# Patient Record
Sex: MALE | Race: WHITE | NOT HISPANIC OR LATINO | Employment: OTHER | URBAN - METROPOLITAN AREA
[De-identification: names, ages, dates, MRNs, and addresses within clinical notes are randomized per-mention and may not be internally consistent; named-entity substitution may affect disease eponyms.]

---

## 2017-05-08 ENCOUNTER — GENERIC CONVERSION - ENCOUNTER (OUTPATIENT)
Dept: OTHER | Facility: OTHER | Age: 67
End: 2017-05-08

## 2017-05-12 LAB
A/G RATIO (HISTORICAL): 1.7 (ref 1.2–2.2)
ALBUMIN SERPL BCP-MCNC: 4.3 G/DL (ref 3.6–4.8)
ALP SERPL-CCNC: 41 IU/L (ref 39–117)
ALT SERPL W P-5'-P-CCNC: 21 IU/L (ref 0–44)
AST SERPL W P-5'-P-CCNC: 23 IU/L (ref 0–40)
BILIRUB SERPL-MCNC: 0.7 MG/DL (ref 0–1.2)
BUN SERPL-MCNC: 20 MG/DL (ref 8–27)
BUN/CREA RATIO (HISTORICAL): 24 (ref 10–24)
CALCIUM SERPL-MCNC: 8.8 MG/DL (ref 8.6–10.2)
CHLORIDE SERPL-SCNC: 101 MMOL/L (ref 96–106)
CHOLEST SERPL-MCNC: 112 MG/DL (ref 100–199)
CHOLEST/HDLC SERPL: 2.9 RATIO UNITS (ref 0–5)
CO2 SERPL-SCNC: 21 MMOL/L (ref 18–29)
CREAT SERPL-MCNC: 0.82 MG/DL (ref 0.76–1.27)
EGFR AFRICAN AMERICAN (HISTORICAL): 108 ML/MIN/1.73
EGFR-AMERICAN CALC (HISTORICAL): 93 ML/MIN/1.73
GLUCOSE SERPL-MCNC: 94 MG/DL (ref 65–99)
HDLC SERPL-MCNC: 39 MG/DL
LDLC SERPL CALC-MCNC: 56 MG/DL (ref 0–99)
POTASSIUM SERPL-SCNC: 4.2 MMOL/L (ref 3.5–5.2)
SODIUM SERPL-SCNC: 139 MMOL/L (ref 134–144)
TOT. GLOBULIN, SERUM (HISTORICAL): 2.5 G/DL (ref 1.5–4.5)
TOTAL PROTEIN (HISTORICAL): 6.8 G/DL (ref 6–8.5)
TRIGL SERPL-MCNC: 85 MG/DL (ref 0–149)
VLDLC SERPL CALC-MCNC: 17 MG/DL (ref 5–40)

## 2017-05-19 ENCOUNTER — ALLSCRIPTS OFFICE VISIT (OUTPATIENT)
Dept: OTHER | Facility: OTHER | Age: 67
End: 2017-05-19

## 2017-10-13 ENCOUNTER — GENERIC CONVERSION - ENCOUNTER (OUTPATIENT)
Dept: OTHER | Facility: OTHER | Age: 67
End: 2017-10-13

## 2017-11-20 ENCOUNTER — ALLSCRIPTS OFFICE VISIT (OUTPATIENT)
Dept: OTHER | Facility: OTHER | Age: 67
End: 2017-11-20

## 2017-11-21 NOTE — PROGRESS NOTES
Assessment    1  Benign essential hypertension (401 1) (I10)   2  Hyperlipidemia (272 4) (E78 5)   3  Enlarged prostate without lower urinary tract symptoms (luts) (600 00) (N40 0)   4  Ischemic heart disease, chronic (414 9) (I25 9)   5  Class 1 obesity with serious comorbidity and body mass index (BMI) of 32 0 to 32 9 in adult, unspecified obesity type (278 00,V85 32) (D36 8,M55 70)    Plan  Benign essential hypertension    · A diet low in sodium and high in potassium, magnesium, and calcium can help yourblood pressure ; Status:Complete;   Done: 88NAC3115 08:57AM   · Begin or continue regular aerobic exercise  Gradually work up to at least 3 sessions of30 minutes of exercise a week ; Status:Complete;   Done: 03WSE0686 08:57AM   · Eat a low fat and low cholesterol diet ; Status:Complete;   Done: 08OZN6853 08:57AM   · Keep a diary of when and what you eat ; Status:Complete;   Done: 41VCC0736 08:57AM   · Restrict the salt in your diet by avoiding highly salted foods ; Status:Complete;   Done:20Nov2017 08:57AM   · Some eating tips that can help you lose weight ; Status:Complete;   Done: 32DMU467304:40UW   · We recommend that you change your eating habits slowly ; Status:Complete;   Done:20Nov2017 08:57AM   · We want to put you on the DASH diet for {count1} calories ; Status:Complete;   Done:20Nov2017 08:57AM  Enlarged prostate without lower urinary tract symptoms (luts)    · Tamsulosin HCl - 0 4 MG Oral Capsule; take 1 capsule by mouth at bedtime  Need for Tdap vaccination    · Adacel 5-2-15 5 LF-MCG/0 5 Intramuscular Suspension    Discussion/Summary    All stable, continue medsUrologist consult for nowto the office in 6 months  The treatment plan was reviewed with the patient/guardian  The patient/guardian understands and agrees with the treatment plan      Chief Complaint  Patient is here today for follow up of chronic conditions described in HPI        History of Present Illness  61years old male seen for follow-up BPH, hypertension, coronary artery disease, hyperlipidemia, all stable, regular follow-up with cardiologist, recent blood work showed LDL at goal, HDL 37, taking meds as prescribed, did not see urologist in the past, symptoms of BPH are controlled unless patient is drinking coffee right before going to bed      Review of Systems   Constitutional: No fever or chills, feels well, no tiredness, no recent weight gain or weight loss  ENT: no complaints of earache, no hearing loss, no nosebleeds, no nasal discharge, no sore throat, no hoarseness  Cardiovascular: No complaints of slow heart rate, no fast heart rate, no chest pain, no palpitations, no leg claudication, no lower extremity  Respiratory: No complaints of shortness of breath, no wheezing, no cough, no SOB on exertion, no orthopnea or PND  Gastrointestinal: No complaints of abdominal pain, no constipation, no nausea or vomiting, no diarrhea or bloody stools  Musculoskeletal: No complaints of arthralgia, no myalgias, no joint swelling or stiffness, no limb pain or swelling  Integumentary: No complaints of skin rash or skin lesions, no itching, no skin wound, no dry skin  Neurological: No compliants of headache, no confusion, no convulsions, no numbness or tingling, no dizziness or fainting, no limb weakness, no difficulty walking  Psychiatric: Is not suicidal, no sleep disturbances, no anxiety or depression, no change in personality, no emotional problems  Endocrine: No complaints of proptosis, no hot flashes, no muscle weakness, no erectile dysfunction, no deepening of the voice, no feelings of weakness  Active Problems  1  Abnormal blood chemistry (790 6) (R79 9)   2  Actinic keratosis (702 0) (L57 0)   3  Benign essential hypertension (401 1) (I10)   4  CAD in native artery (414 01) (I25 10)   5  Carpal tunnel syndrome, unspecified laterality (354 0) (G56 00)   6   Encounter for screening for malignant neoplasm of colon (V76 51) (Z12 11)   7  Encounter for screening for malignant neoplasm of colon (V76 51) (Z12 11)   8  Enlarged prostate without lower urinary tract symptoms (luts) (600 00) (N40 0)   9  Flu vaccine need (V04 81) (Z23)   10  Hyperlipidemia (272 4) (E78 5)   11  Ischemic heart disease, chronic (414 9) (I25 9)   12  Need for influenza vaccination (V04 81) (Z23)   13  Pre-procedural examination (V72 84) (Z01 818)   14  Status post angioplasty (V45 89) (Z98 62)   15  Well adult on routine health check (V70 0) (Z00 00)    Surgical History  1  History of Appendectomy   2  History of PTCA    The surgical history was reviewed and updated today  Family History  Mother    1  No pertinent family history  Father    2  Family history of    3  Family history of Lung abnormality    The family history was reviewed and updated today  Social History     · Denied: History of Being A Social Drinker   · Daily Coffee Consumption (3  Cups/Day)   · Marital History - Currently    · Never A Smoker   · Retired From Work   · Sleep Pattern Time In Bed (8  Hours)  The social history was reviewed and updated today  Current Meds   1  Aspirin Low Dose 81 MG TABS; TAKE 1 TABLET DAILY; Therapy: (Recorded:63Wrm4198) to Recorded   2  Atorvastatin Calcium 40 MG Oral Tablet; take 1 tablet daily at bedtime; Therapy: 74MWQ8765 to (Evaluate:2018)  Requested for: 2017; Last Rx:92Epz5724 Ordered   3  Biotin 5000 MCG Oral Capsule; TAKE 1 CAPSULE DAILY; Therapy: (Recorded:21Duy7618) to Recorded   4  Centrum Silver TABS; TAKE 1 TABLET DAILY; Therapy: (Recorded:24Gnk8177) to Recorded   5  Co Q-10 400 MG Oral Capsule; TAKE 1 CAPSULE DAILY WITH A MEAL; Therapy: 24FUF4904 to Recorded   6  Ezetimibe 10 MG Oral Tablet; take 1 tablet by mouth every morning; Therapy: 82DMT0147 to (Evaluate:49Llk9801)  Requested for: 80Fei7609; Last Rx:90Fcp6452 Ordered   7  Krill Oil CAPS; take 1 capsule daily;  Therapy: (Recorded:80Kmz7189) to Recorded 8  Metoprolol Succinate ER 25 MG Oral Tablet Extended Release 24 Hour; TAKE 1 TABLET ONCE DAILY; Therapy: 96FAZ5112 to (Eual Slate)  Requested for: 25NPN8616; Last Rx:72Nvv0057 Ordered   9  Osteo-Bi-Flex TABS; 2 qd; Therapy: 61QRN2218 to  Requested for: 83IIX1759 Recorded   10  Restasis 0 05 % Ophthalmic Emulsion; Therapy: 22RNI4403 to Recorded   11  Tamsulosin HCl - 0 4 MG Oral Capsule; take 1 capsule by mouth at bedtime; Therapy: 35Mxo4858 to (Evaluate:81Wso8183)  Requested for: 01FZR5428; Last  Rx:91Lks8366; Status: ACTIVE - Renewal Denied Ordered   12  Valsartan 160 MG Oral Tablet; take 1 tablet by mouth once daily; Therapy: 94NEZ8859 to (Austyn Grossman)  Requested for: 29FEY2436; Last  Rx:27Isi6749 Ordered   13  Vitamin D3 2000 UNIT Oral Tablet; Take 1 tablet daily; Therapy: (Recorded:37Yot6092) to Recorded    The medication list was reviewed and updated today  Allergies  1  No Known Drug Allergies    Vitals  Vital Signs    Recorded: 20Nov2017 08:19AM   Temperature 97 8 F   Heart Rate 84   Respiration Quality Normal   Respiration 16   Systolic 954   Diastolic 70   BP CUFF SIZE Large   Height 5 ft 7 5 in   Weight 209 lb    BMI Calculated 32 25   BSA Calculated 2 07       Physical Exam   Constitutional  General appearance: No acute distress, well appearing and well nourished  obese  Pulmonary  Respiratory effort: No increased work of breathing or signs of respiratory distress  Auscultation of lungs: Clear to auscultation, equal breath sounds bilaterally, no wheezes, no rales, no rhonci  Cardiovascular  Auscultation of heart: Normal rate and rhythm, normal S1 and S2, without murmurs  Examination of extremities for edema and/or varicosities: Normal    Musculoskeletal  Gait and station: Normal    Neurologic  Cranial nerves: Cranial nerves 2-12 intact     Psychiatric  Mood and affect: Normal          Results/Data  PHQ-2 Adult Depression Screening 20Nov2017 08:24AM User, Ahs     Test Name Result Flag Reference   PHQ-2 Adult Depression Score 0       Over the last two weeks, how often have you been bothered by any of the following problems? Little interest or pleasure in doing things: Not at all - 0 Feeling down, depressed, or hopeless: Not at all - 0   PHQ-2 Adult Depression Screening Negative       PHQ-2 Adult Depression Screening 20Nov2017 08:24AM Cody Priest     Test Name Result Flag Reference   PHQ-2 Adult Depression Score 0       Over the last two weeks, how often have you been bothered by any of the following problems? Little interest or pleasure in doing things: Not at all - 0 Feeling down, depressed, or hopeless: Not at all - 0   PHQ-2 Adult Depression Screening Negative       (1) COMPREHENSIVE METABOLIC PANEL 68JCA0123 60:42EX Kathy Caal     Test Name Result Flag Reference   Glucose, Serum 94 mg/dL  65-99   BUN 20 mg/dL  8-27   Creatinine, Serum 0 82 mg/dL  0 76-1 27   BUN/Creatinine Ratio 24  10-24   Sodium, Serum 139 mmol/L  134-144   Potassium, Serum 4 2 mmol/L  3 5-5 2   Chloride, Serum 101 mmol/L     Carbon Dioxide, Total 21 mmol/L  18-29   Calcium, Serum 8 8 mg/dL  8 6-10 2   Protein, Total, Serum 6 8 g/dL  6 0-8 5   Albumin, Serum 4 3 g/dL  3 6-4 8   Globulin, Total 2 5 g/dL  1 5-4 5   A/G Ratio 1 7  1 2-2 2   Bilirubin, Total 0 7 mg/dL  0 0-1 2   Alkaline Phosphatase, S 41 IU/L     AST (SGOT) 23 IU/L  0-40   ALT (SGPT) 21 IU/L  0-44   eGFR If NonAfricn Am 93 mL/min/1 73  >59   eGFR If Africn Am 108 mL/min/1 73  >59     (1) LIPID PANEL, FASTING 03HYF8097 08:21AM Kathy Caal     Test Name Result Flag Reference   Cholesterol, Total 112 mg/dL  100-199   Triglycerides 85 mg/dL  0-149   HDL Cholesterol 39 mg/dL L >39   VLDL Cholesterol Richardson 17 mg/dL  5-40   LDL Cholesterol Calc 56 mg/dL  0-99   T  Chol/HDL Ratio 2 9 ratio units  0 0-5 0     T   Chol/HDL Ratio                                                            Men  Women                                              1/2 Avg Risk  3 4    3 3                                                  Avg Risk  5 0    4 4                                               2X Avg  Risk  9 6    7 1                                               3X Avg  Risk 23 4   11 0       Signatures   Electronically signed by : JAMIE Bonilla ; Nov 20 2017  9:00AM EST                       (Author)

## 2018-01-12 VITALS
HEIGHT: 68 IN | RESPIRATION RATE: 16 BRPM | BODY MASS INDEX: 31.67 KG/M2 | TEMPERATURE: 97.8 F | DIASTOLIC BLOOD PRESSURE: 70 MMHG | SYSTOLIC BLOOD PRESSURE: 120 MMHG | WEIGHT: 209 LBS | HEART RATE: 84 BPM

## 2018-01-12 VITALS
OXYGEN SATURATION: 98 % | BODY MASS INDEX: 33.59 KG/M2 | SYSTOLIC BLOOD PRESSURE: 120 MMHG | DIASTOLIC BLOOD PRESSURE: 76 MMHG | HEART RATE: 61 BPM | HEIGHT: 66 IN | WEIGHT: 209 LBS

## 2018-01-22 VITALS
DIASTOLIC BLOOD PRESSURE: 76 MMHG | WEIGHT: 209.4 LBS | SYSTOLIC BLOOD PRESSURE: 130 MMHG | BODY MASS INDEX: 31.74 KG/M2 | OXYGEN SATURATION: 96 % | HEART RATE: 76 BPM | HEIGHT: 68 IN

## 2018-03-16 ENCOUNTER — OFFICE VISIT (OUTPATIENT)
Dept: FAMILY MEDICINE CLINIC | Facility: CLINIC | Age: 68
End: 2018-03-16
Payer: MEDICARE

## 2018-03-16 VITALS
DIASTOLIC BLOOD PRESSURE: 80 MMHG | WEIGHT: 214 LBS | HEART RATE: 60 BPM | HEIGHT: 66 IN | RESPIRATION RATE: 12 BRPM | BODY MASS INDEX: 34.39 KG/M2 | TEMPERATURE: 98.3 F | SYSTOLIC BLOOD PRESSURE: 134 MMHG

## 2018-03-16 DIAGNOSIS — R20.2 PARESTHESIA OF LEFT THUMB: ICD-10-CM

## 2018-03-16 DIAGNOSIS — M25.532 LEFT WRIST PAIN: Primary | ICD-10-CM

## 2018-03-16 DIAGNOSIS — M79.641 RIGHT HAND PAIN: ICD-10-CM

## 2018-03-16 PROCEDURE — 99213 OFFICE O/P EST LOW 20 MIN: CPT | Performed by: NURSE PRACTITIONER

## 2018-03-16 RX ORDER — TAMSULOSIN HYDROCHLORIDE 0.4 MG/1
CAPSULE ORAL
COMMUNITY
Start: 2018-02-20 | End: 2018-09-15 | Stop reason: SDUPTHER

## 2018-03-16 RX ORDER — ATORVASTATIN CALCIUM 40 MG/1
TABLET, FILM COATED ORAL
COMMUNITY
Start: 2018-01-09 | End: 2018-08-30 | Stop reason: SDUPTHER

## 2018-03-16 RX ORDER — METOPROLOL SUCCINATE 25 MG/1
25 TABLET, EXTENDED RELEASE ORAL DAILY
COMMUNITY
Start: 2018-02-20 | End: 2018-09-15 | Stop reason: SDUPTHER

## 2018-03-16 RX ORDER — VALSARTAN 160 MG/1
1 TABLET ORAL DAILY
COMMUNITY
Start: 2011-10-13 | End: 2018-07-23 | Stop reason: ALTCHOICE

## 2018-03-16 RX ORDER — BIOTIN 5 MG
1 TABLET ORAL DAILY
COMMUNITY
End: 2019-09-13

## 2018-03-16 RX ORDER — EZETIMIBE 10 MG/1
1 TABLET ORAL
COMMUNITY
Start: 2015-03-06 | End: 2018-06-23 | Stop reason: SDUPTHER

## 2018-03-16 NOTE — PROGRESS NOTES
Assessment/Plan:   1  Left wrist pain    - Ambulatory referral to Orthopedic Surgery; Future    2  Paresthesia of left thumb    - Ambulatory referral to Orthopedic Surgery; Future    3  Right hand pain    - Ambulatory referral to Orthopedic Surgery; Future    Suspect carpal tunnel  Ref to ortho  Will need emg/ncs     Subjective:  I have reviewed past medical history, surgical history, medications, allergies, family history, and social history  Patient ID: Karen Crawford is a 74 y o  male  Left wrist pain with occ shooting pain at times  Left thumb numb at times  Occ numbness to left index finger  Right hand occ with "electrical" shooting pains  Musician and plays horn  Had EMG/NCS probably 10 years ago, left worse than right  Last few days, getting worse and now left thumb constantly numb  Review of Systems   Musculoskeletal: Negative for joint swelling  Pain left wrist     Numbness left thumb  Intermittent shooting pains right hand  Skin: Negative for color change, pallor and rash  Neurological: Positive for numbness  Objective:     Physical Exam   Constitutional: He is oriented to person, place, and time  He appears well-developed and well-nourished  No distress  Cardiovascular: Normal rate and regular rhythm  Normal palpable radial pulses b/l  Brisk cap refill b/l hands   Pulmonary/Chest: Effort normal and breath sounds normal    Musculoskeletal: Normal range of motion  He exhibits no edema  Neurological: He is alert and oriented to person, place, and time  Skin: Skin is warm and dry  No rash noted  Psychiatric: He has a normal mood and affect

## 2018-03-30 VITALS
HEIGHT: 65 IN | BODY MASS INDEX: 35.89 KG/M2 | SYSTOLIC BLOOD PRESSURE: 133 MMHG | DIASTOLIC BLOOD PRESSURE: 80 MMHG | HEART RATE: 80 BPM | WEIGHT: 215.4 LBS

## 2018-03-30 DIAGNOSIS — M65.341 TRIGGER FINGER, RIGHT RING FINGER: ICD-10-CM

## 2018-03-30 DIAGNOSIS — G56.02 CARPAL TUNNEL SYNDROME ON LEFT: Primary | ICD-10-CM

## 2018-03-30 PROCEDURE — 99204 OFFICE O/P NEW MOD 45 MIN: CPT | Performed by: ORTHOPAEDIC SURGERY

## 2018-03-30 RX ORDER — QUINIDINE SULFATE 200 MG
1 TABLET ORAL DAILY
COMMUNITY
Start: 2017-10-13

## 2018-03-30 RX ORDER — ACETAMINOPHEN 160 MG
2000 TABLET,DISINTEGRATING ORAL DAILY
COMMUNITY

## 2018-03-30 RX ORDER — CYCLOSPORINE 0.5 MG/ML
EMULSION OPHTHALMIC EVERY 12 HOURS
COMMUNITY
Start: 2013-01-11

## 2018-03-30 NOTE — PROGRESS NOTES
Assessment/Plan:  Assessment/Plan   1  Carpal tunnel syndrome on left     2  Trigger finger, right ring finger       Scribe Attestation    I,:   Debra Rios am acting as a scribe while in the presence of the attending physician :        I,:   Az Ferreira MD personally performed the services described in this documentation    as scribed in my presence :            Plan  Upon physical examination, I feel that Nirmal Fontana is a candidate for left wrist carpal tunnel release  Nirmal Fontana does report a prior EMG study 5-6 years ago which confirmed the diagnosis of left carpal tunnel syndrome  I did discuss with Nirmal Fontana the risks and benefits of the procedure, including but not limited to infection, bleeding, nerve injury, pain, and the procedure not working as well as intended, and  He has elected proceed with this procedure and we'll assist him in scheduling in the office today  I will follow-up with him after the procedure per protocol  Additionally, I feel that Nirmal Fontana has symptoms related to early stages of right ring trigger finger  However, considering this has only been symptomatic for one week, I am deferring an injection or further treatment at this time  I will follow-up with Nirmal Fontana for the trigger finger on an as-needed basis as symptoms dictate  Nirmal Fontana understands and is in agreement with this treatment plan  Subjective:   Patient ID: Editha Cogan is a 74 y o  male  Nirmal Fontana is a 22-year-old right-hand-dominant male here today for initial evaluation of his left wrist and right finger  She is referred today by his brother Rimma Garibay, who is a prior patient  Today, he reports a long-term left wrist carpal tunnel syndrome symptoms for the past 5-6 years  He reports he had a prior EMG study which confirmed this diagnosis  Today, he reports continued moderate, intermittent pain and numbness when he plays the [place] or at night when sleeping and wakes him up    He denies any radiating pain for prior left wrist injury/trauma  He is hopeful to pursue left wrist carpal tunnel release as he feels this will help him return without symptoms to playing the [place]  Additionally, he reports right ring finger pain that has been ongoing for the past week or so with activities requiring him to repeatedly flex his right ring finger  He reports this pain as moderate and intermittent in nature, but denies any numbness, tingling, or radiating pain  The following portions of the patient's history were reviewed and updated as appropriate:   He  has a past medical history of Coronary artery disease; Hyperlipidemia; and Hypertension  He There are no active problems to display for this patient  He  has a past surgical history that includes Coronary angioplasty with stent  His family history is not on file  He  reports that he has never smoked  He has never used smokeless tobacco  He reports that he does not drink alcohol or use drugs  Current Outpatient Prescriptions   Medication Sig Dispense Refill    aspirin (ASPIRIN LOW DOSE) 81 MG tablet Take 1 tablet by mouth daily      atorvastatin (LIPITOR) 40 mg tablet       Cholecalciferol (VITAMIN D3) 2000 units capsule Take 1 tablet by mouth daily      Coenzyme Q10 (CO Q-10) 400 MG CAPS Take 1 capsule by mouth Daily      cycloSPORINE (RESTASIS) 0 05 % ophthalmic emulsion Apply to eye      ezetimibe (ZETIA) 10 mg tablet Take 1 tablet by mouth      Krill Oil 1000 MG CAPS Take 1 capsule by mouth daily      metoprolol succinate (TOPROL-XL) 25 mg 24 hr tablet       Misc Natural Products (OSTEO BI-FLEX/5-LOXIN ADVANCED) TABS Take by mouth daily      Multiple Vitamins-Minerals (BIO-35 GLUTEN-FREE PO) Take 1 capsule by mouth daily      tamsulosin (FLOMAX) 0 4 mg       valsartan (DIOVAN) 160 mg tablet Take 1 tablet by mouth daily       No current facility-administered medications for this visit        Current Outpatient Prescriptions on File Prior to Visit   Medication Sig    aspirin (ASPIRIN LOW DOSE) 81 MG tablet Take 1 tablet by mouth daily    atorvastatin (LIPITOR) 40 mg tablet     ezetimibe (ZETIA) 10 mg tablet Take 1 tablet by mouth    Krill Oil 1000 MG CAPS Take 1 capsule by mouth daily    metoprolol succinate (TOPROL-XL) 25 mg 24 hr tablet     Multiple Vitamins-Minerals (BIO-35 GLUTEN-FREE PO) Take 1 capsule by mouth daily    tamsulosin (FLOMAX) 0 4 mg     valsartan (DIOVAN) 160 mg tablet Take 1 tablet by mouth daily     No current facility-administered medications on file prior to visit  He has No Known Allergies       Review of Systems   Constitutional: Negative for chills, fever and unexpected weight change  HENT: Negative for hearing loss, nosebleeds and sore throat  Eyes: Negative for pain, redness and visual disturbance  Respiratory: Negative for cough, shortness of breath and wheezing  Cardiovascular: Negative for chest pain, palpitations and leg swelling  Gastrointestinal: Negative for abdominal pain, nausea and vomiting  Endocrine: Negative for polydipsia and polyuria  Genitourinary: Negative for dysuria and hematuria  Musculoskeletal: Positive for myalgias  Negative for arthralgias and joint swelling  Skin: Negative for rash and wound  Neurological: Positive for numbness  Negative for dizziness and headaches  Psychiatric/Behavioral: Negative for decreased concentration and suicidal ideas  The patient is not nervous/anxious  (-) Depression       Objective:  Right Hand Exam     Tenderness   The patient is experiencing tenderness in the lopez area  Range of Motion   The patient has normal right wrist ROM  Hand   MP Ring: normal   PIP Ring: normal   DIP Ring: normal     Muscle Strength   The patient has normal right wrist strength    Wrist Extension: 5/5   Wrist Flexion: 5/5   : 5/5     Tests   Tinels Sign (Medial Nerve): negative    Other   Sensation: normal  Pulse: present      Left Hand Exam     Tenderness   The patient is experiencing tenderness in the lopez area  Range of Motion   The patient has normal left wrist ROM  Muscle Strength   Left wrist normal muscle strength: Abductor Pollicis Brevis- 5/5  Wrist Extension: 5/5   Wrist Flexion: 5/5   :  4/5     Tests   Phalens Sign: positive  Tinels Sign (Medial Nerve): positive  Finkelstein: positive    Other   Sensation: normal  Pulse: present    Comments:  Marbin's- (+)          Strength/Myotome Testing     Left Wrist/Hand   Wrist extension: 5  Wrist flexion: 5    Right Wrist/Hand   Normal wrist strength  Wrist extension: 5  Wrist flexion: 5    Tests     Left Wrist/Hand   Positive Finkelstein's, Phalen's sign and Tinel's sign (medial nerve)  Right Wrist/Hand   Negative Tinel's sign (medial nerve)  Physical Exam   Constitutional: He is oriented to person, place, and time  He appears well-developed and well-nourished  HENT:   Head: Normocephalic  Nose: Nose normal    Eyes: Conjunctivae and EOM are normal    Neck: Normal range of motion  Cardiovascular: Normal rate, regular rhythm, normal heart sounds and intact distal pulses  Pulmonary/Chest: Effort normal and breath sounds normal  No respiratory distress  Abdominal: Soft  He exhibits no distension  Musculoskeletal: Normal range of motion  Left wrist: He exhibits tenderness  He exhibits normal range of motion, no bony tenderness, no swelling and no crepitus  Right hand: He exhibits tenderness  He exhibits normal range of motion, normal capillary refill and no swelling  Normal sensation noted  Normal strength noted  Hands:  Neurological: He is alert and oriented to person, place, and time  Skin: Skin is warm and dry  Psychiatric: He has a normal mood and affect   His behavior is normal  Judgment and thought content normal

## 2018-06-22 ENCOUNTER — OFFICE VISIT (OUTPATIENT)
Dept: FAMILY MEDICINE CLINIC | Facility: CLINIC | Age: 68
End: 2018-06-22
Payer: MEDICARE

## 2018-06-22 VITALS
DIASTOLIC BLOOD PRESSURE: 80 MMHG | TEMPERATURE: 97.8 F | SYSTOLIC BLOOD PRESSURE: 140 MMHG | HEART RATE: 72 BPM | WEIGHT: 214 LBS | BODY MASS INDEX: 35.65 KG/M2 | HEIGHT: 65 IN

## 2018-06-22 DIAGNOSIS — J06.9 UPPER RESPIRATORY TRACT INFECTION, UNSPECIFIED TYPE: Primary | ICD-10-CM

## 2018-06-22 PROBLEM — E66.9 CLASS 1 OBESITY WITH SERIOUS COMORBIDITY AND BODY MASS INDEX (BMI) OF 32.0 TO 32.9 IN ADULT: Status: ACTIVE | Noted: 2017-11-20

## 2018-06-22 PROBLEM — E66.811 CLASS 1 OBESITY WITH SERIOUS COMORBIDITY AND BODY MASS INDEX (BMI) OF 32.0 TO 32.9 IN ADULT: Status: ACTIVE | Noted: 2017-11-20

## 2018-06-22 PROCEDURE — 99213 OFFICE O/P EST LOW 20 MIN: CPT | Performed by: FAMILY MEDICINE

## 2018-06-22 RX ORDER — AZITHROMYCIN 250 MG/1
TABLET, FILM COATED ORAL
Qty: 6 TABLET | Refills: 0 | Status: SHIPPED | OUTPATIENT
Start: 2018-06-22 | End: 2018-06-26

## 2018-06-22 RX ORDER — BENZONATATE 200 MG/1
200 CAPSULE ORAL 3 TIMES DAILY PRN
Qty: 20 CAPSULE | Refills: 0 | Status: SHIPPED | OUTPATIENT
Start: 2018-06-22 | End: 2019-06-05

## 2018-06-22 NOTE — PROGRESS NOTES
Ronna Bradford is a 74 y o  male who presents for evaluation of symptoms of a URI, possible sinusitis  Symptoms include achiness, congestion, coryza, non productive cough, post nasal drip and sinus pressure  Onset of symptoms was 1 week ago and has been gradually worsening since that time  Treatment to date: cough suppressants and decongestants  The following portions of the patient's history were reviewed and updated as appropriate: allergies, current medications, past family history, past medical history, past social history, past surgical history and problem list     Review of Systems  Constitutional: negative  Respiratory: positive for cough, negative for asthma, chronic bronchitis and wheezing  Gastrointestinal: negative  Objective     General appearance: alert and oriented, in no acute distress  Ears: normal TM's and external ear canals both ears  Nose: sinus tenderness bilateral  Lungs: clear to auscultation bilaterally  Assessment/Plan     URI/possible early sinusitis   Discussed diagnosis and treatment of URI  Suggested symptomatic OTC remedies  Nasal saline spray for congestion  Zithromax per orders  Follow up as needed  Dorathy Infield

## 2018-06-23 DIAGNOSIS — E78.5 DYSLIPIDEMIA: Primary | ICD-10-CM

## 2018-06-25 RX ORDER — EZETIMIBE 10 MG/1
TABLET ORAL
Qty: 90 TABLET | Refills: 2 | Status: SHIPPED | OUTPATIENT
Start: 2018-06-25 | End: 2019-03-31 | Stop reason: SDUPTHER

## 2018-06-26 PROBLEM — G56.02 CARPAL TUNNEL SYNDROME ON LEFT: Status: ACTIVE | Noted: 2018-06-26

## 2018-07-02 ENCOUNTER — TELEPHONE (OUTPATIENT)
Dept: OBGYN CLINIC | Facility: CLINIC | Age: 68
End: 2018-07-02

## 2018-07-02 DIAGNOSIS — E78.5 HYPERLIPIDEMIA, UNSPECIFIED HYPERLIPIDEMIA TYPE: Primary | ICD-10-CM

## 2018-07-02 NOTE — TELEPHONE ENCOUNTER
Wife calling concerning Laura Guerra  Has surgery schedule with Dr Toro Diaz on 08/13/18  Has some questions concerning bloodwork script he is picking up  Wanted to make sure cholesterol is included  States he needs clearance from Dr Yaquelin Zaragoza and he will want cholesterol checked  Please call       Phone# [phone]

## 2018-07-03 ENCOUNTER — TELEPHONE (OUTPATIENT)
Dept: OBGYN CLINIC | Facility: CLINIC | Age: 68
End: 2018-07-03

## 2018-07-17 NOTE — TELEPHONE ENCOUNTER
I spoke with   Chicho Enrique  She said that Agueda Sethi stopped in and picked up all lab orders and had them drawn at [facility] this week

## 2018-07-18 LAB
AMBIG ABBREV DEFAULT: NORMAL
AMBIG ABBREV DEFAULT: NORMAL
APTT PPP: 25 SEC (ref 24–33)
BASOPHILS # BLD AUTO: 0 X10E3/UL (ref 0–0.2)
BASOPHILS NFR BLD AUTO: 0 %
BUN SERPL-MCNC: 15 MG/DL (ref 8–27)
BUN/CREAT SERPL: 17 (ref 10–24)
CALCIUM SERPL-MCNC: 9.2 MG/DL (ref 8.6–10.2)
CHLORIDE SERPL-SCNC: 101 MMOL/L (ref 96–106)
CHOLEST SERPL-MCNC: 128 MG/DL (ref 100–199)
CO2 SERPL-SCNC: 24 MMOL/L (ref 20–29)
CREAT SERPL-MCNC: 0.88 MG/DL (ref 0.76–1.27)
EOSINOPHIL # BLD AUTO: 0.1 X10E3/UL (ref 0–0.4)
EOSINOPHIL NFR BLD AUTO: 2 %
ERYTHROCYTE [DISTWIDTH] IN BLOOD BY AUTOMATED COUNT: 13.4 % (ref 12.3–15.4)
GLUCOSE SERPL-MCNC: 97 MG/DL (ref 65–99)
HCT VFR BLD AUTO: 43.5 % (ref 37.5–51)
HDLC SERPL-MCNC: 41 MG/DL
HGB BLD-MCNC: 14.6 G/DL (ref 13–17.7)
IMM GRANULOCYTES # BLD: 0 X10E3/UL (ref 0–0.1)
IMM GRANULOCYTES NFR BLD: 0 %
INR PPP: 1 (ref 0.8–1.2)
LDLC SERPL CALC-MCNC: 57 MG/DL (ref 0–99)
LYMPHOCYTES # BLD AUTO: 2.8 X10E3/UL (ref 0.7–3.1)
LYMPHOCYTES NFR BLD AUTO: 39 %
MCH RBC QN AUTO: 33 PG (ref 26.6–33)
MCHC RBC AUTO-ENTMCNC: 33.6 G/DL (ref 31.5–35.7)
MCV RBC AUTO: 98 FL (ref 79–97)
MONOCYTES # BLD AUTO: 0.5 X10E3/UL (ref 0.1–0.9)
MONOCYTES NFR BLD AUTO: 7 %
NEUTROPHILS # BLD AUTO: 3.7 X10E3/UL (ref 1.4–7)
NEUTROPHILS NFR BLD AUTO: 52 %
PLATELET # BLD AUTO: 186 X10E3/UL (ref 150–379)
POTASSIUM SERPL-SCNC: 4.4 MMOL/L (ref 3.5–5.2)
PROTHROMBIN TIME: 10.4 SEC (ref 9.1–12)
RBC # BLD AUTO: 4.42 X10E6/UL (ref 4.14–5.8)
SL AMB EGFR AFRICAN AMERICAN: 104 ML/MIN/1.73
SL AMB EGFR NON AFRICAN AMERICAN: 90 ML/MIN/1.73
SL AMB VLDL CHOLESTEROL CALC: 30 MG/DL (ref 5–40)
SODIUM SERPL-SCNC: 139 MMOL/L (ref 134–144)
TRIGL SERPL-MCNC: 150 MG/DL (ref 0–149)
WBC # BLD AUTO: 7.2 X10E3/UL (ref 3.4–10.8)

## 2018-07-23 ENCOUNTER — OFFICE VISIT (OUTPATIENT)
Dept: CARDIOLOGY CLINIC | Facility: CLINIC | Age: 68
End: 2018-07-23
Payer: MEDICARE

## 2018-07-23 VITALS
BODY MASS INDEX: 35.71 KG/M2 | SYSTOLIC BLOOD PRESSURE: 100 MMHG | WEIGHT: 214.6 LBS | DIASTOLIC BLOOD PRESSURE: 60 MMHG | OXYGEN SATURATION: 91 % | HEART RATE: 76 BPM

## 2018-07-23 DIAGNOSIS — G56.02 CARPAL TUNNEL SYNDROME ON LEFT: ICD-10-CM

## 2018-07-23 DIAGNOSIS — I10 ESSENTIAL HYPERTENSION: ICD-10-CM

## 2018-07-23 DIAGNOSIS — I25.10 CAD IN NATIVE ARTERY: ICD-10-CM

## 2018-07-23 DIAGNOSIS — E66.9 OBESITY (BMI 35.0-39.9 WITHOUT COMORBIDITY): ICD-10-CM

## 2018-07-23 DIAGNOSIS — I25.9 ISCHEMIC HEART DISEASE, CHRONIC: ICD-10-CM

## 2018-07-23 DIAGNOSIS — Z01.818 PREOP EXAMINATION: Primary | ICD-10-CM

## 2018-07-23 DIAGNOSIS — E78.2 MIXED HYPERLIPIDEMIA: ICD-10-CM

## 2018-07-23 PROCEDURE — 93000 ELECTROCARDIOGRAM COMPLETE: CPT | Performed by: INTERNAL MEDICINE

## 2018-07-23 PROCEDURE — 99205 OFFICE O/P NEW HI 60 MIN: CPT | Performed by: INTERNAL MEDICINE

## 2018-07-23 RX ORDER — OLMESARTAN MEDOXOMIL 20 MG/1
20 TABLET ORAL DAILY
Qty: 90 TABLET | Refills: 2 | Status: SHIPPED | OUTPATIENT
Start: 2018-07-23 | End: 2019-01-05 | Stop reason: SDUPTHER

## 2018-07-23 RX ORDER — GLUCOSAMINE/D3/BOSWELLIA SERRA 1500MG-400
5000 TABLET ORAL DAILY
COMMUNITY

## 2018-07-23 NOTE — LETTER
July 24, 2018     Maxine Rea MD  [facility] [address]    Patient: Edin Dominguez   YOB: 1950   Date of Visit: 7/23/2018       Dear Dr Arabella Hugo: Thank you for referring Edin Dominguez to me for evaluation  Below are my notes for this consultation  If you have questions, please do not hesitate to call me  I look forward to following your patient along with you  Sincerely,        Sujatha Smith DO        CC: No Recipients  Kathy Caal DO  7/24/2018 10:33 PM  Sign at close encounter   Subjective:      Edin Dominguez is a 74 y o  male who presents to the office today for a preoperative consultation at the request of surgeon Dr Arabella Hugo who plans on performing Carpal tunnel release on August 13  This consultation is requested for the specific conditions prompting preoperative evaluation (i e  because of potential affect on operative risk): CAD  Planned anesthesia is general  The patient has no known anesthesia issues  He denies any shortness of breath or chest pain while going up 2 flights of stairs  He has a history of RCA stent in 2014  Prior to his PCI he had a burning sensation in his chest  He previously was on atorvastatin 80 mg daily which was reduced to 40 mg daily due to myalgias and elevated creatinine kinase  Reports good endurance to medications and remains on Zetia  The following portions of the patient's history were reviewed and updated as appropriate:   He  has a past medical history of Abnormal electrocardiogram; Chest pain; Coronary artery disease; Enthesopathy of elbow (02/15/2006); Epididymo-orchitis (07/10/2012); Hyperlipidemia; Hypertension; and Voice disturbance (05/27/2008)  He  has a past surgical history that includes Coronary angioplasty with stent; Appendectomy; and Coronary angioplasty  His family history is not on file  He  reports that he has never smoked   He has never used smokeless tobacco  He reports that he does not drink alcohol or use drugs  Current Outpatient Prescriptions   Medication Sig Dispense Refill    aspirin (ASPIRIN LOW DOSE) 81 MG tablet Take 1 tablet by mouth daily      atorvastatin (LIPITOR) 40 mg tablet       Biotin 28034 MCG TABS Take 5,000 mg by mouth daily      Cholecalciferol (VITAMIN D3) 2000 units capsule Take 1 tablet by mouth daily      Coenzyme Q10 (CO Q-10) 400 MG CAPS Take 1 capsule by mouth Daily      cycloSPORINE (RESTASIS) 0 05 % ophthalmic emulsion Apply to eye      ezetimibe (ZETIA) 10 mg tablet TAKE 1 TABLET EVERY MORNING 90 tablet 2    Krill Oil 1000 MG CAPS Take 1 capsule by mouth daily      metoprolol succinate (TOPROL-XL) 25 mg 24 hr tablet       Misc Natural Products (OSTEO BI-FLEX/5-LOXIN ADVANCED) TABS Take by mouth daily      Multiple Vitamins-Minerals (BIO-35 GLUTEN-FREE PO) Take 1 capsule by mouth daily      tamsulosin (FLOMAX) 0 4 mg       benzonatate (TESSALON) 200 MG capsule Take 1 capsule (200 mg total) by mouth 3 (three) times a day as needed for cough 20 capsule 0    olmesartan (BENICAR) 20 mg tablet Take 1 tablet (20 mg total) by mouth daily 90 tablet 2     No current facility-administered medications for this visit  He has No Known Allergies       Review of Systems    Review of Systems   Constitutional: Negative for chills, fatigue and fever  HENT: Negative for congestion, nosebleeds and postnasal drip  Respiratory: Negative for cough, chest tightness and shortness of breath  Cardiovascular: Negative for chest pain, palpitations and leg swelling  Gastrointestinal: Negative for abdominal distention, abdominal pain, diarrhea, nausea and vomiting  Endocrine: Negative for polydipsia, polyphagia and polyuria  Musculoskeletal: Negative for gait problem and myalgias  Skin: Negative for color change, pallor and rash     Allergic/Immunologic: Negative for environmental allergies, food allergies and immunocompromised state  Neurological: Positive for numbness  Negative for dizziness, seizures, syncope and light-headedness  Hematological: Negative for adenopathy  Does not bruise/bleed easily  Psychiatric/Behavioral: Negative for dysphoric mood  The patient is not nervous/anxious  Objective:      Physical Exam  /60 (BP Location: Right arm, Patient Position: Sitting, Cuff Size: Large)   Pulse 76   Wt 97 3 kg (214 lb 9 6 oz)   SpO2 91%   BMI 35 71 kg/m²    Physical Exam   Constitutional: He appears healthy  No distress  HENT:   Nose: Nose normal    Mouth/Throat: Oropharynx is clear  Neck: Neck supple  No JVD present  Cardiovascular: Normal rate and regular rhythm  Exam reveals no distant heart sounds and no friction rub  No murmur heard  Pulmonary/Chest: Effort normal and breath sounds normal  He has no wheezes  He has no rales  He exhibits no tenderness  Abdominal: Soft  He exhibits no distension  There is no tenderness  Musculoskeletal: He exhibits no edema  Neurological: He is alert and oriented to person, place, and time  Skin: Skin is warm and dry  No rash noted  Cardiographics  ECG: NSR with LVH   Echocardiogram: normal and reviewed by myself    Lab Review   Lab Results   Component Value Date     05/12/2017    K 4 2 05/12/2017     05/12/2017    CO2 21 05/12/2017    BUN 15 07/13/2018    CREATININE 0 88 07/13/2018    CREATININE 0 82 05/12/2017    GLUCOSE 94 05/12/2017    CALCIUM 8 8 05/12/2017     Lab Results   Component Value Date    HGB 14 6 07/13/2018    HCT 43 5 07/13/2018    MCV 98 (H) 07/13/2018     07/13/2018     Lab Results   Component Value Date    CHOL 112 05/12/2017    TRIG 150 (H) 07/13/2018    HDL 41 07/13/2018          Assessment:      74 y o  male with planned surgery as above  Known risk factors for perioperative complications: Coronary disease    Difficulty with intubation is not anticipated      Cardiac Risk Estimation: per the Revised Cardiac Risk Index (Circ  100:1043, 1999), the patient's risk factors for cardiac complications include history of ischemic heart disease, putting him in: RCI RISK CLASS II (1 risk factor, risk of major cardiac compl  appr  1 3%)     1  Preop examination    2  Carpal tunnel syndrome on left    3  Essential hypertension    4  CAD in native artery    5  Ischemic heart disease, chronic    6  Mixed hyperlipidemia    7  Obesity (BMI 35 0-39 9 without comorbidity)       Plan:      1  Preoperative workup as follows none  2  Change in medication regimen before surgery: none, continue medication regimen including morning of surgery, with sip of water  3  Prophylaxis for cardiac events with perioperative beta-blockers: continue metoprolol  4  Invasive hemodynamic monitoring perioperatively: not indicated  5  Deep vein thrombosis prophylaxis postoperatively:regimen to be chosen by surgical team     Follow up in 6 months  Lipid profile in interim

## 2018-07-23 NOTE — PROGRESS NOTES
Subjective:      Bridgette Lund is a 74 y o  male who presents to the office today for a preoperative consultation at the request of surgeon Dr Jesusita Núñez who plans on performing Carpal tunnel release on August 13  This consultation is requested for the specific conditions prompting preoperative evaluation (i e  because of potential affect on operative risk): CAD  Planned anesthesia is general  The patient has no known anesthesia issues  He denies any shortness of breath or chest pain while going up 2 flights of stairs  He has a history of RCA stent in 2014  Prior to his PCI he had a burning sensation in his chest  He previously was on atorvastatin 80 mg daily which was reduced to 40 mg daily due to myalgias and elevated creatinine kinase  Reports good endurance to medications and remains on Zetia  The following portions of the patient's history were reviewed and updated as appropriate:   He  has a past medical history of Abnormal electrocardiogram; Chest pain; Coronary artery disease; Enthesopathy of elbow (02/15/2006); Epididymo-orchitis (07/10/2012); Hyperlipidemia; Hypertension; and Voice disturbance (05/27/2008)  He  has a past surgical history that includes Coronary angioplasty with stent; Appendectomy; and Coronary angioplasty  His family history is not on file  He  reports that he has never smoked  He has never used smokeless tobacco  He reports that he does not drink alcohol or use drugs    Current Outpatient Prescriptions   Medication Sig Dispense Refill    aspirin (ASPIRIN LOW DOSE) 81 MG tablet Take 1 tablet by mouth daily      atorvastatin (LIPITOR) 40 mg tablet       Biotin 83850 MCG TABS Take 5,000 mg by mouth daily      Cholecalciferol (VITAMIN D3) 2000 units capsule Take 1 tablet by mouth daily      Coenzyme Q10 (CO Q-10) 400 MG CAPS Take 1 capsule by mouth Daily      cycloSPORINE (RESTASIS) 0 05 % ophthalmic emulsion Apply to eye      ezetimibe (ZETIA) 10 mg tablet TAKE 1 TABLET EVERY MORNING 90 tablet 2    Krill Oil 1000 MG CAPS Take 1 capsule by mouth daily      metoprolol succinate (TOPROL-XL) 25 mg 24 hr tablet       Misc Natural Products (OSTEO BI-FLEX/5-LOXIN ADVANCED) TABS Take by mouth daily      Multiple Vitamins-Minerals (BIO-35 GLUTEN-FREE PO) Take 1 capsule by mouth daily      tamsulosin (FLOMAX) 0 4 mg       benzonatate (TESSALON) 200 MG capsule Take 1 capsule (200 mg total) by mouth 3 (three) times a day as needed for cough 20 capsule 0    olmesartan (BENICAR) 20 mg tablet Take 1 tablet (20 mg total) by mouth daily 90 tablet 2     No current facility-administered medications for this visit  He has No Known Allergies       Review of Systems    Review of Systems   Constitutional: Negative for chills, fatigue and fever  HENT: Negative for congestion, nosebleeds and postnasal drip  Respiratory: Negative for cough, chest tightness and shortness of breath  Cardiovascular: Negative for chest pain, palpitations and leg swelling  Gastrointestinal: Negative for abdominal distention, abdominal pain, diarrhea, nausea and vomiting  Endocrine: Negative for polydipsia, polyphagia and polyuria  Musculoskeletal: Negative for gait problem and myalgias  Skin: Negative for color change, pallor and rash  Allergic/Immunologic: Negative for environmental allergies, food allergies and immunocompromised state  Neurological: Positive for numbness  Negative for dizziness, seizures, syncope and light-headedness  Hematological: Negative for adenopathy  Does not bruise/bleed easily  Psychiatric/Behavioral: Negative for dysphoric mood  The patient is not nervous/anxious  Objective:      Physical Exam  /60 (BP Location: Right arm, Patient Position: Sitting, Cuff Size: Large)   Pulse 76   Wt 97 3 kg (214 lb 9 6 oz)   SpO2 91%   BMI 35 71 kg/m²   Physical Exam   Constitutional: He appears healthy  No distress     HENT:   Nose: Nose normal  Mouth/Throat: Oropharynx is clear  Neck: Neck supple  No JVD present  Cardiovascular: Normal rate and regular rhythm  Exam reveals no distant heart sounds and no friction rub  No murmur heard  Pulmonary/Chest: Effort normal and breath sounds normal  He has no wheezes  He has no rales  He exhibits no tenderness  Abdominal: Soft  He exhibits no distension  There is no tenderness  Musculoskeletal: He exhibits no edema  Neurological: He is alert and oriented to person, place, and time  Skin: Skin is warm and dry  No rash noted  Cardiographics  ECG: NSR with LVH   Echocardiogram: normal and reviewed by myself    Lab Review   Lab Results   Component Value Date     05/12/2017    K 4 2 05/12/2017     05/12/2017    CO2 21 05/12/2017    BUN 15 07/13/2018    CREATININE 0 88 07/13/2018    CREATININE 0 82 05/12/2017    GLUCOSE 94 05/12/2017    CALCIUM 8 8 05/12/2017     Lab Results   Component Value Date    HGB 14 6 07/13/2018    HCT 43 5 07/13/2018    MCV 98 (H) 07/13/2018     07/13/2018     Lab Results   Component Value Date    CHOL 112 05/12/2017    TRIG 150 (H) 07/13/2018    HDL 41 07/13/2018          Assessment:      74 y o  male with planned surgery as above  Known risk factors for perioperative complications: Coronary disease    Difficulty with intubation is not anticipated  Cardiac Risk Estimation: per the Revised Cardiac Risk Index (Circ  [id number], 1999), the patient's risk factors for cardiac complications include history of ischemic heart disease, putting him in: RCI RISK CLASS II (1 risk factor, risk of major cardiac compl  appr  1 3%)     1  Preop examination    2  Carpal tunnel syndrome on left    3  Essential hypertension    4  CAD in native artery    5  Ischemic heart disease, chronic    6  Mixed hyperlipidemia    7  Obesity (BMI 35 0-39 9 without comorbidity)       Plan:      1  Preoperative workup as follows none    2  Change in medication regimen before surgery: none, continue medication regimen including morning of surgery, with sip of water  3  Prophylaxis for cardiac events with perioperative beta-blockers: continue metoprolol  4  Invasive hemodynamic monitoring perioperatively: not indicated  5  Deep vein thrombosis prophylaxis postoperatively:regimen to be chosen by surgical team     Follow up in 6 months  Lipid profile in interim

## 2018-07-27 ENCOUNTER — OFFICE VISIT (OUTPATIENT)
Dept: OBGYN CLINIC | Facility: CLINIC | Age: 68
End: 2018-07-27
Payer: MEDICARE

## 2018-07-27 ENCOUNTER — TELEPHONE (OUTPATIENT)
Dept: CARDIOLOGY CLINIC | Facility: CLINIC | Age: 68
End: 2018-07-27

## 2018-07-27 VITALS
WEIGHT: 214.8 LBS | HEART RATE: 78 BPM | DIASTOLIC BLOOD PRESSURE: 65 MMHG | BODY MASS INDEX: 35.79 KG/M2 | HEIGHT: 65 IN | SYSTOLIC BLOOD PRESSURE: 118 MMHG

## 2018-07-27 DIAGNOSIS — G56.02 CARPAL TUNNEL SYNDROME ON LEFT: Primary | ICD-10-CM

## 2018-07-27 PROCEDURE — 99214 OFFICE O/P EST MOD 30 MIN: CPT | Performed by: ORTHOPAEDIC SURGERY

## 2018-07-27 NOTE — PROGRESS NOTES
Assessment/Plan:  1  Carpal tunnel syndrome on left         Scribe Attestation    I,:   Yany Epperson am acting as a scribe while in the presence of the attending physician :        I,:   Peter Ponce MD personally performed the services described in this documentation    as scribed in my presence :              Phuong Collins today and demonstrates signs and symptoms consistent with carpal tunnel syndrome of the left wrist  I did readdress surgical procedure of carpal tunnel release for him as we had prior discussion on 3/30/2018  The risks of procedure included but are not limited to bleeding, weakness, nerve injury, increased symptoms, loss of range of motion, recurrent injury, need for further surgery and infection  Phuong Zhaopatrick did receive recent clearance from his cardiologist for carpal tunnel release  The I did discuss with Phuong Collins that it is important for him to take his beta-blocker the day of surgery  Additionally I did discuss with Phuong Collins post operative care  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 he is going to have to wait approximately 2 weeks following surgery to allow the incision to heal, and it will be approximately 4 weeks before he can lift anything with any significant weight  Phuong Collins understood the risks to the procedure as well as postoperative care and would like to continue forward with scheduling a surgery date for carpal tunnel release  I did received signed consent today  His heart lungs sounds normal exam today  I will see Phuong Collins on the day of surgery  Subjective:   Crispin Morgan is a 74 y o  male who presents left wrist and hand numbness and discomfort  This has been ongoing issue for 5-6 years  He states that he has a prior EMG study from 05/06 years ago that demonstrated carpal tunnel syndrome of the left wrist  Today he is experiencing intermittent and mild numbness and discomfort about the thumb index finger and long finger of the left hand    He states that he experiences the nighttime numbness, and pain discomfort is exacerbated with activity such as driving the due to continued pressure placed on the median nerve  Additionally he will note numbness and discomfort while 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  The discomfort is alleviated at rest   Today denies any distal paresthesias and has normal sensation  Review of Systems   Constitutional: Negative  HENT: Negative  Eyes: Negative  Respiratory: Negative  Cardiovascular: Negative  Gastrointestinal: Negative  Endocrine: Negative  Genitourinary: Negative  Musculoskeletal: Positive for arthralgias  Skin: Negative  Allergic/Immunologic: Negative  Neurological: Negative  Hematological: Negative  Psychiatric/Behavioral: Negative  Past Medical History:   Diagnosis Date    Abnormal electrocardiogram     last assessed 12/19/13    Chest pain     Coronary artery disease     Enthesopathy of elbow 02/15/2006    Epididymo-orchitis 07/10/2012    Hyperlipidemia     Hypertension     Voice disturbance 05/27/2008       Past Surgical History:   Procedure Laterality Date    APPENDECTOMY      CORONARY ANGIOPLASTY      CORONARY ANGIOPLASTY WITH STENT PLACEMENT         History reviewed  No pertinent family history  Social History     Occupational History    Not on file       Social History Main Topics    Smoking status: Never Smoker    Smokeless tobacco: Never Used    Alcohol use No    Drug use: No    Sexual activity: Not on file         Current Outpatient Prescriptions:     aspirin (ASPIRIN LOW DOSE) 81 MG tablet, Take 1 tablet by mouth daily, Disp: , Rfl:     atorvastatin (LIPITOR) 40 mg tablet, , Disp: , Rfl:     benzonatate (TESSALON) 200 MG capsule, Take 1 capsule (200 mg total) by mouth 3 (three) times a day as needed for cough, Disp: 20 capsule, Rfl: 0    Biotin 82900 MCG TABS, Take 5,000 mg by mouth daily, Disp: , Rfl:     Cholecalciferol (VITAMIN D3) 2000 units capsule, Take 1 tablet by mouth daily, Disp: , Rfl:     Coenzyme Q10 (CO Q-10) 400 MG CAPS, Take 1 capsule by mouth Daily, Disp: , Rfl:     cycloSPORINE (RESTASIS) 0 05 % ophthalmic emulsion, Apply to eye, Disp: , Rfl:     ezetimibe (ZETIA) 10 mg tablet, TAKE 1 TABLET EVERY MORNING, Disp: 90 tablet, Rfl: 2    Krill Oil 1000 MG CAPS, Take 1 capsule by mouth daily, Disp: , Rfl:     metoprolol succinate (TOPROL-XL) 25 mg 24 hr tablet, , Disp: , Rfl:     Misc Natural Products (OSTEO BI-FLEX/5-LOXIN ADVANCED) TABS, Take by mouth daily, Disp: , Rfl:     Multiple Vitamins-Minerals (BIO-35 GLUTEN-FREE PO), Take 1 capsule by mouth daily, Disp: , Rfl:     olmesartan (BENICAR) 20 mg tablet, Take 1 tablet (20 mg total) by mouth daily, Disp: 90 tablet, Rfl: 2    tamsulosin (FLOMAX) 0 4 mg, , Disp: , Rfl:     No Known Allergies    Objective:  Vitals:    07/27/18 0757   BP: 118/65   Pulse: 78       Left Hand Exam     Tenderness   The patient is experiencing no tenderness  Range of Motion     Wrist   Extension: 50   Flexion: 80   Pronation: 90   Supination: 90     Hand   MP Thumb: 90   DIP Thumb: 90     Muscle Strength   Wrist Extension: 5/5   Wrist Flexion: 5/5   :  5/5     Tests   Tinels Sign (Medial Nerve): negative    Other   Erythema: absent  Scars: absent  Sensation: normal  Pulse: present    Comments:  Durken's: (+)  Phalens (+)  EBL:  5/5  APB:  5/5  FPB:  5/5              Strength/Myotome Testing     Left Wrist/Hand   Wrist extension: 5  Wrist flexion: 5    Tests     Left Wrist/Hand   Negative Tinel's sign (medial nerve)  Physical Exam   Constitutional: He is oriented to person, place, and time  He appears well-developed and well-nourished  HENT:   Head: Atraumatic  Eyes: Conjunctivae are normal    Neck: Normal range of motion  Cardiovascular: Normal rate and normal heart sounds      Pulmonary/Chest: Effort normal and breath sounds normal    Musculoskeletal:   As noted in HPI   Neurological: He is alert and oriented to person, place, and time  Skin: Skin is warm and dry  Psychiatric: He has a normal mood and affect  His behavior is normal  Judgment and thought content normal    Nursing note and vitals reviewed

## 2018-08-07 RX ORDER — COVID-19 ANTIGEN TEST
KIT MISCELLANEOUS
COMMUNITY
End: 2021-09-24

## 2018-08-07 NOTE — PRE-PROCEDURE INSTRUCTIONS
My Surgical Experience    The following information was developed to assist you to prepare for your operation  What do I need to do before coming to the hospital?   Arrange for a responsible person to drive you to and from the hospital    Arrange care for your children at home  Children are not allowed in the recovery areas of the hospital   Plan to wear clothing that is easy to put on and take off  If you are having shoulder surgery, wear a shirt that buttons or zippers in the front  Bathing  o Shower the evening before and the morning of your surgery with an antibacterial soap  Please refer to the Pre Op Showering Instructions for Surgery Patients Sheet   o Remove nail polish and all body piercing jewelry  o Do not shave any body part for at least 24 hours before surgery-this includes face, arms, legs and upper body  Food  o Nothing to eat or drink after midnight the night before your surgery  This includes candy and chewing gum  o Exception: If your surgery is after 12:00pm (noon), you may have clear liquids such as 7-Up®, ginger ale, apple or cranberry juice, Jell-O®, water, or clear broth until 8:00 am  o Do not drink milk or juice with pulp on the morning before surgery  o Do not drink alcohol 24 hours before surgery  Medicine  o Follow instructions you received from your surgeon about which medicines you may take on the day of surgery  o If instructed to take medicine on the morning of surgery, take pills with just a small sip of water  Call your prescribing doctor for specific infroamtion on what to do if you take insulin    What should I bring to the hospital?    Bring:  Rollo Bong or a walker, if you have them, for foot or knee surgery   A list of the daily medicines, vitamins, minerals, herbals and nutritional supplements you take   Include the dosages of medicines and the time you take them each day   Glasses, dentures or hearing aids   Minimal clothing; you will be wearing hospital sleepwear   Photo ID; required to verify your identity   If you have a Living Will or Power of , bring a copy of the documents   If you have an ostomy, bring an extra pouch and any supplies you use    Do not bring   Medicines or inhalers   Money, valuables or jewelry    What other information should I know about the day of surgery?  Notify your surgeons if you develop a cold, sore throat, cough, fever, rash or any other illness   Report to the Ambulatory Surgical/Same Day Surgery Unit   You will be instructed to stop at Registration only if you have not been pre-registered   Inform your  fi they do not stay that they will be asked by the staff to leave a phone number where they can be reached   Be available to be reached before surgery  In the event the operating room schedule changes, you may be asked to come in earlier or later than expected    *It is important to tell your doctor and others involved in your health care if you are taking or have been taking any non-prescription drugs, vitamins, minerals, herbals or other nutritional supplements  Any of these may interact with some food or medicines and cause a reaction      Pre-Surgery Instructions:   Medication Instructions    aspirin (ASPIRIN LOW DOSE) 81 MG tablet Instructed patient per Anesthesia Guidelines   atorvastatin (LIPITOR) 40 mg tablet Instructed patient per Anesthesia Guidelines   Biotin 02570 MCG TABS Instructed patient per Anesthesia Guidelines   Cholecalciferol (VITAMIN D3) 2000 units capsule Instructed patient per Anesthesia Guidelines   Coenzyme Q10 (CO Q-10) 400 MG CAPS Instructed patient per Anesthesia Guidelines   cycloSPORINE (RESTASIS) 0 05 % ophthalmic emulsion Instructed patient per Anesthesia Guidelines   ezetimibe (ZETIA) 10 mg tablet Instructed patient per Anesthesia Guidelines   Krill Oil 1000 MG CAPS Instructed patient per Anesthesia Guidelines      metoprolol succinate (TOPROL-XL) 25 mg 24 hr tablet Instructed patient per Anesthesia Guidelines   Misc Natural Products (OSTEO BI-FLEX/5-LOXIN ADVANCED) TABS Instructed patient per Anesthesia Guidelines   Multiple Vitamins-Minerals (BIO-35 GLUTEN-FREE PO) Instructed patient per Anesthesia Guidelines   Naproxen Sodium (ALEVE) 220 MG CAPS Instructed patient per Anesthesia Guidelines   olmesartan (BENICAR) 20 mg tablet Instructed patient per Anesthesia Guidelines   tamsulosin (FLOMAX) 0 4 mg Instructed patient per Anesthesia Guidelines  To take benicar, metoprolol and eye drops a m   Of surge surgery

## 2018-08-09 ENCOUNTER — CLINICAL SUPPORT (OUTPATIENT)
Dept: CARDIOLOGY CLINIC | Facility: CLINIC | Age: 68
End: 2018-08-09
Payer: MEDICARE

## 2018-08-09 VITALS
DIASTOLIC BLOOD PRESSURE: 60 MMHG | HEART RATE: 64 BPM | OXYGEN SATURATION: 93 % | SYSTOLIC BLOOD PRESSURE: 110 MMHG | BODY MASS INDEX: 35.76 KG/M2 | WEIGHT: 214.9 LBS

## 2018-08-09 DIAGNOSIS — I10 BENIGN ESSENTIAL HTN: Primary | ICD-10-CM

## 2018-08-09 PROCEDURE — 99211 OFF/OP EST MAY X REQ PHY/QHP: CPT

## 2018-08-09 RX ORDER — ACETAMINOPHEN 325 MG/1
650 TABLET ORAL EVERY 6 HOURS PRN
COMMUNITY
End: 2019-06-05

## 2018-08-13 ENCOUNTER — HOSPITAL ENCOUNTER (OUTPATIENT)
Facility: HOSPITAL | Age: 68
Setting detail: OUTPATIENT SURGERY
Discharge: HOME | End: 2018-08-13
Attending: ORTHOPAEDIC SURGERY | Admitting: ORTHOPAEDIC SURGERY
Payer: MEDICARE

## 2018-08-13 ENCOUNTER — ANESTHESIA EVENT (OUTPATIENT)
Dept: PERIOP | Facility: HOSPITAL | Age: 68
End: 2018-08-13
Payer: MEDICARE

## 2018-08-13 ENCOUNTER — ANESTHESIA (OUTPATIENT)
Dept: PERIOP | Facility: HOSPITAL | Age: 68
End: 2018-08-13
Payer: MEDICARE

## 2018-08-13 VITALS
SYSTOLIC BLOOD PRESSURE: 145 MMHG | DIASTOLIC BLOOD PRESSURE: 87 MMHG | TEMPERATURE: 97.6 F | RESPIRATION RATE: 18 BRPM | OXYGEN SATURATION: 95 % | HEART RATE: 65 BPM

## 2018-08-13 PROCEDURE — 64721 CARPAL TUNNEL SURGERY: CPT | Performed by: ORTHOPAEDIC SURGERY

## 2018-08-13 RX ORDER — PROPOFOL 10 MG/ML
INJECTION, EMULSION INTRAVENOUS CONTINUOUS PRN
Status: DISCONTINUED | OUTPATIENT
Start: 2018-08-13 | End: 2018-08-13 | Stop reason: SURG

## 2018-08-13 RX ORDER — FENTANYL CITRATE/PF 50 MCG/ML
25 SYRINGE (ML) INJECTION
Status: DISCONTINUED | OUTPATIENT
Start: 2018-08-13 | End: 2018-08-13 | Stop reason: HOSPADM

## 2018-08-13 RX ORDER — ONDANSETRON 2 MG/ML
4 INJECTION INTRAMUSCULAR; INTRAVENOUS ONCE AS NEEDED
Status: DISCONTINUED | OUTPATIENT
Start: 2018-08-13 | End: 2018-08-13 | Stop reason: HOSPADM

## 2018-08-13 RX ORDER — SODIUM CHLORIDE, SODIUM LACTATE, POTASSIUM CHLORIDE, CALCIUM CHLORIDE 600; 310; 30; 20 MG/100ML; MG/100ML; MG/100ML; MG/100ML
75 INJECTION, SOLUTION INTRAVENOUS CONTINUOUS
Status: DISCONTINUED | OUTPATIENT
Start: 2018-08-13 | End: 2018-08-13 | Stop reason: HOSPADM

## 2018-08-13 RX ORDER — MIDAZOLAM HYDROCHLORIDE 1 MG/ML
INJECTION INTRAMUSCULAR; INTRAVENOUS AS NEEDED
Status: DISCONTINUED | OUTPATIENT
Start: 2018-08-13 | End: 2018-08-13 | Stop reason: SURG

## 2018-08-13 RX ORDER — LIDOCAINE HYDROCHLORIDE 10 MG/ML
INJECTION, SOLUTION INFILTRATION; PERINEURAL AS NEEDED
Status: DISCONTINUED | OUTPATIENT
Start: 2018-08-13 | End: 2018-08-13 | Stop reason: SURG

## 2018-08-13 RX ORDER — PROPOFOL 10 MG/ML
INJECTION, EMULSION INTRAVENOUS AS NEEDED
Status: DISCONTINUED | OUTPATIENT
Start: 2018-08-13 | End: 2018-08-13 | Stop reason: SURG

## 2018-08-13 RX ORDER — FENTANYL CITRATE 50 UG/ML
INJECTION, SOLUTION INTRAMUSCULAR; INTRAVENOUS AS NEEDED
Status: DISCONTINUED | OUTPATIENT
Start: 2018-08-13 | End: 2018-08-13 | Stop reason: SURG

## 2018-08-13 RX ADMIN — Medication 20 MG: at 12:44

## 2018-08-13 RX ADMIN — PROPOFOL 50 MG: 10 INJECTION, EMULSION INTRAVENOUS at 12:44

## 2018-08-13 RX ADMIN — FENTANYL CITRATE 50 MCG: 50 INJECTION, SOLUTION INTRAMUSCULAR; INTRAVENOUS at 12:44

## 2018-08-13 RX ADMIN — SODIUM CHLORIDE, SODIUM LACTATE, POTASSIUM CHLORIDE, AND CALCIUM CHLORIDE 75 ML/HR: .6; .31; .03; .02 INJECTION, SOLUTION INTRAVENOUS at 10:16

## 2018-08-13 RX ADMIN — PROPOFOL 60 MCG/KG/MIN: 10 INJECTION, EMULSION INTRAVENOUS at 12:44

## 2018-08-13 RX ADMIN — LIDOCAINE HYDROCHLORIDE 30 MG: 10 INJECTION, SOLUTION INFILTRATION; PERINEURAL at 12:44

## 2018-08-13 RX ADMIN — CEFAZOLIN SODIUM 2000 MG: 2 SOLUTION INTRAVENOUS at 12:41

## 2018-08-13 RX ADMIN — MIDAZOLAM HYDROCHLORIDE 1 MG: 1 INJECTION, SOLUTION INTRAMUSCULAR; INTRAVENOUS at 12:41

## 2018-08-13 RX ADMIN — MIDAZOLAM HYDROCHLORIDE 1 MG: 1 INJECTION, SOLUTION INTRAMUSCULAR; INTRAVENOUS at 12:38

## 2018-08-13 NOTE — ANESTHESIA PREPROCEDURE EVALUATION
Essential hypertension   CAD in native artery   Obesity (BMI 35 0-39 9 without comorbidity)   Enlarged prostate without lower urinary tract symptoms (luts)   Hyperlipidemia   Ischemic heart disease, chronic     Review of Systems/Medical History  Patient summary reviewed  Chart reviewed      Cardiovascular  Hyperlipidemia, Hypertension , CAD , Cardiac stents     Pulmonary  Negative pulmonary ROS        GI/Hepatic  Negative GI/hepatic ROS          Prostatic disorder, benign prostatic hyperplasia       Endo/Other    Obesity  morbid obesity   GYN       Hematology  Negative hematology ROS      Musculoskeletal  Negative musculoskeletal ROS        Neurology  Negative neurology ROS      Psychology   Negative psychology ROS              Physical Exam    Airway    Mallampati score: II  TM Distance: >3 FB  Neck ROM: full     Dental       Cardiovascular  Rhythm: regular, Rate: normal,     Pulmonary  Breath sounds clear to auscultation,     Other Findings        Anesthesia Plan  ASA Score- 2     Anesthesia Type- IV sedation with anesthesia with ASA Monitors  Additional Monitors:   Airway Plan:         Plan Factors-    Induction- intravenous  Postoperative Plan-     Informed Consent- Anesthetic plan and risks discussed with patient  I personally reviewed this patient with the CRNA  Discussed and agreed on the Anesthesia Plan with the CRNA  Meredith Caputo

## 2018-08-13 NOTE — H&P (VIEW-ONLY)
Assessment/Plan:  1  Carpal tunnel syndrome on left         Scribe Attestation    I,:   Adan Leyva am acting as a scribe while in the presence of the attending physician :        I,:   Mary Butt MD personally performed the services described in this documentation    as scribed in my presence :              Pascual Ny today and demonstrates signs and symptoms consistent with carpal tunnel syndrome of the left wrist  I did readdress surgical procedure of carpal tunnel release for him as we had prior discussion on 3/30/2018  The risks of procedure included but are not limited to bleeding, weakness, nerve injury, increased symptoms, loss of range of motion, recurrent injury, need for further surgery and infection  Pascual Ny did receive recent clearance from his cardiologist for carpal tunnel release  The I did discuss with Pascual Ny that it is important for him to take his beta-blocker the day of surgery  Additionally I did discuss with Pascual Ny post operative care  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 he is going to have to wait approximately 2 weeks following surgery to allow the incision to heal, and it will be approximately 4 weeks before he can lift anything with any significant weight  Pascual Ny understood the risks to the procedure as well as postoperative care and would like to continue forward with scheduling a surgery date for carpal tunnel release  I did received signed consent today  His heart lungs sounds normal exam today  I will see Pascual Ny on the day of surgery  Subjective:   Lucian Noe is a 74 y o  male who presents left wrist and hand numbness and discomfort  This has been ongoing issue for 5-6 years  He states that he has a prior EMG study from 05/06 years ago that demonstrated carpal tunnel syndrome of the left wrist  Today he is experiencing intermittent and mild numbness and discomfort about the thumb index finger and long finger of the left hand    He states that he experiences the nighttime numbness, and pain discomfort is exacerbated with activity such as driving the due to continued pressure placed on the median nerve  Additionally he will note numbness and discomfort while 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  The discomfort is alleviated at rest   Today denies any distal paresthesias and has normal sensation  Review of Systems   Constitutional: Negative  HENT: Negative  Eyes: Negative  Respiratory: Negative  Cardiovascular: Negative  Gastrointestinal: Negative  Endocrine: Negative  Genitourinary: Negative  Musculoskeletal: Positive for arthralgias  Skin: Negative  Allergic/Immunologic: Negative  Neurological: Negative  Hematological: Negative  Psychiatric/Behavioral: Negative  Past Medical History:   Diagnosis Date    Abnormal electrocardiogram     last assessed 12/19/13    Chest pain     Coronary artery disease     Enthesopathy of elbow 02/15/2006    Epididymo-orchitis 07/10/2012    Hyperlipidemia     Hypertension     Voice disturbance 05/27/2008       Past Surgical History:   Procedure Laterality Date    APPENDECTOMY      CORONARY ANGIOPLASTY      CORONARY ANGIOPLASTY WITH STENT PLACEMENT         History reviewed  No pertinent family history  Social History     Occupational History    Not on file       Social History Main Topics    Smoking status: Never Smoker    Smokeless tobacco: Never Used    Alcohol use No    Drug use: No    Sexual activity: Not on file         Current Outpatient Prescriptions:     aspirin (ASPIRIN LOW DOSE) 81 MG tablet, Take 1 tablet by mouth daily, Disp: , Rfl:     atorvastatin (LIPITOR) 40 mg tablet, , Disp: , Rfl:     benzonatate (TESSALON) 200 MG capsule, Take 1 capsule (200 mg total) by mouth 3 (three) times a day as needed for cough, Disp: 20 capsule, Rfl: 0    Biotin 17414 MCG TABS, Take 5,000 mg by mouth daily, Disp: , Rfl:     Cholecalciferol (VITAMIN D3) 2000 units capsule, Take 1 tablet by mouth daily, Disp: , Rfl:     Coenzyme Q10 (CO Q-10) 400 MG CAPS, Take 1 capsule by mouth Daily, Disp: , Rfl:     cycloSPORINE (RESTASIS) 0 05 % ophthalmic emulsion, Apply to eye, Disp: , Rfl:     ezetimibe (ZETIA) 10 mg tablet, TAKE 1 TABLET EVERY MORNING, Disp: 90 tablet, Rfl: 2    Krill Oil 1000 MG CAPS, Take 1 capsule by mouth daily, Disp: , Rfl:     metoprolol succinate (TOPROL-XL) 25 mg 24 hr tablet, , Disp: , Rfl:     Misc Natural Products (OSTEO BI-FLEX/5-LOXIN ADVANCED) TABS, Take by mouth daily, Disp: , Rfl:     Multiple Vitamins-Minerals (BIO-35 GLUTEN-FREE PO), Take 1 capsule by mouth daily, Disp: , Rfl:     olmesartan (BENICAR) 20 mg tablet, Take 1 tablet (20 mg total) by mouth daily, Disp: 90 tablet, Rfl: 2    tamsulosin (FLOMAX) 0 4 mg, , Disp: , Rfl:     No Known Allergies    Objective:  Vitals:    07/27/18 0757   BP: 118/65   Pulse: 78       Left Hand Exam     Tenderness   The patient is experiencing no tenderness  Range of Motion     Wrist   Extension: 50   Flexion: 80   Pronation: 90   Supination: 90     Hand   MP Thumb: 90   DIP Thumb: 90     Muscle Strength   Wrist Extension: 5/5   Wrist Flexion: 5/5   :  5/5     Tests   Tinels Sign (Medial Nerve): negative    Other   Erythema: absent  Scars: absent  Sensation: normal  Pulse: present    Comments:  Durken's: (+)  Phalens (+)  EBL:  5/5  APB:  5/5  FPB:  5/5              Strength/Myotome Testing     Left Wrist/Hand   Wrist extension: 5  Wrist flexion: 5    Tests     Left Wrist/Hand   Negative Tinel's sign (medial nerve)  Physical Exam   Constitutional: He is oriented to person, place, and time  He appears well-developed and well-nourished  HENT:   Head: Atraumatic  Eyes: Conjunctivae are normal    Neck: Normal range of motion  Cardiovascular: Normal rate and normal heart sounds      Pulmonary/Chest: Effort normal and breath sounds normal    Musculoskeletal:   As noted in HPI   Neurological: He is alert and oriented to person, place, and time  Skin: Skin is warm and dry  Psychiatric: He has a normal mood and affect  His behavior is normal  Judgment and thought content normal    Nursing note and vitals reviewed

## 2018-08-13 NOTE — PERIOPERATIVE NURSING NOTE
Received from pacu awake and alert  Denies any pain  Ace bandage and ice maintained to left  Wrist  Fingers warmwith brisk capillary refill

## 2018-08-13 NOTE — OP NOTE
OPERATIVE REPORT  PATIENT NAME: Leslie Arrieta    :  1950  MRN: [id number]  Pt Location: [facility] ROOM 04    SURGERY DATE: 2018    Surgeon(s) and Role:     * Kamlesh Lopez MD - Primary     * Mary Amaya PA-C - Assisting    Preop Diagnosis:  Carpal tunnel syndrome on left [G56 02]    Post-Op Diagnosis Codes:     * Carpal tunnel syndrome on left [G56 02]    Procedure(s) (LRB):  RELEASE CARPAL TUNNEL (Left)    Specimen(s):  * No specimens in log *    Estimated Blood Loss:   0 mL    Drains:       Anesthesia Type:   IV Sedation with local Anesthesia as a median nerve block after alcohol prep with a mixture of 1% lidocaine plain and 0 5% Marcaine plain    Operative Indications:  Carpal tunnel syndrome on left [G56 02]  Edgar Fuentes is a 72-year-old male who has been suffering with left hand pain and numbness  EMG demonstrated carpal tunnel syndrome  She understood the risks and benefits of a left hand carpal tunnel release and wished to go ahead  The risks are inclusive of but not limited to infection, stiffness, nerve injury causing numbness pain and weakness, failure to regain full strength and ability, worsening of symptoms, and need for further surgery  Operative Findings:  Left hand with a very tight carpal tunnel evaluated in the loupe magnification that was readily released completely with direct inspection as well as direct palpation  The ligament was completely released  Complications:   None    Procedure and Technique:  Abilio Vicente was taken to operating room and placed supine on the OR table  He was given preoperative IV antibiotics  We took a surgical time-out  Sedation was performed  We did prep the left hand and performed a median nerve block as described above  Left upper extremity was then prepped and draped in the usual sterile fashion  We exsanguinated and inflated tourniquet  Total tourniquet time was less than 15 minutes    We did make a longitudinal incision under loupe magnification just proximal to Lundberg's cardinal line along the ulnar border flexed ring finger utilizing a 15 blade  We did carefully dissect past subcutaneous structures to the palmar fascia which was longitudinally split with a deep 15 blade  We then came down upon the transverse carpal ligament and did perform longitudinal split of that with a deep 15 blade under direct visualization and protecting the median nerve deep to this structure  We then released the ligament proximally and distally utilizing scissors under direct visualization  We then probed with a [place] elevator and found this to be very nice and complete release  The nerve appeared to be nicely freed up  Then irrigated thoroughly and closed with a 4-0 nylon suture  Dry, sterile dressings were applied with an Ace bandage  Tourniquet was let down  He tolerated the procedure well and transferred to recovery room stable condition   He will follow up with me in approximately 10 days for suture removal    I was present for the entire procedure and A qualified resident physician was not available    Patient Disposition:  PACU     SIGNATURE: Mary Butt MD  DATE: August 13, 2018  TIME: 1:25 PM

## 2018-08-13 NOTE — DISCHARGE INSTRUCTIONS
INSTRUCTIONS FOLLOWING YOUR HAND/WRIST SURGERY     First follow-up visit after surgery   Call the office the day after your surgery & make an appointment for 10-14 days after surgery to see Dr Toro Diaz  At that appointment, your sutures will be removed  Dressing & Wound Care   If you are in a splint, keep the original dressing on and dry until you are seen by Dr Toro Diaz  You may take a bath by covering your arm in a garbage bag or other waterproof bag  Tie it securely with rubber bands and duct tape  Keep your dressing as clean as possible     If you are only in a soft dressing, you may take the dressing off at 3 days after your operation; at which point you may shower and get the incision wet  Do not soak the wound in dish, bath, or pool water until after the stitches have been removed     Swelling and Discoloration   You will notice some swelling of your hand - this is normal  Elevate your hand above the level of your heart as much as possible for the first week  When you sleep, place your hand on several pillows  You may notice a bluish discoloration of your fingers  This is also normal  This discoloration may change from blue to purple to green to yellow, then will slowly go away over a few weeks time  Discomfort   You will experience some pain and discomfort after surgery  By the third day, this pain usually decreases significantly  You will be given a prescription for pain medication  Take the medication as prescribed  If the discomfort is mild, you can take 1 or 2 Tylenol every 4 - 6 hours as needed, instead of the prescribed pain medication  Temperature   A temperature of up to 101  5ºF is common for the first 2 days after surgery  If your temperature is elevated above 101 5º F, call the office  Exercise   Unless instructed otherwise, you may gently open and close your fingers  Do not perform any exercises that cause you to sweat   Sweating may increase complications with your incision  We hope this answers many of your questions regarding your surgery  These are only guidelines however  If you have any other concerns or questions at any time, do not hesitate to call the office [phone]

## 2018-08-16 ENCOUNTER — TELEPHONE (OUTPATIENT)
Dept: OBGYN CLINIC | Facility: HOSPITAL | Age: 68
End: 2018-08-16

## 2018-08-16 NOTE — TELEPHONE ENCOUNTER
Called Madeleine Lindo and left message advising, provided number to call back with any further questions

## 2018-08-16 NOTE — TELEPHONE ENCOUNTER
Harsha Lau  [phone]    Patient removed bandages and padding is in stitches and wanted call back before showering

## 2018-08-22 ENCOUNTER — OFFICE VISIT (OUTPATIENT)
Dept: OBGYN CLINIC | Facility: CLINIC | Age: 68
End: 2018-08-22

## 2018-08-22 VITALS
SYSTOLIC BLOOD PRESSURE: 142 MMHG | BODY MASS INDEX: 35.96 KG/M2 | DIASTOLIC BLOOD PRESSURE: 71 MMHG | WEIGHT: 215.8 LBS | HEART RATE: 80 BPM | HEIGHT: 65 IN

## 2018-08-22 DIAGNOSIS — G56.02 CARPAL TUNNEL SYNDROME ON LEFT: Primary | ICD-10-CM

## 2018-08-22 PROCEDURE — 99024 POSTOP FOLLOW-UP VISIT: CPT | Performed by: ORTHOPAEDIC SURGERY

## 2018-08-22 NOTE — PROGRESS NOTES
Assessment/Plan:  1  Carpal tunnel syndrome on left         Scribe Attestation    I,:   Brina Hatch MA am acting as a scribe while in the presence of the attending physician :        I,:   Peter Ponce MD personally performed the services described in this documentation    as scribed in my presence :              I discussed with Phuong Collins that he is doing well post operatively  His stiches were removed in the office today with out complication and steri strips were applied  The incision is healing well  He was instructed he can get the incision site wet but no swimming or soaking for an additional week  He is aware there is no heavy lifting for 4 weeks post op  He was instructed to move the finger daily and starting next week 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  He will give the office a call when he would like to proceed with his right CTR  Subjective:   Crispin Morgan is a 74 y o  male who presents to the office 9 days s/p left CTR performed on 8/13/18  The pt states he is doing well post operatively and he is very pleased with his outcome  He states he has complete resolution of his symptoms  He has no numbness or tingling into his hand or fingers just some mild soreness  Review of Systems   Constitutional: Negative for chills and fever  HENT: Negative for drooling and sneezing  Eyes: Negative for redness  Respiratory: Negative for cough and wheezing  Gastrointestinal: Negative for nausea and vomiting  Musculoskeletal: Negative for arthralgias, joint swelling and myalgias  Neurological: Negative for weakness and numbness  Psychiatric/Behavioral: Negative for behavioral problems  The patient is not nervous/anxious            Past Medical History:   Diagnosis Date    Abnormal electrocardiogram     last assessed 12/19/13    Chest pain     Coronary artery disease     Enthesopathy of elbow 02/15/2006    Epididymo-orchitis 07/10/2012    Hyperlipidemia     Hypertension  Obesity     Voice disturbance 05/27/2008       Past Surgical History:   Procedure Laterality Date    APPENDECTOMY      CORONARY ANGIOPLASTY      CORONARY ANGIOPLASTY WITH STENT PLACEMENT      CORONARY ANGIOPLASTY WITH STENT PLACEMENT  2013    PA REVISE MEDIAN N/CARPAL TUNNEL SURG Left 8/13/2018    Procedure: RELEASE CARPAL TUNNEL;  Surgeon: Ld Chung MD;  Location: [address] [place] [address];  Service: Orthopedics       History reviewed  No pertinent family history  Social History     Occupational History    Not on file       Social History Main Topics    Smoking status: Never Smoker    Smokeless tobacco: Never Used    Alcohol use No    Drug use: No    Sexual activity: Not on file         Current Outpatient Prescriptions:     acetaminophen (TYLENOL) 325 mg tablet, Take 650 mg by mouth every 6 (six) hours as needed for mild pain, Disp: , Rfl:     aspirin (ASPIRIN LOW DOSE) 81 MG tablet, Take 1 tablet by mouth daily Last dose is 8/7/18 , Disp: , Rfl:     atorvastatin (LIPITOR) 40 mg tablet, daily at bedtime  , Disp: , Rfl:     benzonatate (TESSALON) 200 MG capsule, Take 1 capsule (200 mg total) by mouth 3 (three) times a day as needed for cough, Disp: 20 capsule, Rfl: 0    Biotin 57742 MCG TABS, Take 5,000 mg by mouth daily, Disp: , Rfl:     Cholecalciferol (VITAMIN D3) 2000 units capsule, Take 2,000 Units by mouth daily  , Disp: , Rfl:     Coenzyme Q10 (CO Q-10) 400 MG CAPS, Take 1 capsule by mouth Daily, Disp: , Rfl:     cycloSPORINE (RESTASIS) 0 05 % ophthalmic emulsion, Administer to both eyes every 12 (twelve) hours  , Disp: , Rfl:     ezetimibe (ZETIA) 10 mg tablet, TAKE 1 TABLET EVERY MORNING, Disp: 90 tablet, Rfl: 2    Krill Oil 1000 MG CAPS, Take 1 capsule by mouth daily, Disp: , Rfl:     metoprolol succinate (TOPROL-XL) 25 mg 24 hr tablet, 25 mg daily  , Disp: , Rfl:     Misc Natural Products (OSTEO BI-FLEX/5-LOXIN ADVANCED) TABS, Take by mouth daily, Disp: , Rfl:     Multiple Vitamins-Minerals (BIO-35 GLUTEN-FREE PO), Take 1 capsule by mouth daily, Disp: , Rfl:     Naproxen Sodium (ALEVE) 220 MG CAPS, Take by mouth, Disp: , Rfl:     olmesartan (BENICAR) 20 mg tablet, Take 1 tablet (20 mg total) by mouth daily, Disp: 90 tablet, Rfl: 2    tamsulosin (FLOMAX) 0 4 mg, daily with dinner  , Disp: , Rfl:     No Known Allergies    Objective:  Vitals:    08/22/18 0858   BP: 142/71   Pulse: 80       Left Hand Exam     Other   Sensation: normal    Comments:  [place] appearing wound, no signs of infection  Stiches removed and steri strips applied   Can actively move all digits             Physical Exam   Constitutional: He is oriented to person, place, and time  He appears well-developed and well-nourished  HENT:   Head: Atraumatic  Eyes: Conjunctivae are normal    Neck: Normal range of motion  Cardiovascular: Normal rate  Pulmonary/Chest: Effort normal    Musculoskeletal:   As noted in HPI   Neurological: He is alert and oriented to person, place, and time  Skin: Skin is warm and dry  Psychiatric: He has a normal mood and affect   His behavior is normal  Judgment and thought content normal

## 2018-08-29 NOTE — H&P (VIEW-ONLY)
Assessment/Plan:  1  Carpal tunnel syndrome on left         Scribe Attestation    I,:   Brina Hatch MA am acting as a scribe while in the presence of the attending physician :        I,:   Khurram Rosas MD personally performed the services described in this documentation    as scribed in my presence :              I discussed with Selena Nicely that he is doing well post operatively  His stiches were removed in the office today with out complication and steri strips were applied  The incision is healing well  He was instructed he can get the incision site wet but no swimming or soaking for an additional week  He is aware there is no heavy lifting for 4 weeks post op  He was instructed to move the finger daily and starting next week 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  He will give the office a call when he would like to proceed with his right CTR  Subjective:   Jayson Montilla is a 74 y o  male who presents to the office 9 days s/p left CTR performed on 8/13/18  The pt states he is doing well post operatively and he is very pleased with his outcome  He states he has complete resolution of his symptoms  He has no numbness or tingling into his hand or fingers just some mild soreness  Review of Systems   Constitutional: Negative for chills and fever  HENT: Negative for drooling and sneezing  Eyes: Negative for redness  Respiratory: Negative for cough and wheezing  Gastrointestinal: Negative for nausea and vomiting  Musculoskeletal: Negative for arthralgias, joint swelling and myalgias  Neurological: Negative for weakness and numbness  Psychiatric/Behavioral: Negative for behavioral problems  The patient is not nervous/anxious            Past Medical History:   Diagnosis Date    Abnormal electrocardiogram     last assessed 12/19/13    Chest pain     Coronary artery disease     Enthesopathy of elbow 02/15/2006    Epididymo-orchitis 07/10/2012    Hyperlipidemia     Hypertension  Obesity     Voice disturbance 05/27/2008       Past Surgical History:   Procedure Laterality Date    APPENDECTOMY      CORONARY ANGIOPLASTY      CORONARY ANGIOPLASTY WITH STENT PLACEMENT      CORONARY ANGIOPLASTY WITH STENT PLACEMENT  2013    SD REVISE MEDIAN N/CARPAL TUNNEL SURG Left 8/13/2018    Procedure: RELEASE CARPAL TUNNEL;  Surgeon: Sergio Preciado MD;  Location: [address] [place] [address];  Service: Orthopedics       History reviewed  No pertinent family history  Social History     Occupational History    Not on file       Social History Main Topics    Smoking status: Never Smoker    Smokeless tobacco: Never Used    Alcohol use No    Drug use: No    Sexual activity: Not on file         Current Outpatient Prescriptions:     acetaminophen (TYLENOL) 325 mg tablet, Take 650 mg by mouth every 6 (six) hours as needed for mild pain, Disp: , Rfl:     aspirin (ASPIRIN LOW DOSE) 81 MG tablet, Take 1 tablet by mouth daily Last dose is 8/7/18 , Disp: , Rfl:     atorvastatin (LIPITOR) 40 mg tablet, daily at bedtime  , Disp: , Rfl:     benzonatate (TESSALON) 200 MG capsule, Take 1 capsule (200 mg total) by mouth 3 (three) times a day as needed for cough, Disp: 20 capsule, Rfl: 0    Biotin 64117 MCG TABS, Take 5,000 mg by mouth daily, Disp: , Rfl:     Cholecalciferol (VITAMIN D3) 2000 units capsule, Take 2,000 Units by mouth daily  , Disp: , Rfl:     Coenzyme Q10 (CO Q-10) 400 MG CAPS, Take 1 capsule by mouth Daily, Disp: , Rfl:     cycloSPORINE (RESTASIS) 0 05 % ophthalmic emulsion, Administer to both eyes every 12 (twelve) hours  , Disp: , Rfl:     ezetimibe (ZETIA) 10 mg tablet, TAKE 1 TABLET EVERY MORNING, Disp: 90 tablet, Rfl: 2    Krill Oil 1000 MG CAPS, Take 1 capsule by mouth daily, Disp: , Rfl:     metoprolol succinate (TOPROL-XL) 25 mg 24 hr tablet, 25 mg daily  , Disp: , Rfl:     Misc Natural Products (OSTEO BI-FLEX/5-LOXIN ADVANCED) TABS, Take by mouth daily, Disp: , Rfl:     Multiple Vitamins-Minerals (BIO-35 GLUTEN-FREE PO), Take 1 capsule by mouth daily, Disp: , Rfl:     Naproxen Sodium (ALEVE) 220 MG CAPS, Take by mouth, Disp: , Rfl:     olmesartan (BENICAR) 20 mg tablet, Take 1 tablet (20 mg total) by mouth daily, Disp: 90 tablet, Rfl: 2    tamsulosin (FLOMAX) 0 4 mg, daily with dinner  , Disp: , Rfl:     No Known Allergies    Objective:  Vitals:    08/22/18 0858   BP: 142/71   Pulse: 80       Left Hand Exam     Other   Sensation: normal    Comments:  [place] appearing wound, no signs of infection  Stiches removed and steri strips applied   Can actively move all digits             Physical Exam   Constitutional: He is oriented to person, place, and time  He appears well-developed and well-nourished  HENT:   Head: Atraumatic  Eyes: Conjunctivae are normal    Neck: Normal range of motion  Cardiovascular: Normal rate  Pulmonary/Chest: Effort normal    Musculoskeletal:   As noted in HPI   Neurological: He is alert and oriented to person, place, and time  Skin: Skin is warm and dry  Psychiatric: He has a normal mood and affect   His behavior is normal  Judgment and thought content normal

## 2018-08-30 DIAGNOSIS — E78.5 DYSLIPIDEMIA: Primary | ICD-10-CM

## 2018-09-01 RX ORDER — ATORVASTATIN CALCIUM 40 MG/1
TABLET, FILM COATED ORAL
Qty: 90 TABLET | Refills: 2 | Status: SHIPPED | OUTPATIENT
Start: 2018-09-01 | End: 2019-06-02 | Stop reason: SDUPTHER

## 2018-09-15 DIAGNOSIS — I25.10 CORONARY ARTERY DISEASE INVOLVING NATIVE CORONARY ARTERY OF NATIVE HEART WITHOUT ANGINA PECTORIS: Primary | ICD-10-CM

## 2018-09-15 DIAGNOSIS — N40.0 BPH WITHOUT URINARY OBSTRUCTION: Primary | ICD-10-CM

## 2018-09-15 RX ORDER — TAMSULOSIN HYDROCHLORIDE 0.4 MG/1
CAPSULE ORAL
Qty: 90 CAPSULE | Refills: 0 | Status: SHIPPED | OUTPATIENT
Start: 2018-09-15 | End: 2018-11-19 | Stop reason: SDUPTHER

## 2018-09-17 RX ORDER — METOPROLOL SUCCINATE 25 MG/1
TABLET, EXTENDED RELEASE ORAL
Qty: 90 TABLET | Refills: 3 | Status: SHIPPED | OUTPATIENT
Start: 2018-09-17 | End: 2019-08-02 | Stop reason: SDUPTHER

## 2018-11-05 ENCOUNTER — CLINICAL SUPPORT (OUTPATIENT)
Dept: FAMILY MEDICINE CLINIC | Facility: CLINIC | Age: 68
End: 2018-11-05
Payer: MEDICARE

## 2018-11-05 DIAGNOSIS — Z23 NEED FOR INFLUENZA VACCINATION: Primary | ICD-10-CM

## 2018-11-05 PROCEDURE — 90686 IIV4 VACC NO PRSV 0.5 ML IM: CPT

## 2018-11-05 PROCEDURE — G0008 ADMIN INFLUENZA VIRUS VAC: HCPCS

## 2018-11-19 DIAGNOSIS — N40.0 BPH WITHOUT URINARY OBSTRUCTION: ICD-10-CM

## 2018-11-19 RX ORDER — TAMSULOSIN HYDROCHLORIDE 0.4 MG/1
0.4 CAPSULE ORAL
Qty: 90 CAPSULE | Refills: 0 | Status: SHIPPED | OUTPATIENT
Start: 2018-11-19 | End: 2019-03-31 | Stop reason: SDUPTHER

## 2019-01-05 DIAGNOSIS — I10 ESSENTIAL HYPERTENSION: ICD-10-CM

## 2019-01-07 RX ORDER — OLMESARTAN MEDOXOMIL 20 MG/1
TABLET ORAL
Qty: 90 TABLET | Refills: 3 | Status: SHIPPED | OUTPATIENT
Start: 2019-01-07 | End: 2019-06-05

## 2019-02-15 ENCOUNTER — TELEPHONE (OUTPATIENT)
Dept: CARDIOLOGY CLINIC | Facility: CLINIC | Age: 69
End: 2019-02-15

## 2019-02-15 NOTE — TELEPHONE ENCOUNTER
Patient saw you 2 years back has hipolito coming up in march would like to have labs done before he sees you to include glucose and cholesterol

## 2019-02-18 DIAGNOSIS — E78.5 DYSLIPIDEMIA: Primary | ICD-10-CM

## 2019-03-14 LAB
ALBUMIN SERPL-MCNC: 4.6 G/DL (ref 3.6–4.8)
ALBUMIN/GLOB SERPL: 1.8 {RATIO} (ref 1.2–2.2)
ALP SERPL-CCNC: 44 IU/L (ref 39–117)
ALT SERPL-CCNC: 21 IU/L (ref 0–44)
AST SERPL-CCNC: 28 IU/L (ref 0–40)
BILIRUB SERPL-MCNC: 0.7 MG/DL (ref 0–1.2)
BUN SERPL-MCNC: 15 MG/DL (ref 8–27)
BUN/CREAT SERPL: 16 (ref 10–24)
CALCIUM SERPL-MCNC: 9.3 MG/DL (ref 8.6–10.2)
CHLORIDE SERPL-SCNC: 101 MMOL/L (ref 96–106)
CHOLEST SERPL-MCNC: 124 MG/DL (ref 100–199)
CO2 SERPL-SCNC: 24 MMOL/L (ref 20–29)
CREAT SERPL-MCNC: 0.91 MG/DL (ref 0.76–1.27)
EST. AVERAGE GLUCOSE BLD GHB EST-MCNC: 126 MG/DL
GLOBULIN SER-MCNC: 2.5 G/DL (ref 1.5–4.5)
GLUCOSE SERPL-MCNC: 95 MG/DL (ref 65–99)
HBA1C MFR BLD: 6 % (ref 4.8–5.6)
HDLC SERPL-MCNC: 36 MG/DL
LABCORP COMMENT: NORMAL
LDLC SERPL CALC-MCNC: 57 MG/DL (ref 0–99)
POTASSIUM SERPL-SCNC: 5 MMOL/L (ref 3.5–5.2)
PROT SERPL-MCNC: 7.1 G/DL (ref 6–8.5)
SL AMB EGFR AFRICAN AMERICAN: 101 ML/MIN/1.73
SL AMB EGFR NON AFRICAN AMERICAN: 88 ML/MIN/1.73
SL AMB VLDL CHOLESTEROL CALC: 31 MG/DL (ref 5–40)
SODIUM SERPL-SCNC: 139 MMOL/L (ref 134–144)
TRIGL SERPL-MCNC: 155 MG/DL (ref 0–149)

## 2019-03-22 ENCOUNTER — OFFICE VISIT (OUTPATIENT)
Dept: CARDIOLOGY CLINIC | Facility: CLINIC | Age: 69
End: 2019-03-22
Payer: COMMERCIAL

## 2019-03-22 VITALS
SYSTOLIC BLOOD PRESSURE: 114 MMHG | WEIGHT: 215 LBS | DIASTOLIC BLOOD PRESSURE: 68 MMHG | BODY MASS INDEX: 35.82 KG/M2 | HEIGHT: 65 IN | OXYGEN SATURATION: 96 % | HEART RATE: 66 BPM

## 2019-03-22 DIAGNOSIS — R07.9 CHEST PAIN, UNSPECIFIED TYPE: Primary | ICD-10-CM

## 2019-03-22 DIAGNOSIS — I10 ESSENTIAL HYPERTENSION: ICD-10-CM

## 2019-03-22 DIAGNOSIS — G47.33 OSA (OBSTRUCTIVE SLEEP APNEA): ICD-10-CM

## 2019-03-22 DIAGNOSIS — Z95.5 S/P RIGHT CORONARY ARTERY (RCA) STENT PLACEMENT: ICD-10-CM

## 2019-03-22 DIAGNOSIS — I25.9 ISCHEMIC HEART DISEASE, CHRONIC: ICD-10-CM

## 2019-03-22 DIAGNOSIS — E78.5 DYSLIPIDEMIA: ICD-10-CM

## 2019-03-22 DIAGNOSIS — I25.10 CAD IN NATIVE ARTERY: ICD-10-CM

## 2019-03-22 PROBLEM — Z01.818 PREOP EXAMINATION: Status: RESOLVED | Noted: 2018-07-23 | Resolved: 2019-03-22

## 2019-03-22 PROCEDURE — 99214 OFFICE O/P EST MOD 30 MIN: CPT | Performed by: INTERNAL MEDICINE

## 2019-03-22 PROCEDURE — 93000 ELECTROCARDIOGRAM COMPLETE: CPT | Performed by: INTERNAL MEDICINE

## 2019-03-22 RX ORDER — ICOSAPENT ETHYL 1000 MG/1
2 CAPSULE ORAL 2 TIMES DAILY
Qty: 120 CAPSULE | Refills: 5 | Status: SHIPPED | OUTPATIENT
Start: 2019-03-22 | End: 2019-04-09 | Stop reason: SDUPTHER

## 2019-03-22 NOTE — PROGRESS NOTES
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 had pain afterwards  He denies dyspnea associated with the pain but has noticed dyspnea with exertion that has progressively worsened over the past several months  He snores and has woken up short of breath multiple times  He is fatigued during the day  He had dental surgery and was told he developed episodes of apnea and hypoxia during the procedure  He has a history of RCA stent in 2014  Prior to his PCI he had a burning sensation in his chest  He previously was on atorvastatin 80 mg daily which was reduced to 40 mg daily due to myalgias and elevated creatinine kinase  Reports good adherence to medications and remains on Zetia  Blood work was done on 3/13/19 showing a LDL of 57, TG of 155 and HDL of 36  The following portions of the patient's history were reviewed and updated as appropriate:   He  has a past medical history of Abnormal electrocardiogram, Chest pain, Coronary artery disease, Enthesopathy of elbow (02/15/2006), Epididymo-orchitis (07/10/2012), Hyperlipidemia, Hypertension, Obesity, and Voice disturbance (05/27/2008)  He  has a past surgical history that includes Coronary angioplasty with stent; Appendectomy; Coronary angioplasty; Coronary angioplasty with stent (2013); and pr revise median n/carpal tunnel surg (Left, 8/13/2018)  His family history is not on file  He  reports that he has never smoked  He has never used smokeless tobacco  He reports that he does not drink alcohol or use drugs    Current Outpatient Medications   Medication Sig Dispense Refill    acetaminophen (TYLENOL) 325 mg tablet Take 650 mg by mouth every 6 (six) hours as needed for mild pain  aspirin (ASPIRIN LOW DOSE) 81 MG tablet Take 1 tablet by mouth daily Last dose is 8/7/18       atorvastatin (LIPITOR) 40 mg tablet TAKE 1 TABLET DAILY AT  BEDTIME 90 tablet 2    Biotin 31436 MCG TABS Take 5,000 mg by mouth daily      Cholecalciferol (VITAMIN D3) 2000 units capsule Take 2,000 Units by mouth daily        Coenzyme Q10 (CO Q-10) 400 MG CAPS Take 1 capsule by mouth Daily      cycloSPORINE (RESTASIS) 0 05 % ophthalmic emulsion Administer to both eyes every 12 (twelve) hours        ezetimibe (ZETIA) 10 mg tablet TAKE 1 TABLET EVERY MORNING 90 tablet 2    Krill Oil 1000 MG CAPS Take 1 capsule by mouth daily      metoprolol succinate (TOPROL-XL) 25 mg 24 hr tablet TAKE 1 TABLET DAILY 90 tablet 3    Misc Natural Products (OSTEO BI-FLEX/5-LOXIN ADVANCED) TABS Take by mouth daily      Multiple Vitamins-Minerals (BIO-35 GLUTEN-FREE PO) Take 1 capsule by mouth daily      Naproxen Sodium (ALEVE) 220 MG CAPS Take by mouth      olmesartan (BENICAR) 20 mg tablet TAKE 1 TABLET BY MOUTH  DAILY 90 tablet 3    tamsulosin (FLOMAX) 0 4 mg Take 1 capsule (0 4 mg total) by mouth daily at bedtime 90 capsule 0    benzonatate (TESSALON) 200 MG capsule Take 1 capsule (200 mg total) by mouth 3 (three) times a day as needed for cough (Patient not taking: Reported on 3/22/2019) 20 capsule 0    Icosapent Ethyl (VASCEPA) 1 g CAPS Take 2 capsules (2 g total) by mouth 2 (two) times a day 120 capsule 5     No current facility-administered medications for this visit  He has No Known Allergies         Review of Systems:  Review of Systems   Constitutional: Positive for fatigue  Negative for chills and fever  HENT: Negative for congestion, nosebleeds and postnasal drip  Respiratory: Positive for shortness of breath  Negative for cough and chest tightness  Cardiovascular: Positive for chest pain  Negative for palpitations and leg swelling     Gastrointestinal: Negative for abdominal distention, abdominal pain, diarrhea, nausea and vomiting  Endocrine: Negative for polydipsia, polyphagia and polyuria  Musculoskeletal: Negative for gait problem and myalgias  Skin: Negative for color change, pallor and rash  Allergic/Immunologic: Negative for environmental allergies, food allergies and immunocompromised state  Neurological: Negative for dizziness, seizures, syncope and light-headedness  Hematological: Negative for adenopathy  Does not bruise/bleed easily  Psychiatric/Behavioral: Negative for dysphoric mood  The patient is not nervous/anxious  Physical Exam:  /68 (BP Location: Left arm, Patient Position: Sitting, Cuff Size: Large)   Pulse 66 Comment: apical  Ht 5' 5" (1 651 m)   Wt 97 5 kg (215 lb)   SpO2 96%   BMI 35 78 kg/m²     Physical Exam   Constitutional: He is oriented to person, place, and time  He appears well-developed  No distress  HENT:   Head: Normocephalic and atraumatic  Eyes: Pupils are equal, round, and reactive to light  Conjunctivae and EOM are normal    Neck: Neck supple  No JVD present  No thyromegaly present  Cardiovascular: Normal rate, regular rhythm, normal heart sounds and intact distal pulses  Exam reveals no gallop and no friction rub  No murmur heard  Pulmonary/Chest: Effort normal and breath sounds normal    Abdominal: Soft  He exhibits no distension  There is no tenderness  Musculoskeletal: He exhibits no edema  Neurological: He is alert and oriented to person, place, and time  No cranial nerve deficit  Skin: Skin is warm and dry  No rash noted  He is not diaphoretic  No erythema  Psychiatric: He has a normal mood and affect   His behavior is normal  Judgment and thought content normal        Cardiographics  ECG: NSR with LVH    Labs:  Lab Results   Component Value Date     05/12/2017    K 5 0 03/13/2019     03/13/2019    CO2 24 03/13/2019    BUN 15 03/13/2019    CREATININE 0 82 05/12/2017    GLUCOSE 94 05/12/2017    CALCIUM 8 8 05/12/2017     Lab Results   Component Value Date    WBC 7 2 07/13/2018    HGB 14 6 07/13/2018    HCT 43 5 07/13/2018    MCV 98 (H) 07/13/2018     07/13/2018     Lab Results   Component Value Date    CHOL 112 05/12/2017    TRIG 155 (H) 03/13/2019    HDL 36 (L) 03/13/2019     Imaging: No results found  Discussion/Summary:  1  Chest pain, unspecified type    2  CAD in native artery    3  Essential hypertension    4  Dyslipidemia    5  S/P right coronary artery (RCA) stent placement    6  KARL (obstructive sleep apnea)    7  Ischemic heart disease, chronic      - Rubens Leigh has exertional dyspnea  He has a history of CAD with previous PCI  Will obtain 2D echocardiogram and stress test to evaluate cardiac function   - Discussed recent cholesterol panel - TG were elevated  Recommend beginning Vascepa begin with 1000 mg bid and increase to 2000 mg bid  - Continue statin and Zetia  - Refer for sleep study to rule out KARL as he had hypoxia and apnea during recent dental procedure  - Continue current rx

## 2019-03-31 ENCOUNTER — TELEPHONE (OUTPATIENT)
Dept: FAMILY MEDICINE CLINIC | Facility: CLINIC | Age: 69
End: 2019-03-31

## 2019-03-31 DIAGNOSIS — E78.5 DYSLIPIDEMIA: ICD-10-CM

## 2019-03-31 DIAGNOSIS — N40.0 BPH WITHOUT URINARY OBSTRUCTION: ICD-10-CM

## 2019-04-01 RX ORDER — TAMSULOSIN HYDROCHLORIDE 0.4 MG/1
CAPSULE ORAL
Qty: 90 CAPSULE | Refills: 0 | Status: SHIPPED | OUTPATIENT
Start: 2019-04-01 | End: 2019-07-01 | Stop reason: SDUPTHER

## 2019-04-01 RX ORDER — EZETIMIBE 10 MG/1
TABLET ORAL
Qty: 90 TABLET | Refills: 2 | Status: SHIPPED | OUTPATIENT
Start: 2019-04-01 | End: 2020-02-19 | Stop reason: SDUPTHER

## 2019-04-09 ENCOUNTER — TELEPHONE (OUTPATIENT)
Dept: CARDIOLOGY CLINIC | Facility: CLINIC | Age: 69
End: 2019-04-09

## 2019-04-09 DIAGNOSIS — E78.5 DYSLIPIDEMIA: ICD-10-CM

## 2019-04-10 RX ORDER — ICOSAPENT ETHYL 1000 MG/1
2 CAPSULE ORAL 2 TIMES DAILY
Qty: 360 CAPSULE | Refills: 3 | Status: SHIPPED | OUTPATIENT
Start: 2019-04-10 | End: 2020-02-24

## 2019-04-18 ENCOUNTER — TELEPHONE (OUTPATIENT)
Dept: CARDIOLOGY CLINIC | Facility: CLINIC | Age: 69
End: 2019-04-18

## 2019-04-18 DIAGNOSIS — I10 ESSENTIAL HYPERTENSION: Primary | ICD-10-CM

## 2019-04-22 RX ORDER — LISINOPRIL 20 MG/1
20 TABLET ORAL DAILY
Qty: 30 TABLET | Refills: 1 | Status: SHIPPED | OUTPATIENT
Start: 2019-04-22 | End: 2019-05-01 | Stop reason: SDUPTHER

## 2019-04-23 ENCOUNTER — HOSPITAL ENCOUNTER (OUTPATIENT)
Dept: NON INVASIVE DIAGNOSTICS | Facility: HOSPITAL | Age: 69
Discharge: HOME | End: 2019-04-23
Attending: INTERNAL MEDICINE
Payer: COMMERCIAL

## 2019-04-23 DIAGNOSIS — I25.9 ISCHEMIC HEART DISEASE, CHRONIC: ICD-10-CM

## 2019-04-23 PROCEDURE — 93017 CV STRESS TEST TRACING ONLY: CPT

## 2019-04-23 PROCEDURE — 93306 TTE W/DOPPLER COMPLETE: CPT

## 2019-04-24 LAB
CHEST PAIN STATEMENT: NORMAL
MAX DIASTOLIC BP: 76 MMHG
MAX HEART RATE: 134 BPM
MAX PREDICTED HEART RATE: 154 BPM
MAX. SYSTOLIC BP: 170 MMHG
PROTOCOL NAME: NORMAL
REASON FOR TERMINATION: NORMAL
TARGET HR FORMULA: NORMAL
TIME IN EXERCISE PHASE: NORMAL

## 2019-04-24 PROCEDURE — 93018 CV STRESS TEST I&R ONLY: CPT | Performed by: INTERNAL MEDICINE

## 2019-04-24 PROCEDURE — 93016 CV STRESS TEST SUPVJ ONLY: CPT | Performed by: INTERNAL MEDICINE

## 2019-04-25 ENCOUNTER — TELEPHONE (OUTPATIENT)
Dept: CARDIOLOGY CLINIC | Facility: CLINIC | Age: 69
End: 2019-04-25

## 2019-04-25 PROCEDURE — 93306 TTE W/DOPPLER COMPLETE: CPT | Performed by: INTERNAL MEDICINE

## 2019-05-01 ENCOUNTER — TELEPHONE (OUTPATIENT)
Dept: CARDIOLOGY CLINIC | Facility: CLINIC | Age: 69
End: 2019-05-01

## 2019-05-01 DIAGNOSIS — I10 ESSENTIAL HYPERTENSION: ICD-10-CM

## 2019-05-01 RX ORDER — LISINOPRIL 20 MG/1
20 TABLET ORAL DAILY
Qty: 90 TABLET | Refills: 3 | Status: SHIPPED | OUTPATIENT
Start: 2019-05-01 | End: 2020-02-15

## 2019-06-02 DIAGNOSIS — E78.5 DYSLIPIDEMIA: ICD-10-CM

## 2019-06-02 RX ORDER — ATORVASTATIN CALCIUM 40 MG/1
TABLET, FILM COATED ORAL
Qty: 90 TABLET | Refills: 2 | Status: SHIPPED | OUTPATIENT
Start: 2019-06-02 | End: 2020-02-19 | Stop reason: SDUPTHER

## 2019-06-05 ENCOUNTER — OFFICE VISIT (OUTPATIENT)
Dept: FAMILY MEDICINE CLINIC | Facility: CLINIC | Age: 69
End: 2019-06-05
Payer: COMMERCIAL

## 2019-06-05 VITALS
HEART RATE: 72 BPM | BODY MASS INDEX: 35.65 KG/M2 | DIASTOLIC BLOOD PRESSURE: 70 MMHG | SYSTOLIC BLOOD PRESSURE: 140 MMHG | WEIGHT: 214 LBS | HEIGHT: 65 IN | TEMPERATURE: 97.8 F | RESPIRATION RATE: 18 BRPM

## 2019-06-05 DIAGNOSIS — E66.9 OBESITY (BMI 35.0-39.9 WITHOUT COMORBIDITY): ICD-10-CM

## 2019-06-05 DIAGNOSIS — Z00.00 ROUTINE MEDICAL EXAM: ICD-10-CM

## 2019-06-05 DIAGNOSIS — E78.5 DYSLIPIDEMIA: ICD-10-CM

## 2019-06-05 DIAGNOSIS — I10 ESSENTIAL HYPERTENSION: Primary | ICD-10-CM

## 2019-06-05 DIAGNOSIS — Z23 NEED FOR PNEUMOCOCCAL VACCINATION: ICD-10-CM

## 2019-06-05 DIAGNOSIS — Z12.11 ENCOUNTER FOR SCREENING FOR MALIGNANT NEOPLASM OF COLON: ICD-10-CM

## 2019-06-05 DIAGNOSIS — R73.01 IMPAIRED FASTING BLOOD SUGAR: ICD-10-CM

## 2019-06-05 DIAGNOSIS — I25.9 ISCHEMIC HEART DISEASE, CHRONIC: ICD-10-CM

## 2019-06-05 DIAGNOSIS — N40.0 ENLARGED PROSTATE WITHOUT LOWER URINARY TRACT SYMPTOMS (LUTS): ICD-10-CM

## 2019-06-05 PROBLEM — G56.02 CARPAL TUNNEL SYNDROME ON LEFT: Status: RESOLVED | Noted: 2018-06-26 | Resolved: 2019-06-05

## 2019-06-05 PROCEDURE — G0438 PPPS, INITIAL VISIT: HCPCS | Performed by: FAMILY MEDICINE

## 2019-06-05 PROCEDURE — 1036F TOBACCO NON-USER: CPT | Performed by: FAMILY MEDICINE

## 2019-06-05 PROCEDURE — 4040F PNEUMOC VAC/ADMIN/RCVD: CPT

## 2019-06-05 PROCEDURE — 90670 PCV13 VACCINE IM: CPT

## 2019-06-05 PROCEDURE — 1101F PT FALLS ASSESS-DOCD LE1/YR: CPT | Performed by: FAMILY MEDICINE

## 2019-06-05 PROCEDURE — 3008F BODY MASS INDEX DOCD: CPT | Performed by: FAMILY MEDICINE

## 2019-06-05 PROCEDURE — 3725F SCREEN DEPRESSION PERFORMED: CPT | Performed by: FAMILY MEDICINE

## 2019-06-05 PROCEDURE — 99214 OFFICE O/P EST MOD 30 MIN: CPT | Performed by: FAMILY MEDICINE

## 2019-06-05 PROCEDURE — G0009 ADMIN PNEUMOCOCCAL VACCINE: HCPCS

## 2019-06-05 PROCEDURE — 1160F RVW MEDS BY RX/DR IN RCRD: CPT | Performed by: FAMILY MEDICINE

## 2019-06-05 PROCEDURE — 1125F AMNT PAIN NOTED PAIN PRSNT: CPT | Performed by: FAMILY MEDICINE

## 2019-06-05 PROCEDURE — 1170F FXNL STATUS ASSESSED: CPT | Performed by: FAMILY MEDICINE

## 2019-07-01 DIAGNOSIS — N40.0 BPH WITHOUT URINARY OBSTRUCTION: ICD-10-CM

## 2019-07-01 RX ORDER — TAMSULOSIN HYDROCHLORIDE 0.4 MG/1
CAPSULE ORAL
Qty: 90 CAPSULE | Refills: 1 | Status: SHIPPED | OUTPATIENT
Start: 2019-07-01 | End: 2020-01-12

## 2019-07-23 ENCOUNTER — TELEPHONE (OUTPATIENT)
Dept: CARDIOLOGY CLINIC | Facility: CLINIC | Age: 69
End: 2019-07-23

## 2019-07-24 DIAGNOSIS — E66.9 OBESITY (BMI 35.0-39.9 WITHOUT COMORBIDITY): ICD-10-CM

## 2019-07-24 DIAGNOSIS — I25.10 CAD IN NATIVE ARTERY: Primary | ICD-10-CM

## 2019-08-02 DIAGNOSIS — I25.10 CORONARY ARTERY DISEASE INVOLVING NATIVE CORONARY ARTERY OF NATIVE HEART WITHOUT ANGINA PECTORIS: ICD-10-CM

## 2019-08-05 ENCOUNTER — OFFICE VISIT (OUTPATIENT)
Dept: OBGYN CLINIC | Facility: CLINIC | Age: 69
End: 2019-08-05
Payer: COMMERCIAL

## 2019-08-05 VITALS
BODY MASS INDEX: 34.39 KG/M2 | SYSTOLIC BLOOD PRESSURE: 127 MMHG | HEIGHT: 66 IN | WEIGHT: 214 LBS | DIASTOLIC BLOOD PRESSURE: 73 MMHG | HEART RATE: 76 BPM

## 2019-08-05 DIAGNOSIS — G56.01 CARPAL TUNNEL SYNDROME ON RIGHT: Primary | ICD-10-CM

## 2019-08-05 PROCEDURE — 99214 OFFICE O/P EST MOD 30 MIN: CPT | Performed by: ORTHOPAEDIC SURGERY

## 2019-08-05 NOTE — PROGRESS NOTES
Assessment/Plan:  1  Carpal tunnel syndrome on right         Scribe Attestation    I,:   Angela Kamara am acting as a scribe while in the presence of the attending physician :        I,:   Maxine Rea MD personally performed the services described in this documentation    as scribed in my presence :              Almita Geronimo upon examination today does demonstrate signs and symptoms consistent with right carpal tunnel syndrome and he does have a positive Tinel's sign, and positive Durkan's test   He does also report to have an EMG completed 6 years ago that confirmed carpal tunnel syndrome  I did offer Almita Geronimo to perform a right hand carpal tunnel release procedure  I discussed the procedure with him as well as the risks including but not limited to bleeding, infection, nerve injury resulting in weakness and pain, blood clots, weakness, increased pain, loss range of motion, delayed wound healing, recurrent symptoms, and need for further surgery  Almita Geronimo understood all the risks associated with procedure and wished to proceed forward by providing signed consent for a right hand carpal tunnel release procedure  He will meet with my surgical scheduler prior to leaving my office today  I would like Almita Geronimo to have cardiac clearance prior to his surgery  I will see Almita Geronimo back on the date of his surgery  Subjective:   Edin Dominguez is a 79 y o  male who presents to the office today for follow-up evaluation of his right wrist   He states that he has been experiencing intermittent and mild to moderate numbness and tingling into the thumb, index, and long finger  He does note that the symptoms can radiate proximally into the forearm  He states that his symptoms are most pronounced if his hand is immobilized for a period of time  He states that he has used his wrist brace at night, and these have helped however he notes that he does have some 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    He did have a left wrist carpal tunnel release last year and notes that he has had significant improvement in his symptoms  He does report that he has an EMG completed approximately 6 years ago that did confirm carpal tunnel syndrome into the right upper extremity  Today he denies any distal paresthesias  Review of Systems   Constitutional: Negative for chills, fever and unexpected weight change  HENT: Negative for hearing loss, nosebleeds and sore throat  Eyes: Negative for pain, redness and visual disturbance  Respiratory: Negative for cough, shortness of breath and wheezing  Cardiovascular: Negative for chest pain, palpitations and leg swelling  Gastrointestinal: Negative for abdominal pain, nausea and vomiting  Endocrine: Negative for polyphagia and polyuria  Genitourinary: Negative for dysuria and hematuria  Musculoskeletal: Positive for myalgias  See HPI   Skin: Negative for rash and wound  Neurological: Positive for numbness (Right hand)  Negative for dizziness and headaches  Psychiatric/Behavioral: Negative for decreased concentration and suicidal ideas  The patient is not nervous/anxious            Past Medical History:   Diagnosis Date    Abnormal electrocardiogram     last assessed 12/19/13    Chest pain     Coronary artery disease     Enthesopathy of elbow 02/15/2006    Epididymo-orchitis 07/10/2012    Hyperlipidemia     Hypertension     Obesity     Voice disturbance 05/27/2008       Past Surgical History:   Procedure Laterality Date    APPENDECTOMY      CORONARY ANGIOPLASTY      CORONARY ANGIOPLASTY WITH STENT PLACEMENT      CORONARY ANGIOPLASTY WITH STENT PLACEMENT  2013    VA REVISE MEDIAN N/CARPAL TUNNEL SURG Left 8/13/2018    Procedure: RELEASE CARPAL TUNNEL;  Surgeon: Maxine Rea MD;  Location: [facility];  Service: Orthopedics       Family History   Problem Relation Age of Onset    No Known Problems Father     No Known Problems Sister     No Known Problems Brother     No Known Problems Maternal Aunt     No Known Problems Maternal Uncle     No Known Problems Paternal Aunt     No Known Problems Paternal Uncle     No Known Problems Maternal Grandmother     No Known Problems Maternal Grandfather     No Known Problems Paternal Grandmother     No Known Problems Paternal Grandfather        Social History     Occupational History    Not on file   Tobacco Use    Smoking status: Never Smoker    Smokeless tobacco: Never Used   Substance and Sexual Activity    Alcohol use: No    Drug use: No    Sexual activity: Not on file         Current Outpatient Medications:     aspirin (ASPIRIN LOW DOSE) 81 MG tablet, Take 1 tablet by mouth daily Last dose is 8/7/18 , Disp: , Rfl:     atorvastatin (LIPITOR) 40 mg tablet, TAKE 1 TABLET DAILY AT  BEDTIME, Disp: 90 tablet, Rfl: 2    Biotin 39252 MCG TABS, Take 5,000 mg by mouth daily, Disp: , Rfl:     Cholecalciferol (VITAMIN D3) 2000 units capsule, Take 2,000 Units by mouth daily  , Disp: , Rfl:     Coenzyme Q10 (CO Q-10) 400 MG CAPS, Take 1 capsule by mouth Daily, Disp: , Rfl:     cycloSPORINE (RESTASIS) 0 05 % ophthalmic emulsion, Administer to both eyes every 12 (twelve) hours  , Disp: , Rfl:     ezetimibe (ZETIA) 10 mg tablet, TAKE 1 TABLET BY MOUTH  EVERY MORNING, Disp: 90 tablet, Rfl: 2    Icosapent Ethyl (VASCEPA) 1 g CAPS, Take 2 capsules (2 g total) by mouth 2 (two) times a day, Disp: 360 capsule, Rfl: 3    Krill Oil 1000 MG CAPS, Take 1 capsule by mouth daily, Disp: , Rfl:     lisinopril (ZESTRIL) 20 mg tablet, Take 1 tablet (20 mg total) by mouth daily, Disp: 90 tablet, Rfl: 3    metoprolol succinate (TOPROL-XL) 25 mg 24 hr tablet, TAKE 1 TABLET DAILY, Disp: 90 tablet, Rfl: 3    Misc Natural Products (OSTEO BI-FLEX/5-LOXIN ADVANCED) TABS, Take by mouth daily, Disp: , Rfl:     Multiple Vitamins-Minerals (BIO-35 GLUTEN-FREE PO), Take 1 capsule by mouth daily, Disp: , Rfl:     Naproxen Sodium (ALEVE) 220 MG CAPS, Take by mouth, Disp: , Rfl:     tamsulosin (FLOMAX) 0 4 mg, TAKE 1 CAPSULE BY MOUTH  DAILY AT BEDTIME, Disp: 90 capsule, Rfl: 1    No Known Allergies    Objective:  Vitals:    08/05/19 0804   BP: 127/73   Pulse: 76       Right Hand Exam     Tenderness   The patient is experiencing no tenderness  Range of Motion   Wrist   Extension: 50   Flexion: 90   Pronation: 90   Supination: 90     Muscle Strength   Right wrist normal muscle strength: APB:  5/5 FPL: 5/5  Wrist extension: 5/5   Wrist flexion: 5/5   : 5/5     Tests   Tinel's sign (median nerve): positive    Other   Erythema: absent  Scars: absent  Sensation: normal  Pulse: present    Comments:  Durkan's test:  Positive          Strength/Myotome Testing     Right Wrist/Hand   Right wrist normal muscle strength: APB:  5/5 FPL: 5/5  Wrist extension: 5  Wrist flexion: 5    Tests     Right Wrist/Hand   Positive Tinel's sign (medial nerve)  Physical Exam   Constitutional: He is oriented to person, place, and time  He appears well-developed and well-nourished  HENT:   Head: Normocephalic and atraumatic  Eyes: Conjunctivae are normal  Right eye exhibits no discharge  Left eye exhibits no discharge  Neck: Normal range of motion  Neck supple  Cardiovascular: Normal rate, normal heart sounds and intact distal pulses  Pulmonary/Chest: Effort normal and breath sounds normal  No respiratory distress  Neurological: He is alert and oriented to person, place, and time  Skin: Skin is warm and dry  Psychiatric: He has a normal mood and affect  His behavior is normal  Judgment and thought content normal    Vitals reviewed

## 2019-08-06 RX ORDER — METOPROLOL SUCCINATE 25 MG/1
TABLET, EXTENDED RELEASE ORAL
Qty: 90 TABLET | Refills: 3 | Status: SHIPPED | OUTPATIENT
Start: 2019-08-06 | End: 2020-08-12

## 2019-08-12 ENCOUNTER — TELEPHONE (OUTPATIENT)
Dept: CARDIOLOGY CLINIC | Facility: CLINIC | Age: 69
End: 2019-08-12

## 2019-08-12 DIAGNOSIS — I25.10 CAD IN NATIVE ARTERY: Primary | ICD-10-CM

## 2019-08-12 NOTE — TELEPHONE ENCOUNTER
PT was started on Vascepa wants to know if he needs a Lipid Panel before his next visit, if so can an order be placed in Epic

## 2019-08-16 LAB
ALBUMIN SERPL-MCNC: 4.3 G/DL (ref 3.6–4.8)
ALBUMIN/GLOB SERPL: 1.6 {RATIO} (ref 1.2–2.2)
ALP SERPL-CCNC: 43 IU/L (ref 39–117)
ALT SERPL-CCNC: 20 IU/L (ref 0–44)
AST SERPL-CCNC: 23 IU/L (ref 0–40)
BILIRUB SERPL-MCNC: 0.6 MG/DL (ref 0–1.2)
BUN SERPL-MCNC: 14 MG/DL (ref 8–27)
BUN/CREAT SERPL: 16 (ref 10–24)
CALCIUM SERPL-MCNC: 9.4 MG/DL (ref 8.6–10.2)
CHLORIDE SERPL-SCNC: 102 MMOL/L (ref 96–106)
CHOLEST SERPL-MCNC: 122 MG/DL (ref 100–199)
CO2 SERPL-SCNC: 25 MMOL/L (ref 20–29)
CREAT SERPL-MCNC: 0.85 MG/DL (ref 0.76–1.27)
EST. AVERAGE GLUCOSE BLD GHB EST-MCNC: 123 MG/DL
GLOBULIN SER-MCNC: 2.7 G/DL (ref 1.5–4.5)
GLUCOSE SERPL-MCNC: 102 MG/DL (ref 65–99)
HBA1C MFR BLD: 5.9 % (ref 4.8–5.6)
HDLC SERPL-MCNC: 37 MG/DL
LDLC SERPL CALC-MCNC: 63 MG/DL (ref 0–99)
POTASSIUM SERPL-SCNC: 5.1 MMOL/L (ref 3.5–5.2)
PROT SERPL-MCNC: 7 G/DL (ref 6–8.5)
SL AMB EGFR AFRICAN AMERICAN: 105 ML/MIN/1.73
SL AMB EGFR NON AFRICAN AMERICAN: 91 ML/MIN/1.73
SL AMB VLDL CHOLESTEROL CALC: 22 MG/DL (ref 5–40)
SODIUM SERPL-SCNC: 142 MMOL/L (ref 134–144)
TRIGL SERPL-MCNC: 109 MG/DL (ref 0–149)

## 2019-08-21 ENCOUNTER — OFFICE VISIT (OUTPATIENT)
Dept: CARDIOLOGY CLINIC | Facility: CLINIC | Age: 69
End: 2019-08-21
Payer: COMMERCIAL

## 2019-08-21 VITALS
OXYGEN SATURATION: 96 % | DIASTOLIC BLOOD PRESSURE: 72 MMHG | WEIGHT: 213 LBS | BODY MASS INDEX: 34.23 KG/M2 | SYSTOLIC BLOOD PRESSURE: 130 MMHG | HEART RATE: 76 BPM | HEIGHT: 66 IN

## 2019-08-21 DIAGNOSIS — I25.9 ISCHEMIC HEART DISEASE, CHRONIC: ICD-10-CM

## 2019-08-21 DIAGNOSIS — G56.01 CARPAL TUNNEL SYNDROME ON RIGHT: ICD-10-CM

## 2019-08-21 DIAGNOSIS — Z95.5 S/P RIGHT CORONARY ARTERY (RCA) STENT PLACEMENT: ICD-10-CM

## 2019-08-21 DIAGNOSIS — Z01.818 PRE-OP EXAM: Primary | ICD-10-CM

## 2019-08-21 DIAGNOSIS — E78.5 DYSLIPIDEMIA: ICD-10-CM

## 2019-08-21 DIAGNOSIS — I10 ESSENTIAL HYPERTENSION: ICD-10-CM

## 2019-08-21 PROCEDURE — 3075F SYST BP GE 130 - 139MM HG: CPT | Performed by: INTERNAL MEDICINE

## 2019-08-21 PROCEDURE — 99205 OFFICE O/P NEW HI 60 MIN: CPT | Performed by: INTERNAL MEDICINE

## 2019-08-21 PROCEDURE — 93000 ELECTROCARDIOGRAM COMPLETE: CPT | Performed by: INTERNAL MEDICINE

## 2019-08-21 NOTE — PROGRESS NOTES
Subjective:     Babak Perkins is a 79 y o  male  who presents to the office today for a preoperative consultation at the request of surgeon Dr Cullen Drummond who plans on performing right carpal tunnel release on September 19  Planned anesthesia is IV sedation  The patient has no known anesthesia issues  Most recent surgery was left carpal tunnel release in August 2018      he is able to ambulate 4 blocks on level ground or 2 flights of stairs without stopping   (>4 METs)  Denies any shortness of breath and chest pain  Stress test earlier this year was normal      The following portions of the patient's history were reviewed and updated as appropriate:   He  has a past medical history of Abnormal electrocardiogram, Chest pain, Coronary artery disease, Enthesopathy of elbow (02/15/2006), Epididymo-orchitis (07/10/2012), Hyperlipidemia, Hypertension, Obesity, and Voice disturbance (05/27/2008)  He  has a past surgical history that includes Coronary angioplasty with stent; Appendectomy; Coronary angioplasty; Coronary angioplasty with stent (2013); and pr revise median n/carpal tunnel surg (Left, 8/13/2018)  His family history includes No Known Problems in his brother, father, maternal aunt, maternal grandfather, maternal grandmother, maternal uncle, paternal aunt, paternal grandfather, paternal grandmother, paternal uncle, and sister  He  reports that he has never smoked  He has never used smokeless tobacco  He reports that he does not drink alcohol or use drugs    Current Outpatient Medications   Medication Sig Dispense Refill    aspirin (ASPIRIN LOW DOSE) 81 MG tablet Take 1 tablet by mouth daily Last dose is 8/7/18       atorvastatin (LIPITOR) 40 mg tablet TAKE 1 TABLET DAILY AT  BEDTIME 90 tablet 2    Biotin 09701 MCG TABS Take 5,000 mg by mouth daily      Cholecalciferol (VITAMIN D3) 2000 units capsule Take 2,000 Units by mouth daily        cycloSPORINE (RESTASIS) 0 05 % ophthalmic emulsion Administer to both eyes every 12 (twelve) hours        ezetimibe (ZETIA) 10 mg tablet TAKE 1 TABLET BY MOUTH  EVERY MORNING 90 tablet 2    Icosapent Ethyl (VASCEPA) 1 g CAPS Take 2 capsules (2 g total) by mouth 2 (two) times a day 360 capsule 3    Krill Oil 1000 MG CAPS Take 1 capsule by mouth daily      lisinopril (ZESTRIL) 20 mg tablet Take 1 tablet (20 mg total) by mouth daily 90 tablet 3    metoprolol succinate (TOPROL-XL) 25 mg 24 hr tablet TAKE 1 TABLET DAILY 90 tablet 3    Misc Natural Products (OSTEO BI-FLEX/5-LOXIN ADVANCED) TABS Take by mouth daily      tamsulosin (FLOMAX) 0 4 mg TAKE 1 CAPSULE BY MOUTH  DAILY AT BEDTIME 90 capsule 1    Coenzyme Q10 (CO Q-10) 400 MG CAPS Take 1 capsule by mouth Daily      Multiple Vitamins-Minerals (BIO-35 GLUTEN-FREE PO) Take 1 capsule by mouth daily      Naproxen Sodium (ALEVE) 220 MG CAPS Take by mouth       No current facility-administered medications for this visit  He has No Known Allergies       Review of Systems  Review of Systems       Objective:      Physical Exam  /72 (BP Location: Left arm, Patient Position: Sitting, Cuff Size: Standard)   Pulse 76 Comment: apical  Ht 5' 6" (1 676 m)   Wt 96 6 kg (213 lb)   SpO2 96% Comment: RA  BMI 34 38 kg/m²    Physical Exam   Constitutional: He appears healthy  No distress  HENT:   Nose: Nose normal    Mouth/Throat: Dentition is normal  Oropharynx is clear  Eyes: Pupils are equal, round, and reactive to light  Conjunctivae are normal    Neck: Normal range of motion  Neck supple  No JVD present  Cardiovascular: Normal rate, regular rhythm and normal heart sounds  Exam reveals no gallop and no friction rub  No murmur heard  Pulmonary/Chest: Effort normal and breath sounds normal  He has no wheezes  He has no rales  Abdominal: Soft  He exhibits no distension  There is no tenderness  Musculoskeletal: He exhibits no edema  Neurological: He is alert and oriented to person, place, and time  Skin: Skin is warm and dry  Cardiographics  ECG: normal sinus rhythm, no blocks or conduction defects, no ischemic changes  Echocardiogram: normal and reviewed by myself    Lab Review   Lab Results   Component Value Date     05/12/2017    K 5 1 08/15/2019     08/15/2019    CO2 25 08/15/2019    BUN 14 08/15/2019    CREATININE 0 85 08/15/2019    CREATININE 0 82 05/12/2017    GLUCOSE 94 05/12/2017    CALCIUM 8 8 05/12/2017     Lab Results   Component Value Date    WBC 7 2 07/13/2018    HGB 14 6 07/13/2018    HCT 43 5 07/13/2018    MCV 98 (H) 07/13/2018     07/13/2018     Lab Results   Component Value Date    CHOL 112 05/12/2017    TRIG 109 08/15/2019    HDL 37 (L) 08/15/2019          Assessment:     1  Pre-op exam    2  Carpal tunnel syndrome on right    3  Essential hypertension    4  Ischemic heart disease, chronic    5  S/P right coronary artery (RCA) stent placement    6  Dyslipidemia           60 y o  male  with planned surgery as above  Known risk factors for perioperative complications: Coronary disease        Cardiac Risk Estimation: per the Revised Cardiac Risk Index, the patient has a score of 1 placing him at low-intermediate risk of major cardiac event (6%) and may proceed to OR as planned  No further cardiac workup is indicated at this time  Plan:      1  Preoperative workup as follows none  2  Change in medication regimen before surgery: Hold Vascepa and ASA prior to procedure if needed  3  Prophylaxis for cardiac events with perioperative beta-blockers: Patient is currently on beta blocker therapy, which he should continue on morning of procedure  4  Invasive hemodynamic monitoring perioperatively: at the discretion of anesthesiologist   5  Deep vein thrombosis prophylaxis postoperatively:regimen to be chosen by surgical team   6 Other measures: Reviewed recent blood work with patient  Lipid profile significantly improved including TG

## 2019-08-21 NOTE — PROGRESS NOTES
Cardiology Follow Up    Bolivar Martins  1950  [id number]      Interval History: Bolivar Martins is here for follow up of ***    {Common ambulatory SmartLinks:82277}    Current Outpatient Medications on File Prior to Visit   Medication Sig Dispense Refill    aspirin (ASPIRIN LOW DOSE) 81 MG tablet Take 1 tablet by mouth daily Last dose is 18       atorvastatin (LIPITOR) 40 mg tablet TAKE 1 TABLET DAILY AT  BEDTIME 90 tablet 2    Biotin 51074 MCG TABS Take 5,000 mg by mouth daily      Cholecalciferol (VITAMIN D3) 2000 units capsule Take 2,000 Units by mouth daily        cycloSPORINE (RESTASIS) 0 05 % ophthalmic emulsion Administer to both eyes every 12 (twelve) hours        ezetimibe (ZETIA) 10 mg tablet TAKE 1 TABLET BY MOUTH  EVERY MORNING 90 tablet 2    Icosapent Ethyl (VASCEPA) 1 g CAPS Take 2 capsules (2 g total) by mouth 2 (two) times a day 360 capsule 3    Krill Oil 1000 MG CAPS Take 1 capsule by mouth daily      lisinopril (ZESTRIL) 20 mg tablet Take 1 tablet (20 mg total) by mouth daily 90 tablet 3    metoprolol succinate (TOPROL-XL) 25 mg 24 hr tablet TAKE 1 TABLET DAILY 90 tablet 3    Misc Natural Products (OSTEO BI-FLEX/5-LOXIN ADVANCED) TABS Take by mouth daily      tamsulosin (FLOMAX) 0 4 mg TAKE 1 CAPSULE BY MOUTH  DAILY AT BEDTIME 90 capsule 1    Coenzyme Q10 (CO Q-10) 400 MG CAPS Take 1 capsule by mouth Daily      Multiple Vitamins-Minerals (BIO-35 GLUTEN-FREE PO) Take 1 capsule by mouth daily      Naproxen Sodium (ALEVE) 220 MG CAPS Take by mouth       No current facility-administered medications on file prior to visit  Review of Systems:  Review of Systems    Physical Exam:  Physical Exam    Cardiographics  ECG: {findings; diagnostics ecg readin}    Labs:{Recent YIPM:71172::"GVL applicable"}  Imaging: No results found  Discussion/Summary:  1  Pre-op exam    2  Carpal tunnel syndrome on right    3  Essential hypertension    4  Ischemic heart disease, chronic    5  S/P right coronary artery (RCA) stent placement    6  Dyslipidemia        ***    Prevention:  Last lipid panel:   Results from last 7 days   Lab Units 08/15/19  0850   TRIGLYCERIDES mg/dL 109   HDL mg/dL 37*       Current BP: /72 (BP Location: Left arm, Patient Position: Sitting, Cuff Size: Standard)   Pulse 76 Comment: apical  Ht 5' 6" (1 676 m)   Wt 96 6 kg (213 lb)   SpO2 96% Comment: RA  BMI 34 38 kg/m²      Weight: Current BMI Body mass index is 34 38 kg/m²       Smoking: Counseling given: Not Answered

## 2019-08-26 ENCOUNTER — TELEPHONE (OUTPATIENT)
Dept: OBGYN CLINIC | Facility: HOSPITAL | Age: 69
End: 2019-08-26

## 2019-08-26 NOTE — TELEPHONE ENCOUNTER
Patient has surgery scheduled with Dr Shaina Esquivel for 09/19  He is on baby aspirin and his wife is asking when he would have to stop taking it?

## 2019-08-26 NOTE — TELEPHONE ENCOUNTER
I called and spoke with Mrs Giovanni Wong  I explained that we want her to stop ASA 1 week prior  I suggested that she check with Dr Wilner Alva to make sure that he is okay with 1 week  He did provide a clearance, however, did not note if allowed to stop x 1 week

## 2019-08-30 ENCOUNTER — APPOINTMENT (OUTPATIENT)
Dept: LAB | Facility: HOSPITAL | Age: 69
End: 2019-08-30
Attending: ORTHOPAEDIC SURGERY
Payer: COMMERCIAL

## 2019-08-30 ENCOUNTER — TRANSCRIBE ORDERS (OUTPATIENT)
Dept: ADMINISTRATIVE | Facility: HOSPITAL | Age: 69
End: 2019-08-30

## 2019-08-30 DIAGNOSIS — G56.01 CARPAL TUNNEL SYNDROME ON RIGHT: ICD-10-CM

## 2019-08-30 LAB
ANION GAP SERPL CALCULATED.3IONS-SCNC: 6 MMOL/L (ref 4–13)
APTT PPP: 26 SECONDS (ref 25–32)
BASOPHILS # BLD AUTO: 0.05 THOUSANDS/ΜL (ref 0–0.1)
BASOPHILS NFR BLD AUTO: 1 % (ref 0–1)
BUN SERPL-MCNC: 14 MG/DL (ref 5–25)
CALCIUM SERPL-MCNC: 8.5 MG/DL (ref 8.3–10.1)
CHLORIDE SERPL-SCNC: 103 MMOL/L (ref 100–108)
CO2 SERPL-SCNC: 29 MMOL/L (ref 21–32)
CREAT SERPL-MCNC: 0.81 MG/DL (ref 0.6–1.3)
EOSINOPHIL # BLD AUTO: 0.15 THOUSAND/ΜL (ref 0–0.61)
EOSINOPHIL NFR BLD AUTO: 2 % (ref 0–6)
ERYTHROCYTE [DISTWIDTH] IN BLOOD BY AUTOMATED COUNT: 12.4 % (ref 11.6–15.1)
GFR SERPL CREATININE-BSD FRML MDRD: 93 ML/MIN/1.73SQ M
GLUCOSE P FAST SERPL-MCNC: 103 MG/DL (ref 65–99)
HCT VFR BLD AUTO: 43.1 % (ref 36.5–49.3)
HGB BLD-MCNC: 14.1 G/DL (ref 12–17)
IMM GRANULOCYTES # BLD AUTO: 0.02 THOUSAND/UL (ref 0–0.2)
IMM GRANULOCYTES NFR BLD AUTO: 0 % (ref 0–2)
INR PPP: 0.98 (ref 0.91–1.09)
LYMPHOCYTES # BLD AUTO: 2.71 THOUSANDS/ΜL (ref 0.6–4.47)
LYMPHOCYTES NFR BLD AUTO: 39 % (ref 14–44)
MCH RBC QN AUTO: 32.5 PG (ref 26.8–34.3)
MCHC RBC AUTO-ENTMCNC: 32.7 G/DL (ref 31.4–37.4)
MCV RBC AUTO: 99 FL (ref 82–98)
MONOCYTES # BLD AUTO: 0.71 THOUSAND/ΜL (ref 0.17–1.22)
MONOCYTES NFR BLD AUTO: 10 % (ref 4–12)
NEUTROPHILS # BLD AUTO: 3.27 THOUSANDS/ΜL (ref 1.85–7.62)
NEUTS SEG NFR BLD AUTO: 48 % (ref 43–75)
NRBC BLD AUTO-RTO: 0 /100 WBCS
PLATELET # BLD AUTO: 183 THOUSANDS/UL (ref 149–390)
PMV BLD AUTO: 9.7 FL (ref 8.9–12.7)
POTASSIUM SERPL-SCNC: 4.1 MMOL/L (ref 3.5–5.3)
PROTHROMBIN TIME: 10.5 SECONDS (ref 9.8–12)
RBC # BLD AUTO: 4.34 MILLION/UL (ref 3.88–5.62)
SODIUM SERPL-SCNC: 138 MMOL/L (ref 136–145)
WBC # BLD AUTO: 6.91 THOUSAND/UL (ref 4.31–10.16)

## 2019-08-30 PROCEDURE — 85610 PROTHROMBIN TIME: CPT

## 2019-08-30 PROCEDURE — 85730 THROMBOPLASTIN TIME PARTIAL: CPT

## 2019-08-30 PROCEDURE — 36415 COLL VENOUS BLD VENIPUNCTURE: CPT

## 2019-08-30 PROCEDURE — 80048 BASIC METABOLIC PNL TOTAL CA: CPT

## 2019-08-30 PROCEDURE — 85025 COMPLETE CBC W/AUTO DIFF WBC: CPT

## 2019-09-05 ENCOUNTER — TELEPHONE (OUTPATIENT)
Dept: CARDIOLOGY CLINIC | Facility: CLINIC | Age: 69
End: 2019-09-05

## 2019-09-05 NOTE — TELEPHONE ENCOUNTER
Dr Tulio Salinas came into the office today requesting cardiac clearance for carpal tunnel release with advice on what to do with his medications as well  Can you please advise?

## 2019-09-10 NOTE — TELEPHONE ENCOUNTER
Clearance was done when he saw me last visit  The surgeons office will go over medications with him and if he has any questions, he should call    Thanks

## 2019-09-13 RX ORDER — LANOLIN ALCOHOL/MO/W.PET/CERES
1 CREAM (GRAM) TOPICAL 2 TIMES DAILY
COMMUNITY

## 2019-09-13 NOTE — PRE-PROCEDURE INSTRUCTIONS
Pre-Surgery Instructions:   Medication Instructions    aspirin (ASPIRIN LOW DOSE) 81 MG tablet Patient was instructed by Physician and understands   atorvastatin (LIPITOR) 40 mg tablet Patient was instructed by Physician and understands   Biotin 73614 MCG TABS Patient was instructed by Physician and understands   calcium citrate-vitamin D (CITRACAL+D) 315-200 MG-UNIT per tablet Patient was instructed by Physician and understands   Cholecalciferol (VITAMIN D3) 2000 units capsule Patient was instructed by Physician and understands   Coenzyme Q10 (CO Q-10) 400 MG CAPS Patient was instructed by Physician and understands   cycloSPORINE (RESTASIS) 0 05 % ophthalmic emulsion Patient was instructed by Physician and understands   ezetimibe (ZETIA) 10 mg tablet Patient was instructed by Physician and understands   Icosapent Ethyl (VASCEPA) 1 g CAPS Patient was instructed by Physician and understands   lisinopril (ZESTRIL) 20 mg tablet Instructed patient per Anesthesia Guidelines   metoprolol succinate (TOPROL-XL) 25 mg 24 hr tablet Instructed patient per Anesthesia Guidelines   Misc Natural Products (OSTEO BI-FLEX/5-LOXIN ADVANCED) TABS Patient was instructed by Physician and understands   Naproxen Sodium (ALEVE) 220 MG CAPS Patient was instructed by Physician and understands   tamsulosin (FLOMAX) 0 4 mg Patient was instructed by Physician and understands  Pre op instructions reviewed with pt and wife Alona Moreno, also  [phone], via phone  Instructed to take lisinopril and metoprolol a m of surgery  LD vascepa and asa 09/11/19, LD CoQ10 09/13/19  My Surgical Experience    The following information was developed to assist you to prepare for your operation  What do I need to do before coming to the hospital?   Arrange for a responsible person to drive you to and from the hospital    Arrange care for your children at home   Children are not allowed in the recovery areas of the hospital   Plan to wear clothing that is easy to put on and take off  If you are having shoulder surgery, wear a shirt that buttons or zippers in the front  Bathing  o Shower the evening before and the morning of your surgery with an antibacterial soap  Please refer to the Pre Op Showering Instructions for Surgery Patients Sheet   o Remove nail polish and all body piercing jewelry  o Do not shave any body part for at least 24 hours before surgery-this includes face, arms, legs and upper body  Food  o Nothing to eat or drink after midnight the night before your surgery  This includes candy and chewing gum  o Exception: If your surgery is after 12:00pm (noon), you may have clear liquids such as 7-Up®, ginger ale, apple or cranberry juice, Jell-O®, water, or clear broth until 8:00 am  o Do not drink milk or juice with pulp on the morning before surgery  o Do not drink alcohol 24 hours before surgery  Medicine  o Follow instructions you received from your surgeon about which medicines you may take on the day of surgery  o If instructed to take medicine on the morning of surgery, take pills with just a small sip of water  Call your prescribing doctor for specific information on what to do if you take insulin    What should I bring to the hospital?    Bring:  Rexine Line or a walker, if you have them, for foot or knee surgery   A list of the daily medicines, vitamins, minerals, herbals and nutritional supplements you take   Include the dosages of medicines and the time you take them each day   Glasses, dentures or hearing aids   Minimal clothing; you will be wearing hospital sleepwear   Photo ID; required to verify your identity   If you have a Living Will or Power of , bring a copy of the documents   If you have an ostomy, bring an extra pouch and any supplies you use    Do not bring   Medicines or inhalers   Money, valuables or jewelry    What other information should I know about the day of surgery?  Notify your surgeons if you develop a cold, sore throat, cough, fever, rash or any other illness   Report to the Ambulatory Surgical/Same Day Surgery Unit   You will be instructed to stop at Registration only if you have not been pre-registered   Inform your  fi they do not stay that they will be asked by the staff to leave a phone number where they can be reached   Be available to be reached before surgery  In the event the operating room schedule changes, you may be asked to come in earlier or later than expected    *It is important to tell your doctor and others involved in your health care if you are taking or have been taking any non-prescription drugs, vitamins, minerals, herbals or other nutritional supplements   Any of these may interact with some food or medicines and cause a reaction

## 2019-09-16 ENCOUNTER — TELEPHONE (OUTPATIENT)
Dept: OBGYN CLINIC | Facility: CLINIC | Age: 69
End: 2019-09-16

## 2019-09-16 NOTE — TELEPHONE ENCOUNTER
Alden's wife, Serina Reyes called  He is starting with head cold, not chest aches, no fevers  I explained that he should keep us updated this week and if he gets any additional symptoms to contact us and his PCP  She is wondering if he can take anything for head cold, michel seltzer plus for cold and sinus or anything that may help?

## 2019-09-18 ENCOUNTER — ANESTHESIA EVENT (OUTPATIENT)
Dept: PERIOP | Facility: HOSPITAL | Age: 69
End: 2019-09-18
Payer: COMMERCIAL

## 2019-09-19 ENCOUNTER — HOSPITAL ENCOUNTER (OUTPATIENT)
Facility: HOSPITAL | Age: 69
Setting detail: OUTPATIENT SURGERY
Discharge: HOME | End: 2019-09-19
Attending: ORTHOPAEDIC SURGERY | Admitting: ORTHOPAEDIC SURGERY
Payer: COMMERCIAL

## 2019-09-19 ENCOUNTER — ANESTHESIA (OUTPATIENT)
Dept: PERIOP | Facility: HOSPITAL | Age: 69
End: 2019-09-19
Payer: COMMERCIAL

## 2019-09-19 VITALS
WEIGHT: 212.13 LBS | BODY MASS INDEX: 34.24 KG/M2 | SYSTOLIC BLOOD PRESSURE: 134 MMHG | DIASTOLIC BLOOD PRESSURE: 75 MMHG | TEMPERATURE: 97.5 F | HEART RATE: 65 BPM | RESPIRATION RATE: 18 BRPM | OXYGEN SATURATION: 96 %

## 2019-09-19 PROCEDURE — NC001 PR NO CHARGE: Performed by: ORTHOPAEDIC SURGERY

## 2019-09-19 PROCEDURE — 64721 CARPAL TUNNEL SURGERY: CPT | Performed by: ORTHOPAEDIC SURGERY

## 2019-09-19 PROCEDURE — 64721 CARPAL TUNNEL SURGERY: CPT | Performed by: PHYSICIAN ASSISTANT

## 2019-09-19 RX ORDER — FENTANYL CITRATE/PF 50 MCG/ML
25 SYRINGE (ML) INJECTION
Status: DISCONTINUED | OUTPATIENT
Start: 2019-09-19 | End: 2019-09-19 | Stop reason: HOSPADM

## 2019-09-19 RX ORDER — MAGNESIUM HYDROXIDE 1200 MG/15ML
LIQUID ORAL AS NEEDED
Status: DISCONTINUED | OUTPATIENT
Start: 2019-09-19 | End: 2019-09-19 | Stop reason: HOSPADM

## 2019-09-19 RX ORDER — LIDOCAINE HYDROCHLORIDE 10 MG/ML
INJECTION, SOLUTION EPIDURAL; INFILTRATION; INTRACAUDAL; PERINEURAL AS NEEDED
Status: DISCONTINUED | OUTPATIENT
Start: 2019-09-19 | End: 2019-09-19 | Stop reason: SURG

## 2019-09-19 RX ORDER — CEFAZOLIN SODIUM 1 G/3ML
INJECTION, POWDER, FOR SOLUTION INTRAMUSCULAR; INTRAVENOUS AS NEEDED
Status: DISCONTINUED | OUTPATIENT
Start: 2019-09-19 | End: 2019-09-19 | Stop reason: SURG

## 2019-09-19 RX ORDER — PROPOFOL 10 MG/ML
INJECTION, EMULSION INTRAVENOUS AS NEEDED
Status: DISCONTINUED | OUTPATIENT
Start: 2019-09-19 | End: 2019-09-19 | Stop reason: SURG

## 2019-09-19 RX ORDER — PROPOFOL 10 MG/ML
INJECTION, EMULSION INTRAVENOUS CONTINUOUS PRN
Status: DISCONTINUED | OUTPATIENT
Start: 2019-09-19 | End: 2019-09-19 | Stop reason: SURG

## 2019-09-19 RX ORDER — CEFAZOLIN SODIUM 2 G/50ML
2000 SOLUTION INTRAVENOUS ONCE
Status: DISCONTINUED | OUTPATIENT
Start: 2019-09-19 | End: 2019-09-19 | Stop reason: HOSPADM

## 2019-09-19 RX ORDER — SODIUM CHLORIDE, SODIUM LACTATE, POTASSIUM CHLORIDE, CALCIUM CHLORIDE 600; 310; 30; 20 MG/100ML; MG/100ML; MG/100ML; MG/100ML
125 INJECTION, SOLUTION INTRAVENOUS CONTINUOUS
Status: DISCONTINUED | OUTPATIENT
Start: 2019-09-19 | End: 2019-09-19 | Stop reason: HOSPADM

## 2019-09-19 RX ORDER — FENTANYL CITRATE 50 UG/ML
INJECTION, SOLUTION INTRAMUSCULAR; INTRAVENOUS AS NEEDED
Status: DISCONTINUED | OUTPATIENT
Start: 2019-09-19 | End: 2019-09-19 | Stop reason: SURG

## 2019-09-19 RX ADMIN — FENTANYL CITRATE 50 MCG: 50 INJECTION, SOLUTION INTRAMUSCULAR; INTRAVENOUS at 09:32

## 2019-09-19 RX ADMIN — PROPOFOL 50 MG: 10 INJECTION, EMULSION INTRAVENOUS at 09:38

## 2019-09-19 RX ADMIN — FENTANYL CITRATE 50 MCG: 50 INJECTION, SOLUTION INTRAMUSCULAR; INTRAVENOUS at 09:38

## 2019-09-19 RX ADMIN — SODIUM CHLORIDE, SODIUM LACTATE, POTASSIUM CHLORIDE, AND CALCIUM CHLORIDE: .6; .31; .03; .02 INJECTION, SOLUTION INTRAVENOUS at 09:33

## 2019-09-19 RX ADMIN — CEFAZOLIN 2000 MG: 1 INJECTION, POWDER, FOR SOLUTION INTRAVENOUS at 09:36

## 2019-09-19 RX ADMIN — LIDOCAINE HYDROCHLORIDE 50 MG: 10 INJECTION, SOLUTION EPIDURAL; INFILTRATION; INTRACAUDAL; PERINEURAL at 09:38

## 2019-09-19 RX ADMIN — PROPOFOL 70 MCG/KG/MIN: 10 INJECTION, EMULSION INTRAVENOUS at 09:38

## 2019-09-19 NOTE — OP NOTE
OPERATIVE REPORT  PATIENT NAME: Mackenzie Lee    :  1950  MRN: [id number]  Pt Location: [facility] OR ROOM 03    SURGERY DATE: 2019    Surgeon(s) and Role:     * Shayy Bates MD - Primary     * Madina Lugo PA-C - [address] [place] [address] Rd A  T C  And [facility] 2nd assistant    Preop Diagnosis:  Carpal tunnel syndrome on right [G56 01]    Post-Op Diagnosis Codes:     * Carpal tunnel syndrome on right [G56 01]    Procedure(s) (LRB):  RELEASE CARPAL TUNNEL (Right)    Specimen(s):  * No specimens in log *    Estimated Blood Loss:   Minimal    Drains:  * No LDAs found *    Anesthesia Type:   IV Sedation with local Anesthesia provided by the attending surgeon as a median nerve block under alcohol prep with a mixture of 1% lidocaine plain and 0 5% Marcaine plain    Operative Indications:  Carpal tunnel syndrome on right [G56 01]  Genesis Gallegos is a 53-year-old male who has been suffering long-term with right hand pain and carpal tunnel syndrome  He had failed conservative measures and wished to undergo a right carpal tunnel release  He understood the risks and benefits of that procedure wished to go ahead  The risks are inclusive of but not limited to infection, stiffness, nerve injury causing numbness pain and weakness, worsening of symptoms, failure to regain full strength and ability, and need for further surgery  Operative Findings:  Right hand examined under loupe magnification demonstrated significant compression of the median nerve in the carpal tunnel  We were able to release the nerve completely with both visual inspection and palpation verifying that there was complete release of the median nerve from the transverse carpal ligament  Complications:   None    Procedure and Technique:  Genesis Gallegos was taken to the operating room and placed supine on the OR table  He was given preoperative IV antibiotics    He had sedation and we took a surgical time-out to identify the right hand as the correct operative site  We did prep the right hand with alcohol and then placed a median nerve block as described above  We then prepped and draped the right upper extremity usual sterile fashion  We exsanguinated and inflated tourniquet  Total tourniquet time was less than 15 minutes  Under loupe magnification, we made a longitudinal incision along the ulnar border flexed ring finger just proximal to Lundberg's cardinal line utilizing a 15 blade  We did carefully dissect past subcutaneous adipose to the palmar fascia which was incised longitudinally with a deep 15 blade  We then longitudinally released a rather thickened transverse carpal ligament with a mixture of a 15 blade as well as scissors proximally and distally protecting the median nerve throughout this procedure  We were able to easily pass a [place] elevator proximally and distally demonstrating complete release of the transverse carpal ligament and nice freeing up of the median nerve  The nerve itself appeared to have been significantly compressed  We then irrigated and closed with 4-0 nylon suture  Dry, sterile dressings were applied with an Ace bandage  Tourniquet was let down  He tolerated procedure well and transferred to recovery room stable condition    He will follow up in approximately 10 days for suture removal    I was present for the entire procedure and A qualified resident physician was not available    Patient Disposition:  PACU     SIGNATURE: Luis Clay MD  DATE: September 19, 2019  TIME: 9:55 AM

## 2019-09-19 NOTE — H&P
Assessment/Plan:  Right carpal tunnel syndrome    The patient would like to proceed with right carpal tunnel syndrome  We discussed the procedure and risks at length, including but not limited to, infection, bleeding, wound issues, nerve injury, blood clot, stiffness, failure of procedure, and need for additional surgery  We will see the patient back at the time of surgery  Subjective:   Radha Vides is a 79 y o  male with right carpal tunnel syndrome who notes ongoing pain and and numbness about the hand  Review of Systems   Constitutional: Negative for chills, fever and unexpected weight change  HENT: Negative for hearing loss, nosebleeds and sore throat  Eyes: Negative for pain, redness and visual disturbance  Respiratory: Negative for cough, shortness of breath and wheezing  Cardiovascular: Negative for chest pain, palpitations and leg swelling  Gastrointestinal: Negative for abdominal pain, nausea and vomiting  Endocrine: Negative for polyphagia and polyuria  Genitourinary: Negative for dysuria and hematuria  Musculoskeletal:        See HPI   Skin: Negative for rash and wound  Neurological: Positive for numbness  Negative for dizziness and headaches  Psychiatric/Behavioral: Negative for decreased concentration and suicidal ideas  The patient is not nervous/anxious            Past Medical History:   Diagnosis Date    Abnormal electrocardiogram     last assessed 12/19/13    Arthritis     hand and forearm    Chest pain     Coronary artery disease     Enthesopathy of elbow 02/15/2006    Epididymo-orchitis 07/10/2012    Hyperlipidemia     Hypertension     Obesity     Voice disturbance 05/27/2008       Past Surgical History:   Procedure Laterality Date    ANGIOPLASTY  2013    one stent RCA    APPENDECTOMY      CARPAL TUNNEL RELEASE Left 2018    CORONARY ANGIOPLASTY      CORONARY ANGIOPLASTY WITH STENT PLACEMENT      CORONARY ANGIOPLASTY WITH STENT PLACEMENT  2013  NE REVISE MEDIAN N/CARPAL TUNNEL SURG Left 8/13/2018    Procedure: RELEASE CARPAL TUNNEL;  Surgeon: Khurram Rosas MD;  Location: [facility];  Service: Orthopedics       Family History   Problem Relation Age of Onset    Heart disease Mother     Hypertension Mother     Heart disease Father         CHF   [place] Arthritis Brother     Obesity Brother     Hypertension Brother     No Known Problems Maternal Aunt     No Known Problems Maternal Uncle     No Known Problems Paternal Aunt     No Known Problems Paternal Uncle     No Known Problems Maternal Grandmother     No Known Problems Maternal Grandfather     No Known Problems Paternal Grandmother     No Known Problems Paternal Grandfather        Social History     Occupational History    Not on file   Tobacco Use    Smoking status: Never Smoker    Smokeless tobacco: Never Used   Substance and Sexual Activity    Alcohol use: No    Drug use: No    Sexual activity: Not on file         Current Facility-Administered Medications:     ceFAZolin (ANCEF) IVPB (premix) 2,000 mg, 2,000 mg, Intravenous, Once, JUDI Nova-ALEXA    lactated ringers infusion, 125 mL/hr, Intravenous, Continuous, Melissa Kearns,     No Known Allergies    Objective:  Vitals:    09/19/19 0740   BP: 167/77   Pulse: 72   Resp: 18   Temp: 98 1 °F (36 7 °C)   SpO2: 98%       Ortho Exam    Physical Exam   Constitutional: He is oriented to person, place, and time  He appears well-developed and well-nourished  HENT:   Head: Normocephalic and atraumatic  Eyes: Pupils are equal, round, and reactive to light  Conjunctivae are normal    Neck: Normal range of motion  Neck supple  Cardiovascular: Normal rate and intact distal pulses  Pulmonary/Chest: Effort normal  No respiratory distress  Musculoskeletal:   As noted in HPI   Neurological: He is alert and oriented to person, place, and time  Skin: Skin is warm and dry  Psychiatric: He has a normal mood and affect   His behavior is normal    Vitals reviewed      Right hand: Positive Tinels test

## 2019-09-19 NOTE — DISCHARGE INSTRUCTIONS
INSTRUCTIONS FOLLOWING YOUR HAND/WRIST SURGERY     First follow-up visit after surgery   Call the office the day after your surgery & make an appointment for 10-14 days after surgery to see Dr Josefa Trujillo  At that appointment, your sutures will be removed  Dressing & Wound Care   If you are in a splint, keep the original dressing on and dry until you are seen by Dr Josefa Trujillo  You may take a bath by covering your arm in a garbage bag or other waterproof bag  Tie it securely with rubber bands and duct tape  Keep your dressing as clean as possible     If you are only in a soft dressing, you may take the dressing off at 3 days after your operation; at which point you may shower and get the incision wet  Do not soak the wound in dish, bath, or pool water until after the stitches have been removed     Swelling and Discoloration   You will notice some swelling of your hand - this is normal  Elevate your hand above the level of your heart as much as possible for the first week  When you sleep, place your hand on several pillows  You may notice a bluish discoloration of your fingers  This is also normal  This discoloration may change from blue to purple to green to yellow, then will slowly go away over a few weeks time  Discomfort   You will experience some pain and discomfort after surgery  By the third day, this pain usually decreases significantly  You will be given a prescription for pain medication  Take the medication as prescribed  If the discomfort is mild, you can take 1 or 2 Tylenol every 4 - 6 hours as needed, instead of the prescribed pain medication  Temperature   A temperature of up to 101  5ºF is common for the first 2 days after surgery  If your temperature is elevated above 101 5º F, call the office  Exercise   Unless instructed otherwise, you may gently open and close your fingers  Do not perform any exercises that cause you to sweat   Sweating may increase complications with your incision  We hope this answers many of your questions regarding your surgery  These are only guidelines however  If you have any other concerns or questions at any time, do not hesitate to call the office [phone]

## 2019-09-19 NOTE — PERIOPERATIVE NURSING NOTE
Pt d/c to home at this time w/ wife   Via: Walking  Pt left with all belongings  Iv was D/C intact with dry sterile dressing  Encouraged to keep follow up appointments, Verbalized understanding  D/C instructions reviewed and explained  Verbalized understanding

## 2019-09-19 NOTE — ANESTHESIA PREPROCEDURE EVALUATION
Essential hypertension   CAD in native artery   Obesity (BMI 35 0-39 9 without comorbidity)   Enlarged prostate without lower urinary tract symptoms (luts)   Hyperlipidemia   Ischemic heart disease, chronic     Review of Systems/Medical History  Patient summary reviewed  Chart reviewed      Cardiovascular  Hyperlipidemia, Hypertension , CAD , Cardiac stents     Pulmonary  Negative pulmonary ROS        GI/Hepatic  Negative GI/hepatic ROS          Prostatic disorder, benign prostatic hyperplasia       Endo/Other    Obesity  morbid obesity   GYN       Hematology  Negative hematology ROS      Musculoskeletal  Negative musculoskeletal ROS        Neurology  Negative neurology ROS      Psychology   Negative psychology ROS              Physical Exam    Airway    Mallampati score: II  TM Distance: >3 FB  Neck ROM: full     Dental       Cardiovascular  Rhythm: regular, Rate: normal,     Pulmonary  Breath sounds clear to auscultation,     Other Findings        Anesthesia Plan  ASA Score- 2     Anesthesia Type- IV sedation with anesthesia with ASA Monitors  Additional Monitors:   Airway Plan:         Plan Factors-    Induction- intravenous  Postoperative Plan-     Informed Consent- Anesthetic plan and risks discussed with patient  I personally reviewed this patient with the CRNA  Discussed and agreed on the Anesthesia Plan with the CRNA  Svetlana Jones

## 2019-10-02 ENCOUNTER — OFFICE VISIT (OUTPATIENT)
Dept: OBGYN CLINIC | Facility: CLINIC | Age: 69
End: 2019-10-02

## 2019-10-02 VITALS
WEIGHT: 208 LBS | DIASTOLIC BLOOD PRESSURE: 81 MMHG | HEIGHT: 66 IN | BODY MASS INDEX: 33.43 KG/M2 | SYSTOLIC BLOOD PRESSURE: 128 MMHG | HEART RATE: 66 BPM

## 2019-10-02 DIAGNOSIS — G56.01 CARPAL TUNNEL SYNDROME ON RIGHT: Primary | ICD-10-CM

## 2019-10-02 PROCEDURE — 99024 POSTOP FOLLOW-UP VISIT: CPT | Performed by: PHYSICIAN ASSISTANT

## 2019-10-02 NOTE — PROGRESS NOTES
Assessment/Plan:  1  Carpal tunnel syndrome on right       The patient is doing well and will gradually increase his activity as tolerated  He will refrain from heavy lifting or submerging the hand in water for 1 more week  He will follow up as needed  Subjective:   Priya Johnston is a 79 y o  male who presents today for follow-up of his right hand, now about 2 weeks status post carpal tunnel release  He is doing well and notes only some mild soreness about the wrist  He notes good sensation of the hand  He has been using it for simple daily activities without difficulty       Review of Systems      Past Medical History:   Diagnosis Date    Abnormal electrocardiogram     last assessed 12/19/13    Arthritis     hand and forearm    Chest pain     Coronary artery disease     Enthesopathy of elbow 02/15/2006    Epididymo-orchitis 07/10/2012    Hyperlipidemia     Hypertension     Obesity     Voice disturbance 05/27/2008       Past Surgical History:   Procedure Laterality Date    ANGIOPLASTY  2013    one stent RCA    APPENDECTOMY      CARPAL TUNNEL RELEASE Left 2018    CORONARY ANGIOPLASTY      CORONARY ANGIOPLASTY WITH STENT PLACEMENT      CORONARY ANGIOPLASTY WITH STENT PLACEMENT  2013    CA REVISE MEDIAN N/CARPAL TUNNEL SURG Left 8/13/2018    Procedure: RELEASE CARPAL TUNNEL;  Surgeon: Candelario Habermann, MD;  Location: [facility];  Service: Orthopedics    CA REVISE MEDIAN N/CARPAL TUNNEL SURG Right 9/19/2019    Procedure: RELEASE CARPAL TUNNEL;  Surgeon: Candelario Habermann, MD;  Location[facility] MAIN OR;  Service: Orthopedics       Family History   Problem Relation Age of Onset    Heart disease Mother     Hypertension Mother     Heart disease Father         CHF   [place] Arthritis Brother     Obesity Brother     Hypertension Brother     No Known Problems Maternal Aunt     No Known Problems Maternal Uncle     No Known Problems Paternal Aunt     No Known Problems Paternal Uncle     No Known Problems Maternal Grandmother     No Known Problems Maternal Grandfather     No Known Problems Paternal Grandmother     No Known Problems Paternal Grandfather        Social History     Occupational History    Not on file   Tobacco Use    Smoking status: Never Smoker    Smokeless tobacco: Never Used   Substance and Sexual Activity    Alcohol use: No    Drug use: No    Sexual activity: Not on file         Current Outpatient Medications:     aspirin (ASPIRIN LOW DOSE) 81 MG tablet, Take 1 tablet by mouth daily Last dose is 8/7/18 , Disp: , Rfl:     atorvastatin (LIPITOR) 40 mg tablet, TAKE 1 TABLET DAILY AT  BEDTIME, Disp: 90 tablet, Rfl: 2    Biotin 64569 MCG TABS, Take 5,000 mg by mouth daily, Disp: , Rfl:     calcium citrate-vitamin D (CITRACAL+D) 315-200 MG-UNIT per tablet, Take 1 tablet by mouth 2 (two) times a day, Disp: , Rfl:     Cholecalciferol (VITAMIN D3) 2000 units capsule, Take 2,000 Units by mouth daily  , Disp: , Rfl:     Coenzyme Q10 (CO Q-10) 400 MG CAPS, Take 1 capsule by mouth Daily, Disp: , Rfl:     cycloSPORINE (RESTASIS) 0 05 % ophthalmic emulsion, Administer to both eyes every 12 (twelve) hours  , Disp: , Rfl:     ezetimibe (ZETIA) 10 mg tablet, TAKE 1 TABLET BY MOUTH  EVERY MORNING, Disp: 90 tablet, Rfl: 2    Icosapent Ethyl (VASCEPA) 1 g CAPS, Take 2 capsules (2 g total) by mouth 2 (two) times a day (Patient taking differently: Take 2 capsules by mouth 2 (two) times a day ), Disp: 360 capsule, Rfl: 3    lisinopril (ZESTRIL) 20 mg tablet, Take 1 tablet (20 mg total) by mouth daily, Disp: 90 tablet, Rfl: 3    metoprolol succinate (TOPROL-XL) 25 mg 24 hr tablet, TAKE 1 TABLET DAILY, Disp: 90 tablet, Rfl: 3    Misc Natural Products (OSTEO BI-FLEX/5-LOXIN ADVANCED) TABS, Take by mouth daily, Disp: , Rfl:     Naproxen Sodium (ALEVE) 220 MG CAPS, Take by mouth, Disp: , Rfl:     tamsulosin (FLOMAX) 0 4 mg, TAKE 1 CAPSULE BY MOUTH  DAILY AT BEDTIME, Disp: 90 capsule, Rfl: 1    No Known Allergies    Objective:  Vitals:    10/02/19 0737   BP: 128/81   Pulse: 66       Ortho Exam    Physical Exam  Right wrist:  Sutures removed  Incision clean dry and intact  No erythema or ecchymosis appreciated  Sensation intact median, ulnar, and radial nerve distributions  Patient moves fingers and wrist freely    No significant swelling appreciated

## 2019-10-11 ENCOUNTER — TELEPHONE (OUTPATIENT)
Dept: CARDIOLOGY CLINIC | Facility: CLINIC | Age: 69
End: 2019-10-11

## 2019-10-23 ENCOUNTER — TELEPHONE (OUTPATIENT)
Dept: CARDIOLOGY CLINIC | Facility: CLINIC | Age: 69
End: 2019-10-23

## 2019-10-23 NOTE — TELEPHONE ENCOUNTER
Fax rcd from [facility] regarding patient taking zestril and benicar  I do not see Benicar on his medication list    Just to confirm, should he be taking benicar?

## 2019-11-04 ENCOUNTER — CLINICAL SUPPORT (OUTPATIENT)
Dept: FAMILY MEDICINE CLINIC | Facility: CLINIC | Age: 69
End: 2019-11-04
Payer: COMMERCIAL

## 2019-11-04 DIAGNOSIS — Z23 NEED FOR INFLUENZA VACCINATION: Primary | ICD-10-CM

## 2019-11-04 PROCEDURE — G0008 ADMIN INFLUENZA VIRUS VAC: HCPCS

## 2019-11-04 PROCEDURE — 90662 IIV NO PRSV INCREASED AG IM: CPT

## 2019-12-30 ENCOUNTER — OFFICE VISIT (OUTPATIENT)
Dept: FAMILY MEDICINE CLINIC | Facility: CLINIC | Age: 69
End: 2019-12-30
Payer: COMMERCIAL

## 2019-12-30 VITALS
SYSTOLIC BLOOD PRESSURE: 136 MMHG | HEART RATE: 88 BPM | BODY MASS INDEX: 35.03 KG/M2 | TEMPERATURE: 97.7 F | WEIGHT: 218 LBS | HEIGHT: 66 IN | RESPIRATION RATE: 18 BRPM | DIASTOLIC BLOOD PRESSURE: 80 MMHG

## 2019-12-30 DIAGNOSIS — J01.90 ACUTE NON-RECURRENT SINUSITIS, UNSPECIFIED LOCATION: Primary | ICD-10-CM

## 2019-12-30 PROCEDURE — 99213 OFFICE O/P EST LOW 20 MIN: CPT | Performed by: FAMILY MEDICINE

## 2019-12-30 PROCEDURE — 1160F RVW MEDS BY RX/DR IN RCRD: CPT | Performed by: FAMILY MEDICINE

## 2019-12-30 PROCEDURE — 3008F BODY MASS INDEX DOCD: CPT | Performed by: FAMILY MEDICINE

## 2019-12-30 PROCEDURE — 1036F TOBACCO NON-USER: CPT | Performed by: FAMILY MEDICINE

## 2019-12-30 RX ORDER — AMOXICILLIN 875 MG/1
875 TABLET, COATED ORAL 2 TIMES DAILY
Qty: 14 TABLET | Refills: 0 | Status: SHIPPED | OUTPATIENT
Start: 2019-12-30 | End: 2020-01-06

## 2019-12-30 NOTE — PROGRESS NOTES
Chief Complaint   Patient presents with    Cough    Nasal Drainage        Patient ID: Emely Hough is a 81 y o  male  HPI   pt is seeing for sinus pressure and cough with postnasal drip x 10 + days started with URI symptoms -  Was initially better then worsening again x 2 days, tried Corcidine BP with temporary relief     The following portions of the patient's history were reviewed and updated as appropriate: allergies, current medications, past family history, past medical history, past social history, past surgical history and problem list     Review of Systems   Constitutional: Negative for chills, fatigue and fever  HENT: Positive for congestion, postnasal drip, rhinorrhea, sinus pressure and sinus pain  Negative for sore throat  Respiratory: Positive for cough  Negative for chest tightness, shortness of breath and wheezing  Gastrointestinal: Negative          Current Outpatient Medications   Medication Sig Dispense Refill    aspirin (ASPIRIN LOW DOSE) 81 MG tablet Take 1 tablet by mouth daily Last dose is 8/7/18       atorvastatin (LIPITOR) 40 mg tablet TAKE 1 TABLET DAILY AT  BEDTIME 90 tablet 2    Biotin 19080 MCG TABS Take 5,000 mg by mouth daily      calcium citrate-vitamin D (CITRACAL+D) 315-200 MG-UNIT per tablet Take 1 tablet by mouth 2 (two) times a day      Cholecalciferol (VITAMIN D3) 2000 units capsule Take 2,000 Units by mouth daily        Coenzyme Q10 (CO Q-10) 400 MG CAPS Take 1 capsule by mouth Daily      cycloSPORINE (RESTASIS) 0 05 % ophthalmic emulsion Administer to both eyes every 12 (twelve) hours        ezetimibe (ZETIA) 10 mg tablet TAKE 1 TABLET BY MOUTH  EVERY MORNING 90 tablet 2    Icosapent Ethyl (VASCEPA) 1 g CAPS Take 2 capsules (2 g total) by mouth 2 (two) times a day (Patient taking differently: Take 2 capsules by mouth 2 (two) times a day ) 360 capsule 3    lisinopril (ZESTRIL) 20 mg tablet Take 1 tablet (20 mg total) by mouth daily 90 tablet 3    metoprolol succinate (TOPROL-XL) 25 mg 24 hr tablet TAKE 1 TABLET DAILY 90 tablet 3    Misc Natural Products (OSTEO BI-FLEX/5-LOXIN ADVANCED) TABS Take by mouth daily      Naproxen Sodium (ALEVE) 220 MG CAPS Take by mouth      tamsulosin (FLOMAX) 0 4 mg TAKE 1 CAPSULE BY MOUTH  DAILY AT BEDTIME 90 capsule 1    TURMERIC PO Take by mouth       No current facility-administered medications for this visit  Objective:    /80 (BP Location: Left arm, Patient Position: Sitting, Cuff Size: Large)   Pulse 88   Temp 97 7 °F (36 5 °C) (Tympanic)   Resp 18   Ht 5' 6" (1 676 m)   Wt 98 9 kg (218 lb)   BMI 35 19 kg/m²        Physical Exam   Constitutional: No distress  HENT:   Right Ear: Tympanic membrane normal    Left Ear: Tympanic membrane normal    Nose: Right sinus exhibits maxillary sinus tenderness and frontal sinus tenderness  Left sinus exhibits maxillary sinus tenderness and frontal sinus tenderness  Mouth/Throat: No oropharyngeal exudate, posterior oropharyngeal edema or posterior oropharyngeal erythema  Cardiovascular: Normal rate and regular rhythm  Pulmonary/Chest: Effort normal and breath sounds normal  No respiratory distress  He has no wheezes  He has no rales  Assessment/Plan:         Diagnoses and all orders for this visit:    Acute non-recurrent sinusitis, unspecified location  -     amoxicillin (AMOXIL) 875 mg tablet;  Take 1 tablet (875 mg total) by mouth 2 (two) times a day for 7 days    Other orders  -     TURMERIC PO; Take by mouth          rto prn                   Heather Gay MD

## 2020-01-02 ENCOUNTER — TELEPHONE (OUTPATIENT)
Dept: FAMILY MEDICINE CLINIC | Facility: CLINIC | Age: 70
End: 2020-01-02

## 2020-01-02 DIAGNOSIS — J01.90 ACUTE NON-RECURRENT SINUSITIS, UNSPECIFIED LOCATION: Primary | ICD-10-CM

## 2020-01-02 RX ORDER — PROMETHAZINE HYDROCHLORIDE AND CODEINE PHOSPHATE 6.25; 1 MG/5ML; MG/5ML
5 SYRUP ORAL EVERY 4 HOURS PRN
Qty: 120 ML | Refills: 0 | Status: SHIPPED | OUTPATIENT
Start: 2020-01-02 | End: 2020-03-09

## 2020-01-12 DIAGNOSIS — N40.0 BPH WITHOUT URINARY OBSTRUCTION: ICD-10-CM

## 2020-01-12 RX ORDER — TAMSULOSIN HYDROCHLORIDE 0.4 MG/1
CAPSULE ORAL
Qty: 90 CAPSULE | Refills: 0 | Status: SHIPPED | OUTPATIENT
Start: 2020-01-12 | End: 2020-02-17

## 2020-02-06 ENCOUNTER — APPOINTMENT (OUTPATIENT)
Dept: RADIOLOGY | Facility: CLINIC | Age: 70
End: 2020-02-06
Payer: COMMERCIAL

## 2020-02-06 ENCOUNTER — OFFICE VISIT (OUTPATIENT)
Dept: OBGYN CLINIC | Facility: CLINIC | Age: 70
End: 2020-02-06
Payer: COMMERCIAL

## 2020-02-06 VITALS
DIASTOLIC BLOOD PRESSURE: 78 MMHG | WEIGHT: 212 LBS | HEART RATE: 70 BPM | SYSTOLIC BLOOD PRESSURE: 151 MMHG | HEIGHT: 65 IN | BODY MASS INDEX: 35.32 KG/M2

## 2020-02-06 DIAGNOSIS — M54.41 ACUTE MIDLINE LOW BACK PAIN WITH BILATERAL SCIATICA: Primary | ICD-10-CM

## 2020-02-06 DIAGNOSIS — R29.898 HIP TIGHTNESS: ICD-10-CM

## 2020-02-06 DIAGNOSIS — M25.552 BILATERAL HIP PAIN: ICD-10-CM

## 2020-02-06 DIAGNOSIS — M54.42 ACUTE MIDLINE LOW BACK PAIN WITH BILATERAL SCIATICA: Primary | ICD-10-CM

## 2020-02-06 DIAGNOSIS — M25.551 BILATERAL HIP PAIN: ICD-10-CM

## 2020-02-06 PROCEDURE — 3008F BODY MASS INDEX DOCD: CPT | Performed by: ORTHOPAEDIC SURGERY

## 2020-02-06 PROCEDURE — 73522 X-RAY EXAM HIPS BI 3-4 VIEWS: CPT

## 2020-02-06 PROCEDURE — 1160F RVW MEDS BY RX/DR IN RCRD: CPT | Performed by: ORTHOPAEDIC SURGERY

## 2020-02-06 PROCEDURE — 99213 OFFICE O/P EST LOW 20 MIN: CPT | Performed by: ORTHOPAEDIC SURGERY

## 2020-02-06 PROCEDURE — 4040F PNEUMOC VAC/ADMIN/RCVD: CPT | Performed by: ORTHOPAEDIC SURGERY

## 2020-02-06 PROCEDURE — 3078F DIAST BP <80 MM HG: CPT | Performed by: ORTHOPAEDIC SURGERY

## 2020-02-06 PROCEDURE — 1036F TOBACCO NON-USER: CPT | Performed by: ORTHOPAEDIC SURGERY

## 2020-02-06 PROCEDURE — 3077F SYST BP >= 140 MM HG: CPT | Performed by: ORTHOPAEDIC SURGERY

## 2020-02-06 NOTE — PROGRESS NOTES
Assessment/Plan:  1  Acute midline low back pain with bilateral sciatica  Ambulatory referral to Physical Therapy   2  Hip tightness  XR hips bilateral 3-4 vw w pelvis if performed    Ambulatory referral to Physical Therapy       Jian Arciniega has low back pain with intermittent sensation down his legs  I do think a lot of his discomfort is likely related to stiffness and poor flexibility  He does have some degenerative changes in his lumbar spine however he has a lot of tightness related to his hips and no significant degenerative changes in the hips  I would like for him to begin physical therapy on his low back and improve his range of motion and flexibility  Strengthening of his core will likely improve some of his back and sciatic pain  If he has persistent or worsening symptoms consistent with sciatica we could consider further imaging of the lumbar spine after PT  He will follow up in 6 weeks if the pain persists  Subjective:   Ramon Bates is a 81 y o  male who presents for evaluation for low back pain he denies any significant injury or trauma  He states he has had increased pain in his low back radiating into bilateral legs intermittently over the past 1-2 months  He denies any lifting injury  He denies any numbness down into his legs  He denies any history of low back problems in the past   He denies any fall  He describes today's pain is an aching throbbing intermittent discomfort in his low back radiating into the buttocks bilaterally  He states that is not 1 side more than the other  It worsens with bending forward and walking and improves with rest   Denies any pain anteriorly in his hips  He has been using topical pain creams and patches which helps slightly  Review of Systems   Constitutional: Negative for chills, fever and unexpected weight change  HENT: Negative for hearing loss, nosebleeds and sore throat  Eyes: Negative for pain, redness and visual disturbance  Respiratory: Negative for cough, shortness of breath and wheezing  Cardiovascular: Negative for chest pain, palpitations and leg swelling  Gastrointestinal: Negative for abdominal pain, nausea and vomiting  Endocrine: Negative for polyphagia and polyuria  Genitourinary: Negative for dysuria and hematuria  Musculoskeletal:        See HPI   Skin: Negative for rash and wound  Neurological: Negative for dizziness, numbness and headaches  Psychiatric/Behavioral: Negative for decreased concentration and suicidal ideas  The patient is not nervous/anxious            Past Medical History:   Diagnosis Date    Abnormal electrocardiogram     last assessed 12/19/13    Arthritis     hand and forearm    Chest pain     Coronary artery disease     Enthesopathy of elbow 02/15/2006    Epididymo-orchitis 07/10/2012    Hyperlipidemia     Hypertension     Obesity     Voice disturbance 05/27/2008       Past Surgical History:   Procedure Laterality Date    ANGIOPLASTY  2013    one stent RCA    APPENDECTOMY      CARPAL TUNNEL RELEASE Left 2018    CORONARY ANGIOPLASTY      CORONARY ANGIOPLASTY WITH STENT PLACEMENT      CORONARY ANGIOPLASTY WITH STENT PLACEMENT  2013    ME REVISE MEDIAN N/CARPAL TUNNEL SURG Left 8/13/2018    Procedure: RELEASE CARPAL TUNNEL;  Surgeon: Ruiz Dodd MD;  Location: [facility] MAIN [facility];  Service: Orthopedics    ME REVISE MEDIAN N/CARPAL TUNNEL SURG Right 9/19/2019    Procedure: RELEASE CARPAL TUNNEL;  Surgeon: Ruiz Dodd MD;  Location: [facility] MAIN OR;  Service: Orthopedics       Family History   Problem Relation Age of Onset    Heart disease Mother     Hypertension Mother     Heart disease Father         CHF   [de-identified] Arthritis Brother     Obesity Brother     Hypertension Brother     No Known Problems Maternal Aunt     No Known Problems Maternal Uncle     No Known Problems Paternal Aunt     No Known Problems Paternal Uncle     No Known Problems Maternal Grandmother     No Known Problems Maternal Grandfather     No Known Problems Paternal Grandmother     No Known Problems Paternal Grandfather        Social History     Occupational History    Not on file   Tobacco Use    Smoking status: Never Smoker    Smokeless tobacco: Never Used   Substance and Sexual Activity    Alcohol use: No    Drug use: No    Sexual activity: Not on file         Current Outpatient Medications:     aspirin (ASPIRIN LOW DOSE) 81 MG tablet, Take 1 tablet by mouth daily Last dose is 8/7/18 , Disp: , Rfl:     atorvastatin (LIPITOR) 40 mg tablet, TAKE 1 TABLET DAILY AT  BEDTIME, Disp: 90 tablet, Rfl: 2    Biotin 18100 MCG TABS, Take 5,000 mg by mouth daily, Disp: , Rfl:     calcium citrate-vitamin D (CITRACAL+D) 315-200 MG-UNIT per tablet, Take 1 tablet by mouth 2 (two) times a day, Disp: , Rfl:     Cholecalciferol (VITAMIN D3) 2000 units capsule, Take 2,000 Units by mouth daily  , Disp: , Rfl:     Coenzyme Q10 (CO Q-10) 400 MG CAPS, Take 1 capsule by mouth Daily, Disp: , Rfl:     cycloSPORINE (RESTASIS) 0 05 % ophthalmic emulsion, Administer to both eyes every 12 (twelve) hours  , Disp: , Rfl:     ezetimibe (ZETIA) 10 mg tablet, TAKE 1 TABLET BY MOUTH  EVERY MORNING, Disp: 90 tablet, Rfl: 2    Icosapent Ethyl (VASCEPA) 1 g CAPS, Take 2 capsules (2 g total) by mouth 2 (two) times a day (Patient taking differently: Take 2 capsules by mouth 2 (two) times a day ), Disp: 360 capsule, Rfl: 3    lisinopril (ZESTRIL) 20 mg tablet, Take 1 tablet (20 mg total) by mouth daily, Disp: 90 tablet, Rfl: 3    metoprolol succinate (TOPROL-XL) 25 mg 24 hr tablet, TAKE 1 TABLET DAILY, Disp: 90 tablet, Rfl: 3    Misc Natural Products (OSTEO BI-FLEX/5-LOXIN ADVANCED) TABS, Take by mouth daily, Disp: , Rfl:     tamsulosin (FLOMAX) 0 4 mg, TAKE 1 CAPSULE BY MOUTH  DAILY AT BEDTIME, Disp: 90 capsule, Rfl: 0    TURMERIC PO, Take by mouth, Disp: , Rfl:     Naproxen Sodium (ALEVE) 220 MG CAPS, Take by mouth, Disp: , Rfl:   promethazine-codeine (PHENERGAN WITH CODEINE) 6 25-10 mg/5 mL syrup, Take 5 mL by mouth every 4 (four) hours as needed for cough (Patient not taking: Reported on 2/6/2020), Disp: 120 mL, Rfl: 0    No Known Allergies    Objective:  Vitals:    02/06/20 0914   BP: 151/78   Pulse: 70       Right Hip Exam     Tenderness   The patient is experiencing tenderness in the posterior  Range of Motion   Internal rotation:  5 abnormal     Tests   MACIE: negative    Other   Erythema: absent  Sensation: normal  Pulse: present      Left Hip Exam     Tenderness   The patient is experiencing tenderness in the posterior  Range of Motion   Internal rotation: 5 abnormal     Tests   MACIE: negative    Other   Erythema: absent  Sensation: normal  Pulse: present      Back Exam     Tenderness   Back tenderness location: Mild tenderness to palpation midline lumbar spine  Range of Motion   Extension: normal   Flexion: normal     Muscle Strength   Right Quadriceps:  5/5   Left Quadriceps:  5/5   Right Hamstrings:  5/5   Left Hamstrings:  5/5     Tests   Straight leg raise right: negative  Straight leg raise left: negative    Other   Toe walk: normal  Heel walk: normal  Sensation: normal  Erythema: no back redness            Physical Exam   Constitutional: He is oriented to person, place, and time  He appears well-developed and well-nourished  HENT:   Head: Normocephalic and atraumatic  Eyes: Pupils are equal, round, and reactive to light  Conjunctivae are normal    Neck: Normal range of motion  Neck supple  Cardiovascular: Normal rate and intact distal pulses  Pulmonary/Chest: Effort normal  No respiratory distress  Musculoskeletal:   As noted in HPI   Neurological: He is alert and oriented to person, place, and time  Skin: Skin is warm and dry  Psychiatric: He has a normal mood and affect  His behavior is normal    Nursing note and vitals reviewed        I have personally reviewed pertinent films in PACS and my interpretation is as follows:  Five view x-rays of bilateral hips and AP pelvis demonstrate no evidence of significant degenerative changes or fracture at the hips  Slight cam deformity bilaterally  Partial visualization of the lumbar spine of L4-5 demonstrates degenerative changes

## 2020-02-11 ENCOUNTER — EVALUATION (OUTPATIENT)
Dept: PHYSICAL THERAPY | Facility: CLINIC | Age: 70
End: 2020-02-11
Payer: COMMERCIAL

## 2020-02-11 DIAGNOSIS — R29.898 HIP TIGHTNESS: ICD-10-CM

## 2020-02-11 DIAGNOSIS — M54.42 ACUTE MIDLINE LOW BACK PAIN WITH BILATERAL SCIATICA: ICD-10-CM

## 2020-02-11 DIAGNOSIS — M54.41 ACUTE MIDLINE LOW BACK PAIN WITH BILATERAL SCIATICA: ICD-10-CM

## 2020-02-11 PROCEDURE — 97161 PT EVAL LOW COMPLEX 20 MIN: CPT | Performed by: PHYSICAL THERAPIST

## 2020-02-11 NOTE — PROGRESS NOTES
PT Evaluation     Today's date: 2020  Patient name: Benigno Quintero  : 1950  MRN: [id number]  Referring provider: Sylvia Ramos DO  Dx:   Encounter Diagnosis     ICD-10-CM    1  Hip tightness R29 898 Ambulatory referral to Physical Therapy   2  Acute midline low back pain with bilateral sciatica M54 42 Ambulatory referral to Physical Therapy    M54 41                   Assessment  Assessment details: Patriciaann Salvage Giancamillipresents with signs and symptoms consistent with Hip tightness  Acute midline low back pain with bilateral sciatica, with loss of range of motion, strength and spinal stabilization  Presents with high reactivity  Benigno Quintero would benefit with physical therapy to address these impairments to return to prior level of function  Impairments: abnormal or restricted ROM, activity intolerance, impaired physical strength, lacks appropriate home exercise program and pain with function  Understanding of Dx/Px/POC: good   Prognosis: good    Goals  STG  Initiate HEP  Able to stand 30 min without pain in 3 weeks  LTG  Independent with HEP  Able to stand 1 hour without pain in 6 weeks  FOTO > 58 in 6 weeks    Plan  Planned therapy interventions: joint mobilization, manual therapy, neuromuscular re-education, patient education, postural training, strengthening, stretching, therapeutic exercise, flexibility, functional ROM exercises and home exercise program  Frequency: 2x week  Duration in visits: 12  Duration in weeks: 6  Treatment plan discussed with: patient        Subjective Evaluation    History of Present Illness  Mechanism of injury: Patient reports developing back pain that radiates both legs that started about one month ago  The pain increases with prolonged standing              Not a recurrent problem   Quality of life: good    Pain  Current pain rating: 3  At best pain ratin  At worst pain ratin  Location: central low back   Quality: dull ache  Relieving factors: change in position  Aggravating factors: standing  Progression: no change    Social Support  Steps to enter house: yes  Stairs in house: yes   Lives in: one-story house    Treatments  Current treatment: physical therapy  Patient Goals  Patient goals for therapy: decreased pain, improved balance, increased motion, increased strength, independence with ADLs/IADLs and return to sport/leisure activities          Objective     Concurrent Complaints  Negative for night pain, disturbed sleep, bladder dysfunction, bowel dysfunction, saddle (S4) numbness, history of cancer and history of trauma    Palpation     Right   Tenderness of the iliopsoas  Active Range of Motion     Lumbar   Flexion: 50 degrees   Extension: 5 degrees     Strength/Myotome Testing     Lumbar   Left   Heel walk: normal  Toe walk: normal    Right   Heel walk: normal  Toe walk: normal    Left Hip   Planes of Motion   Flexion: 5    Right Hip   Planes of Motion   Flexion: 5    Left Knee   Flexion: 5  Extension: 5    Right Knee   Flexion: 5  Extension: 5    Left Ankle/Foot   Dorsiflexion: 5  Plantar flexion: 5    Right Ankle/Foot   Dorsiflexion: 5  Plantar flexion: 5             Precautions: HTN, CAD      Manual                                                                                   Exercise Diary  2/11 Meeks day 1 perf                                                                                                                                                                                                                                                                       Modalities                                                         Patient instructed in HEP of Chaka day 1, see scanned doc

## 2020-02-13 ENCOUNTER — OFFICE VISIT (OUTPATIENT)
Dept: PHYSICAL THERAPY | Facility: CLINIC | Age: 70
End: 2020-02-13
Payer: COMMERCIAL

## 2020-02-13 DIAGNOSIS — R29.898 HIP TIGHTNESS: Primary | ICD-10-CM

## 2020-02-13 PROCEDURE — 97110 THERAPEUTIC EXERCISES: CPT

## 2020-02-13 PROCEDURE — 97140 MANUAL THERAPY 1/> REGIONS: CPT

## 2020-02-13 NOTE — PROGRESS NOTES
Daily Note     Today's date: 2020  Patient name: Oliverio Mensah  : 1950  MRN: [id number]  Referring provider: Cayden Arellano DO  Dx:   Encounter Diagnosis     ICD-10-CM    1  Hip tightness R29 898                   Subjective: Patient reports compliance with HEP      Objective: See treatment diary below      Assessment: Tolerated treatment fair  Patient demonstrated fatigue post treatment   NEURAC performed by TAIWO Weeks  Plan: Progress treatment as tolerated         Precautions: HTN, CAD      Manual              B HS/gastroc stretching  performed                                                                   Exercise Diary             Chaka day 1 perf review           NuStep  10min L1           Heels slides  20x           LTR  20x           Supine clamshells  Blue tb x 20           Neurac  perf           Self HS stretching  [id number]                                                                                                                                                                                        Modalities

## 2020-02-15 DIAGNOSIS — E78.5 DYSLIPIDEMIA: ICD-10-CM

## 2020-02-15 DIAGNOSIS — I10 ESSENTIAL HYPERTENSION: ICD-10-CM

## 2020-02-15 DIAGNOSIS — N40.0 BPH WITHOUT URINARY OBSTRUCTION: ICD-10-CM

## 2020-02-15 RX ORDER — LISINOPRIL 20 MG/1
TABLET ORAL
Qty: 90 TABLET | Refills: 3 | Status: SHIPPED | OUTPATIENT
Start: 2020-02-15 | End: 2021-03-31 | Stop reason: SDUPTHER

## 2020-02-17 RX ORDER — TAMSULOSIN HYDROCHLORIDE 0.4 MG/1
CAPSULE ORAL
Qty: 90 CAPSULE | Refills: 0 | Status: SHIPPED | OUTPATIENT
Start: 2020-02-17 | End: 2020-07-06

## 2020-02-18 ENCOUNTER — OFFICE VISIT (OUTPATIENT)
Dept: PHYSICAL THERAPY | Facility: CLINIC | Age: 70
End: 2020-02-18
Payer: COMMERCIAL

## 2020-02-18 DIAGNOSIS — M54.42 ACUTE MIDLINE LOW BACK PAIN WITH BILATERAL SCIATICA: ICD-10-CM

## 2020-02-18 DIAGNOSIS — R29.898 HIP TIGHTNESS: Primary | ICD-10-CM

## 2020-02-18 DIAGNOSIS — M54.41 ACUTE MIDLINE LOW BACK PAIN WITH BILATERAL SCIATICA: ICD-10-CM

## 2020-02-18 PROCEDURE — 97140 MANUAL THERAPY 1/> REGIONS: CPT | Performed by: PHYSICAL THERAPIST

## 2020-02-18 PROCEDURE — 97110 THERAPEUTIC EXERCISES: CPT | Performed by: PHYSICAL THERAPIST

## 2020-02-18 NOTE — PROGRESS NOTES
Daily Note     Today's date: 2020  Patient name: Genesis Ledezma  : 1950  MRN: [id number]  Referring provider: Jackie Castillo DO  Dx:   Encounter Diagnosis     ICD-10-CM    1  Hip tightness R29 898    2  Acute midline low back pain with bilateral sciatica M54 42     M54 41                   Subjective: My hip is getting stronger, doing HEP two times a day  Objective: See treatment diary below      Assessment: Tolerated treatment well  Patient demonstrated fatigue post treatment and exhibited good technique with therapeutic exercises      Plan: Continue per plan of care        Precautions: HTN, CAD      Manual              B HS/gastroc stretching  performed                                                                   Exercise Diary            Chaka day 1 perf review           NuStep  10min L1           Heels slides  20x           LTR  20x           Supine clamshells  Blue tb x 20           Neurac  perf           Self HS stretching  [id number] 5x15s          Neurac supine pelvic lift   2x5          Neurac bridge   2x5          Neurac SDLY Hip ABD   2x5          Neurac SDLY Hip ADD   2x5          Knee to chest   2x5          Piriformis supine stretch   2x5s          Hip ADD ball squeeze   20x                                                                                            Modalities

## 2020-02-19 DIAGNOSIS — E78.5 DYSLIPIDEMIA: ICD-10-CM

## 2020-02-19 RX ORDER — EZETIMIBE 10 MG/1
10 TABLET ORAL EVERY MORNING
Qty: 90 TABLET | Refills: 3 | Status: SHIPPED | OUTPATIENT
Start: 2020-02-19 | End: 2020-03-09

## 2020-02-19 RX ORDER — ATORVASTATIN CALCIUM 40 MG/1
40 TABLET, FILM COATED ORAL
Qty: 90 TABLET | Refills: 3 | Status: SHIPPED | OUTPATIENT
Start: 2020-02-19 | End: 2021-03-01

## 2020-02-20 ENCOUNTER — OFFICE VISIT (OUTPATIENT)
Dept: PHYSICAL THERAPY | Facility: CLINIC | Age: 70
End: 2020-02-20
Payer: COMMERCIAL

## 2020-02-20 DIAGNOSIS — R29.898 HIP TIGHTNESS: Primary | ICD-10-CM

## 2020-02-20 PROCEDURE — 97110 THERAPEUTIC EXERCISES: CPT

## 2020-02-20 PROCEDURE — 97140 MANUAL THERAPY 1/> REGIONS: CPT

## 2020-02-20 NOTE — PROGRESS NOTES
Daily Note     Today's date: 2020  Patient name: Americo Winter  : 1950  MRN: [id number]  Referring provider: Candy Sharif DO  Dx:   Encounter Diagnosis     ICD-10-CM    1  Hip tightness R29 898        Start Time: 830          Subjective: Continued c/o B HS "achiness "      Objective: See treatment diary below      Assessment: Tolerated treatment well  Patient would benefit from continued PT   Patient advised to stand & stretch L/S region during prolonged sitting while visiting mother at [facility]  Plan: Progress treatment as tolerated         Precautions: HTN, CAD      Manual            B HS/gastroc stretching  performed  perf                                                                 Exercise Diary           Chaka day 1 perf review  hep         NuStep  10min L1  10min L1         Heels slides  20x  20x         LTR  20x  20x         Supine clamshells  Blue tb x 20  Blue tb x 20         Neurac  perf  -----         Self HS stretching  [id number] 5x15s reviewed         Neurac supine pelvic lift   2x5 ---         Neurac bridge   2x5 --         Neurac SDLY Hip ABD   2x5 --         Neurac SDLY Hip ADD   2x5 ---         Knee to chest   2x5 --         Piriformis supine stretch   2x5s perf         Hip ADD ball squeeze   20x --         elpidio walk outs    @#6 - 4 x 5 reps         Bridging on red pb    2x10                                                                 Modalities     20         MH to L/S -> HS (seated)    10min

## 2020-02-24 RX ORDER — ATORVASTATIN CALCIUM 40 MG/1
TABLET, FILM COATED ORAL
Qty: 90 TABLET | Refills: 2 | Status: SHIPPED | OUTPATIENT
Start: 2020-02-24 | End: 2021-02-22

## 2020-02-24 RX ORDER — EZETIMIBE 10 MG/1
TABLET ORAL
Qty: 90 TABLET | Refills: 2 | Status: SHIPPED | OUTPATIENT
Start: 2020-02-24 | End: 2020-04-08 | Stop reason: SDUPTHER

## 2020-02-24 RX ORDER — ICOSAPENT ETHYL 1000 MG/1
2 CAPSULE ORAL 2 TIMES DAILY
Qty: 120 CAPSULE | Refills: 11 | Status: SHIPPED | OUTPATIENT
Start: 2020-02-24 | End: 2021-03-22

## 2020-02-25 ENCOUNTER — OFFICE VISIT (OUTPATIENT)
Dept: PHYSICAL THERAPY | Facility: CLINIC | Age: 70
End: 2020-02-25
Payer: COMMERCIAL

## 2020-02-25 DIAGNOSIS — R29.898 HIP TIGHTNESS: Primary | ICD-10-CM

## 2020-02-25 DIAGNOSIS — M54.42 ACUTE MIDLINE LOW BACK PAIN WITH BILATERAL SCIATICA: ICD-10-CM

## 2020-02-25 DIAGNOSIS — M54.41 ACUTE MIDLINE LOW BACK PAIN WITH BILATERAL SCIATICA: ICD-10-CM

## 2020-02-25 PROCEDURE — 97140 MANUAL THERAPY 1/> REGIONS: CPT

## 2020-02-25 PROCEDURE — 97110 THERAPEUTIC EXERCISES: CPT

## 2020-02-25 NOTE — PROGRESS NOTES
Daily Note     Today's date: 2020  Patient name: Britton Harris  : 1950  MRN: [id number]  Referring provider: Yadira Lemus DO  Dx:   Encounter Diagnosis     ICD-10-CM    1  Hip tightness R29 898    2  Acute midline low back pain with bilateral sciatica M54 42     M54 41                   Subjective: The pain and improved rom is significant  Objective: See treatment diary below      Assessment: Tolerated treatment well  Patient demonstrated fatigue post treatment and exhibited good technique with therapeutic exercises      Plan: Continue per plan of care        Precautions: HTN, CAD      Manual            B HS/gastroc stretching  performed  perf                                                                 Exercise Diary  2/11 2/13 2/18 2/20 2/15        Chaka day 1 perf review  hep         NuStep  10min L1  10min L1 15 min        Heels slides  20x  20x         LTR  20x  20x         Supine clamshells  Blue tb x 20  Blue tb x 20         Neurac  perf  -----         Self HS stretching  [id number] 5x15s reviewed         Neurac supine pelvic lift   2x5 --- 2x5        Neurac bridge   2x5 -- 2x5        Neurac SDLY Hip ABD   2x5 -- 2x5        Neurac SDLY Hip ADD   2x5 --- 2x52x5        Knee to chest   2x5 --         Piriformis supine stretch   2x5s perf perf        Hip ADD ball squeeze   20x --         elpidio walk outs    @#6 - 4 x 5 reps #8 4x10        Bridging on red pb    2x10         Hip Circles w strap     2x10                                                   Modalities              MH to L/S -> HS (seated)    10min

## 2020-02-28 ENCOUNTER — OFFICE VISIT (OUTPATIENT)
Dept: PHYSICAL THERAPY | Facility: CLINIC | Age: 70
End: 2020-02-28
Payer: COMMERCIAL

## 2020-02-28 DIAGNOSIS — R29.898 HIP TIGHTNESS: Primary | ICD-10-CM

## 2020-02-28 DIAGNOSIS — M54.41 ACUTE MIDLINE LOW BACK PAIN WITH BILATERAL SCIATICA: ICD-10-CM

## 2020-02-28 DIAGNOSIS — M54.42 ACUTE MIDLINE LOW BACK PAIN WITH BILATERAL SCIATICA: ICD-10-CM

## 2020-02-28 PROCEDURE — 97110 THERAPEUTIC EXERCISES: CPT | Performed by: PHYSICAL THERAPIST

## 2020-02-28 PROCEDURE — 97112 NEUROMUSCULAR REEDUCATION: CPT | Performed by: PHYSICAL THERAPIST

## 2020-03-03 ENCOUNTER — TELEPHONE (OUTPATIENT)
Dept: FAMILY MEDICINE CLINIC | Facility: CLINIC | Age: 70
End: 2020-03-03

## 2020-03-04 NOTE — TELEPHONE ENCOUNTER
Patient called back, he is having sciatic pain for which therapist recommended lab order  I scheduled appointment with Dr Germain Polo 3/9

## 2020-03-05 ENCOUNTER — OFFICE VISIT (OUTPATIENT)
Dept: PHYSICAL THERAPY | Facility: CLINIC | Age: 70
End: 2020-03-05
Payer: COMMERCIAL

## 2020-03-05 DIAGNOSIS — R29.898 HIP TIGHTNESS: Primary | ICD-10-CM

## 2020-03-05 DIAGNOSIS — M54.41 ACUTE MIDLINE LOW BACK PAIN WITH BILATERAL SCIATICA: ICD-10-CM

## 2020-03-05 DIAGNOSIS — M54.42 ACUTE MIDLINE LOW BACK PAIN WITH BILATERAL SCIATICA: ICD-10-CM

## 2020-03-05 PROCEDURE — 97110 THERAPEUTIC EXERCISES: CPT | Performed by: PHYSICAL THERAPIST

## 2020-03-05 NOTE — PROGRESS NOTES
Daily Note     Today's date: 3/5/2020  Patient name: Missy Jacobo  : 1950  MRN: [id number]  Referring provider: Harvey Castellanos DO  Dx:   Encounter Diagnosis     ICD-10-CM    1  Hip tightness R29 898    2  Acute midline low back pain with bilateral sciatica M54 42     M54 41                   Subjective: overall 85% improvement of pain  Objective: See treatment diary below      Assessment: Tolerated treatment well  Patient demonstrated fatigue post treatment and exhibited good technique with therapeutic exercises      Plan: Continue per plan of care        Precautions: HTN, CAD      Manual            B HS/gastroc stretching  performed  perf                                                                 Exercise Diary  2/11 2/13 2/18 2/20 2/15 2/28 3/      Chaka day 1 perf review  hep         NuStep  10min L1  10min L1 15 min 20min 20 m      Heels slides  20x  20x         LTR  20x  20x         Supine clamshells  Blue tb x 20  Blue tb x 20         Neurac  perf  -----         Self HS stretching  [id number] 5x15s reviewed         Neurac supine pelvic lift   2x5 --- 2x5 2x5 2x5      Neurac bridge   2x5 -- 2x5 2x5 2x5      Neurac SDLY Hip ABD   2x5 -- 2x5 2x5 2x5      Neurac SDLY Hip ADD   2x5 --- 2x52x5 2x5 2x5      Knee to chest   2x5 --         Piriformis supine stretch   2x5s perf perf perf       Hip ADD ball squeeze   20x --         elpidio walk outs    @#6 - 4 x 5 reps #8 4x10 #10 4x10 #10 4x10      Bridging on red pb    2x10         Hip Circles w strap     2x10 20x 20x                                                 Modalities     20         MH to L/S -> HS (seated)    10min

## 2020-03-09 ENCOUNTER — OFFICE VISIT (OUTPATIENT)
Dept: FAMILY MEDICINE CLINIC | Facility: CLINIC | Age: 70
End: 2020-03-09
Payer: COMMERCIAL

## 2020-03-09 VITALS
SYSTOLIC BLOOD PRESSURE: 138 MMHG | HEART RATE: 80 BPM | TEMPERATURE: 98.8 F | HEIGHT: 65 IN | DIASTOLIC BLOOD PRESSURE: 80 MMHG | RESPIRATION RATE: 18 BRPM | WEIGHT: 216 LBS | BODY MASS INDEX: 35.99 KG/M2

## 2020-03-09 DIAGNOSIS — I10 ESSENTIAL HYPERTENSION: Primary | ICD-10-CM

## 2020-03-09 DIAGNOSIS — E78.5 DYSLIPIDEMIA: ICD-10-CM

## 2020-03-09 DIAGNOSIS — R73.01 IMPAIRED FASTING GLUCOSE: ICD-10-CM

## 2020-03-09 DIAGNOSIS — M54.32 BILATERAL SCIATICA: ICD-10-CM

## 2020-03-09 DIAGNOSIS — M54.31 BILATERAL SCIATICA: ICD-10-CM

## 2020-03-09 PROCEDURE — 1036F TOBACCO NON-USER: CPT | Performed by: FAMILY MEDICINE

## 2020-03-09 PROCEDURE — 1101F PT FALLS ASSESS-DOCD LE1/YR: CPT | Performed by: FAMILY MEDICINE

## 2020-03-09 PROCEDURE — 4040F PNEUMOC VAC/ADMIN/RCVD: CPT | Performed by: FAMILY MEDICINE

## 2020-03-09 PROCEDURE — 3288F FALL RISK ASSESSMENT DOCD: CPT | Performed by: FAMILY MEDICINE

## 2020-03-09 PROCEDURE — 3008F BODY MASS INDEX DOCD: CPT | Performed by: FAMILY MEDICINE

## 2020-03-09 PROCEDURE — 99214 OFFICE O/P EST MOD 30 MIN: CPT | Performed by: FAMILY MEDICINE

## 2020-03-09 PROCEDURE — 3079F DIAST BP 80-89 MM HG: CPT | Performed by: FAMILY MEDICINE

## 2020-03-09 PROCEDURE — 3075F SYST BP GE 130 - 139MM HG: CPT | Performed by: FAMILY MEDICINE

## 2020-03-09 PROCEDURE — 1160F RVW MEDS BY RX/DR IN RCRD: CPT | Performed by: FAMILY MEDICINE

## 2020-03-09 RX ORDER — FOLIC ACID/MULTIVIT,IRON,MINER .4-18-35
1 TABLET,CHEWABLE ORAL DAILY
COMMUNITY

## 2020-03-09 RX ORDER — CYCLOBENZAPRINE HCL 10 MG
10 TABLET ORAL
Qty: 30 TABLET | Refills: 0 | Status: SHIPPED | OUTPATIENT
Start: 2020-03-09 | End: 2021-02-22

## 2020-03-09 RX ORDER — IBUPROFEN 200 MG
1 CAPSULE ORAL DAILY
COMMUNITY

## 2020-03-09 NOTE — PROGRESS NOTES
Chief Complaint   Patient presents with    Back Pain   f/u multiple chronic problems      Patient ID: Shae Diaz is a 81 y o  male  HPI   pt is seeing for f/u HTN, HLD, IFG -  All stable, labs are UTD -  Was seeing by ortho for b/l sciatica R > L few wks ago-  Started PT -  Feeling much better -  Still has pain worsening in am -  Better with moving around     The following portions of the patient's history were reviewed and updated as appropriate: allergies, current medications, past family history, past medical history, past social history, past surgical history and problem list     Review of Systems   Constitutional: Negative  Respiratory: Negative  Cardiovascular: Negative  Gastrointestinal: Negative  Genitourinary: Negative  Musculoskeletal: Positive for back pain  Negative for joint swelling  Skin: Negative  Neurological: Negative  Psychiatric/Behavioral: Negative          Current Outpatient Medications   Medication Sig Dispense Refill    aspirin (ASPIRIN LOW DOSE) 81 MG tablet Take 1 tablet by mouth daily Last dose is 8/7/18       atorvastatin (LIPITOR) 40 mg tablet TAKE 1 TABLET DAILY AT  BEDTIME 90 tablet 2    atorvastatin (LIPITOR) 40 mg tablet Take 1 tablet (40 mg total) by mouth daily at bedtime 90 tablet 3    Biotin 02598 MCG TABS Take 5,000 mg by mouth daily      calcium citrate (CALCITRATE) 950 MG tablet Take 1 tablet by mouth daily      calcium citrate-vitamin D (CITRACAL+D) 315-200 MG-UNIT per tablet Take 1 tablet by mouth 2 (two) times a day      Cholecalciferol (VITAMIN D3) 2000 units capsule Take 2,000 Units by mouth daily        Coenzyme Q10 (CO Q-10) 400 MG CAPS Take 1 capsule by mouth Daily      cycloSPORINE (RESTASIS) 0 05 % ophthalmic emulsion Administer to both eyes every 12 (twelve) hours        ezetimibe (ZETIA) 10 mg tablet TAKE 1 TABLET BY MOUTH  EVERY MORNING 90 tablet 2    Icosapent Ethyl (Vascepa) 1 g CAPS Take 2 capsules (2 g total) by mouth 2 (two) times a day 120 capsule 11    lisinopril (ZESTRIL) 20 mg tablet TAKE 1 TABLET BY MOUTH  DAILY 90 tablet 3    metoprolol succinate (TOPROL-XL) 25 mg 24 hr tablet TAKE 1 TABLET DAILY 90 tablet 3    Misc Natural Products (OSTEO BI-FLEX/5-LOXIN ADVANCED) TABS Take by mouth daily      multivitamin-iron-minerals-folic acid (CENTRUM) chewable tablet Chew 1 tablet daily      Naproxen Sodium (ALEVE) 220 MG CAPS Take by mouth      tamsulosin (FLOMAX) 0 4 mg TAKE 1 CAPSULE BY MOUTH  DAILY AT BEDTIME 90 capsule 0    TURMERIC PO Take by mouth       No current facility-administered medications for this visit  Objective:    /80   Pulse 80   Temp 98 8 °F (37 1 °C) (Tympanic)   Resp 18   Ht 5' 5" (1 651 m)   Wt 98 kg (216 lb)   BMI 35 94 kg/m²        Physical Exam   Constitutional: He is oriented to person, place, and time  No distress  Cardiovascular: Normal rate and regular rhythm  Pulmonary/Chest: Effort normal  No respiratory distress  Musculoskeletal: He exhibits no edema  Lumbar back: He exhibits decreased range of motion and tenderness  He exhibits no bony tenderness and no swelling  Neurological: He is alert and oriented to person, place, and time  No cranial nerve deficit  Psychiatric: He has a normal mood and affect  Labs in chart were reviewed  Assessment/Plan:         Diagnoses and all orders for this visit:    Essential hypertension    Bilateral sciatica  -     cyclobenzaprine (FLEXERIL) 10 mg tablet; Take 1 tablet (10 mg total) by mouth daily at bedtime    Dyslipidemia  -     Comprehensive metabolic panel; Future  -     Lipid panel; Future    Impaired fasting glucose  -     Hemoglobin A1C; Future    Other orders  -     calcium citrate (CALCITRATE) 950 MG tablet; Take 1 tablet by mouth daily  -     multivitamin-iron-minerals-folic acid (CENTRUM) chewable tablet; Chew 1 tablet daily            BMI Counseling: Body mass index is 35 94 kg/m²   Discussed the patient's BMI with him  The BMI is above normal  Nutrition recommendations include reducing portion sizes, decreasing overall calorie intake, 3-5 servings of fruits/vegetables daily, reducing fast food intake, consuming healthier snacks and decreasing soda and/or juice intake  Exercise recommendations include exercising 3-5 times per week         rto in 3 m for Lizbeth Coleman MD

## 2020-03-24 ENCOUNTER — NEW PATIENT (OUTPATIENT)
Dept: URBAN - METROPOLITAN AREA CLINIC 27 | Facility: CLINIC | Age: 70
End: 2020-03-24

## 2020-03-24 DIAGNOSIS — H43.813: ICD-10-CM

## 2020-03-24 DIAGNOSIS — H35.371: ICD-10-CM

## 2020-03-24 DIAGNOSIS — H43.22: ICD-10-CM

## 2020-03-24 DIAGNOSIS — H25.9: ICD-10-CM

## 2020-03-24 DIAGNOSIS — H52.13: ICD-10-CM

## 2020-03-24 PROCEDURE — 92004 COMPRE OPH EXAM NEW PT 1/>: CPT

## 2020-03-24 PROCEDURE — 92134 CPTRZ OPH DX IMG PST SGM RTA: CPT

## 2020-03-24 PROCEDURE — 92201 OPSCPY EXTND RTA DRAW UNI/BI: CPT

## 2020-03-24 ASSESSMENT — VISUAL ACUITY
OS_PH: 20/25-2
OS_CC: 20/30-1
OD_CC: 20/20-1

## 2020-03-24 ASSESSMENT — TONOMETRY
OS_IOP_MMHG: 19
OD_IOP_MMHG: 20

## 2020-04-08 DIAGNOSIS — E78.5 DYSLIPIDEMIA: ICD-10-CM

## 2020-04-08 RX ORDER — EZETIMIBE 10 MG/1
10 TABLET ORAL EVERY MORNING
Qty: 90 TABLET | Refills: 3 | Status: SHIPPED | OUTPATIENT
Start: 2020-04-08 | End: 2021-03-16

## 2020-07-03 DIAGNOSIS — N40.0 BPH WITHOUT URINARY OBSTRUCTION: ICD-10-CM

## 2020-07-06 RX ORDER — TAMSULOSIN HYDROCHLORIDE 0.4 MG/1
CAPSULE ORAL
Qty: 90 CAPSULE | Refills: 0 | Status: SHIPPED | OUTPATIENT
Start: 2020-07-06 | End: 2020-09-17

## 2020-08-11 DIAGNOSIS — I25.10 CORONARY ARTERY DISEASE INVOLVING NATIVE CORONARY ARTERY OF NATIVE HEART WITHOUT ANGINA PECTORIS: ICD-10-CM

## 2020-08-12 RX ORDER — METOPROLOL SUCCINATE 25 MG/1
TABLET, EXTENDED RELEASE ORAL
Qty: 90 TABLET | Refills: 3 | Status: SHIPPED | OUTPATIENT
Start: 2020-08-12 | End: 2021-08-28

## 2020-09-17 DIAGNOSIS — N40.0 BPH WITHOUT URINARY OBSTRUCTION: ICD-10-CM

## 2020-09-17 RX ORDER — TAMSULOSIN HYDROCHLORIDE 0.4 MG/1
CAPSULE ORAL
Qty: 90 CAPSULE | Refills: 0 | Status: SHIPPED | OUTPATIENT
Start: 2020-09-17 | End: 2020-12-26

## 2020-09-22 ENCOUNTER — TELEPHONE (OUTPATIENT)
Dept: FAMILY MEDICINE CLINIC | Facility: CLINIC | Age: 70
End: 2020-09-22

## 2020-09-22 NOTE — TELEPHONE ENCOUNTER
Dr Dodie Logan    Patient says lab order he was given in March   Please write new lab order and call when ready for

## 2020-09-24 DIAGNOSIS — R73.01 IMPAIRED FASTING GLUCOSE: ICD-10-CM

## 2020-09-24 DIAGNOSIS — E78.5 DYSLIPIDEMIA: Primary | ICD-10-CM

## 2020-09-24 NOTE — TELEPHONE ENCOUNTER
Called patient and spoke with is wife  Patient not home now  She will have him  order and call us back to schedule AWV

## 2020-10-08 LAB
ALBUMIN SERPL-MCNC: 4.6 G/DL (ref 3.8–4.8)
ALBUMIN/GLOB SERPL: 2 {RATIO} (ref 1.2–2.2)
ALP SERPL-CCNC: 48 IU/L (ref 39–117)
ALT SERPL-CCNC: 38 IU/L (ref 0–44)
AST SERPL-CCNC: 39 IU/L (ref 0–40)
BILIRUB SERPL-MCNC: 0.6 MG/DL (ref 0–1.2)
BUN SERPL-MCNC: 16 MG/DL (ref 8–27)
BUN/CREAT SERPL: 17 (ref 10–24)
CALCIUM SERPL-MCNC: 9.7 MG/DL (ref 8.6–10.2)
CHLORIDE SERPL-SCNC: 100 MMOL/L (ref 96–106)
CHOLEST SERPL-MCNC: 132 MG/DL (ref 100–199)
CO2 SERPL-SCNC: 26 MMOL/L (ref 20–29)
CREAT SERPL-MCNC: 0.94 MG/DL (ref 0.76–1.27)
GLOBULIN SER-MCNC: 2.3 G/DL (ref 1.5–4.5)
GLUCOSE SERPL-MCNC: 106 MG/DL (ref 65–99)
HBA1C MFR BLD: 5.8 % (ref 4.8–5.6)
HDLC SERPL-MCNC: 43 MG/DL
LDLC SERPL CALC-MCNC: 71 MG/DL (ref 0–99)
MICRODELETION SYND BLD/T FISH: NORMAL
POTASSIUM SERPL-SCNC: 5.3 MMOL/L (ref 3.5–5.2)
PROT SERPL-MCNC: 6.9 G/DL (ref 6–8.5)
SL AMB EGFR AFRICAN AMERICAN: 97 ML/MIN/1.73
SL AMB EGFR NON AFRICAN AMERICAN: 84 ML/MIN/1.73
SL AMB VLDL CHOLESTEROL CALC: 18 MG/DL (ref 5–40)
SODIUM SERPL-SCNC: 139 MMOL/L (ref 134–144)
TRIGL SERPL-MCNC: 96 MG/DL (ref 0–149)

## 2020-10-13 ENCOUNTER — TELEPHONE (OUTPATIENT)
Dept: FAMILY MEDICINE CLINIC | Facility: CLINIC | Age: 70
End: 2020-10-13

## 2020-12-02 ENCOUNTER — VBI (OUTPATIENT)
Dept: ADMINISTRATIVE | Facility: OTHER | Age: 70
End: 2020-12-02

## 2020-12-14 ENCOUNTER — TELEPHONE (OUTPATIENT)
Dept: FAMILY MEDICINE CLINIC | Facility: CLINIC | Age: 70
End: 2020-12-14

## 2020-12-14 ENCOUNTER — CLINICAL SUPPORT (OUTPATIENT)
Dept: FAMILY MEDICINE CLINIC | Facility: CLINIC | Age: 70
End: 2020-12-14
Payer: COMMERCIAL

## 2020-12-14 DIAGNOSIS — Z23 NEED FOR INFLUENZA VACCINATION: Primary | ICD-10-CM

## 2020-12-14 PROCEDURE — G0008 ADMIN INFLUENZA VIRUS VAC: HCPCS

## 2020-12-14 PROCEDURE — 90662 IIV NO PRSV INCREASED AG IM: CPT

## 2020-12-26 DIAGNOSIS — N40.0 BPH WITHOUT URINARY OBSTRUCTION: ICD-10-CM

## 2020-12-26 RX ORDER — TAMSULOSIN HYDROCHLORIDE 0.4 MG/1
CAPSULE ORAL
Qty: 90 CAPSULE | Refills: 3 | Status: SHIPPED | OUTPATIENT
Start: 2020-12-26 | End: 2022-01-19

## 2021-01-11 ENCOUNTER — TELEPHONE (OUTPATIENT)
Dept: CARDIOLOGY CLINIC | Facility: CLINIC | Age: 71
End: 2021-01-11

## 2021-01-15 ENCOUNTER — OFFICE VISIT (OUTPATIENT)
Dept: OBGYN CLINIC | Facility: CLINIC | Age: 71
End: 2021-01-15
Payer: COMMERCIAL

## 2021-01-15 VITALS
HEART RATE: 67 BPM | WEIGHT: 219.6 LBS | BODY MASS INDEX: 36.59 KG/M2 | HEIGHT: 65 IN | DIASTOLIC BLOOD PRESSURE: 75 MMHG | SYSTOLIC BLOOD PRESSURE: 126 MMHG

## 2021-01-15 DIAGNOSIS — M65.341 TRIGGER FINGER, RIGHT RING FINGER: Primary | ICD-10-CM

## 2021-01-15 PROCEDURE — 3074F SYST BP LT 130 MM HG: CPT | Performed by: ORTHOPAEDIC SURGERY

## 2021-01-15 PROCEDURE — 99214 OFFICE O/P EST MOD 30 MIN: CPT | Performed by: ORTHOPAEDIC SURGERY

## 2021-01-15 PROCEDURE — 1160F RVW MEDS BY RX/DR IN RCRD: CPT | Performed by: ORTHOPAEDIC SURGERY

## 2021-01-15 PROCEDURE — 1036F TOBACCO NON-USER: CPT | Performed by: ORTHOPAEDIC SURGERY

## 2021-01-15 PROCEDURE — 20550 NJX 1 TENDON SHEATH/LIGAMENT: CPT | Performed by: ORTHOPAEDIC SURGERY

## 2021-01-15 PROCEDURE — 3008F BODY MASS INDEX DOCD: CPT | Performed by: ORTHOPAEDIC SURGERY

## 2021-01-15 PROCEDURE — 3078F DIAST BP <80 MM HG: CPT | Performed by: ORTHOPAEDIC SURGERY

## 2021-01-15 RX ORDER — TRIAMCINOLONE ACETONIDE 40 MG/ML
40 INJECTION, SUSPENSION INTRA-ARTICULAR; INTRAMUSCULAR
Status: COMPLETED | OUTPATIENT
Start: 2021-01-15 | End: 2021-01-15

## 2021-01-15 RX ORDER — BUPIVACAINE HYDROCHLORIDE 2.5 MG/ML
0.5 INJECTION, SOLUTION INFILTRATION; PERINEURAL
Status: COMPLETED | OUTPATIENT
Start: 2021-01-15 | End: 2021-01-15

## 2021-01-15 RX ADMIN — TRIAMCINOLONE ACETONIDE 40 MG: 40 INJECTION, SUSPENSION INTRA-ARTICULAR; INTRAMUSCULAR at 11:43

## 2021-01-15 RX ADMIN — BUPIVACAINE HYDROCHLORIDE 0.5 ML: 2.5 INJECTION, SOLUTION INFILTRATION; PERINEURAL at 11:43

## 2021-01-15 NOTE — PROGRESS NOTES
Assessment/Plan:  1  Trigger finger, right ring finger  Hand/upper extremity injection: R ring A1       Scribe Attestation    I,:  Andrea Byrd am acting as a scribe while in the presence of the attending physician :       I,:  Nasreen Osborn MD personally performed the services described in this documentation    as scribed in my presence :         Hand/upper extremity injection: R ring A1  Universal Protocol:  Consent: Verbal consent obtained  Written consent not obtained  Consent given by: patient  Time out: Immediately prior to procedure a "time out" was called to verify the correct patient, procedure, equipment, support staff and site/side marked as required  Patient understanding: patient states understanding of the procedure being performed  Patient consent: the patient's understanding of the procedure matches consent given  Site marked: the operative site was marked  Patient identity confirmed: verbally with patient    Supporting Documentation  Indications: tendon swelling   Procedure Details  Condition:trigger finger Location: ring finger - R ring A1   Preparation: Patient was prepped and draped in the usual sterile fashion  Needle size: 25 G  Ultrasound guidance: no  Approach: volar  Medications administered: 0 5 mL bupivacaine 0 25 %; 40 mg triamcinolone acetonide 40 mg/mL    Patient tolerance: patient tolerated the procedure well with no immediate complications  Dressing:  Sterile dressing applied             Manny Cox has a triggering right ring finger  I explained the condition in detail as well as the mechanism  We discussed that typically this responds well to initial cortisone steroid injection  He was amenable to this treatment and tolerated the procedure well  If he continues to experience triggering over the next month he should return to see me will discuss further treatment options such as surgery to release the pulley      Subjective:   Sohan Kirkpatrick is a 78 y o  male who presents to the office today for evaluation of a triggering right ring finger  Lyle Salcedo states that the finger has been clicking and catching for well over month or 2 in the right hand  Grasping objects will cause an increase in his symptoms  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  He is noticing that this has become somewhat difficult  He is questioning what could be performed to help correct this triggering finger  He denies distal paresthesias  Review of Systems   Constitutional: Negative for chills, fever and unexpected weight change  HENT: Negative for hearing loss, nosebleeds and sore throat  Eyes: Negative for pain, redness and visual disturbance  Respiratory: Negative for cough, shortness of breath and wheezing  Cardiovascular: Negative for chest pain, palpitations and leg swelling  Gastrointestinal: Negative for abdominal pain, nausea and vomiting  Endocrine: Negative for polyphagia and polyuria  Genitourinary: Negative for dysuria and hematuria  Musculoskeletal:        See HPI   Skin: Negative for rash and wound  Neurological: Negative for dizziness, numbness and headaches  Psychiatric/Behavioral: Negative for decreased concentration and suicidal ideas  The patient is not nervous/anxious            Past Medical History:   Diagnosis Date    Abnormal electrocardiogram     last assessed 12/19/13    Arthritis     hand and forearm    Chest pain     Coronary artery disease     Enthesopathy of elbow 02/15/2006    Epididymo-orchitis 07/10/2012    Hyperlipidemia     Hypertension     Obesity     Voice disturbance 05/27/2008       Past Surgical History:   Procedure Laterality Date    ANGIOPLASTY  2013    one stent RCA    APPENDECTOMY      CARPAL TUNNEL RELEASE Left 2018    CORONARY ANGIOPLASTY      CORONARY ANGIOPLASTY WITH STENT PLACEMENT      CORONARY ANGIOPLASTY WITH STENT PLACEMENT  2013    AL REVISE MEDIAN N/CARPAL TUNNEL SURG Left 8/13/2018 Procedure: RELEASE CARPAL TUNNEL;  Surgeon: Reuben Salazar MD;  Location: [facility];  Service: Orthopedics    PA REVISE MEDIAN N/CARPAL TUNNEL SURG Right 9/19/2019    Procedure: RELEASE CARPAL TUNNEL;  Surgeon: Reuben Salazar MD;  Location: [facility];  Service: Orthopedics       Family History   Problem Relation Age of Onset    Heart disease Mother     Hypertension Mother     Heart disease Father         CHF    Arthritis Brother     Obesity Brother     Hypertension Brother     No Known Problems Maternal Aunt     No Known Problems Maternal Uncle     No Known Problems Paternal Aunt     No Known Problems Paternal Uncle     No Known Problems Maternal Grandmother     No Known Problems Maternal Grandfather     No Known Problems Paternal Grandmother     No Known Problems Paternal Grandfather        Social History     Occupational History    Not on file   Tobacco Use    Smoking status: Never Smoker    Smokeless tobacco: Never Used   Substance and Sexual Activity    Alcohol use: No    Drug use: No    Sexual activity: Not on file         Current Outpatient Medications:     aspirin (ASPIRIN LOW DOSE) 81 MG tablet, Take 1 tablet by mouth daily Last dose is 8/7/18 , Disp: , Rfl:     atorvastatin (LIPITOR) 40 mg tablet, Take 1 tablet (40 mg total) by mouth daily at bedtime, Disp: 90 tablet, Rfl: 3    Biotin 37384 MCG TABS, Take 5,000 mg by mouth daily, Disp: , Rfl:     calcium citrate (CALCITRATE) 950 MG tablet, Take 1 tablet by mouth daily, Disp: , Rfl:     calcium citrate-vitamin D (CITRACAL+D) 315-200 MG-UNIT per tablet, Take 1 tablet by mouth 2 (two) times a day, Disp: , Rfl:     Cholecalciferol (VITAMIN D3) 2000 units capsule, Take 2,000 Units by mouth daily  , Disp: , Rfl:     Coenzyme Q10 (CO Q-10) 400 MG CAPS, Take 1 capsule by mouth Daily, Disp: , Rfl:     cycloSPORINE (RESTASIS) 0 05 % ophthalmic emulsion, Administer to both eyes every 12 (twelve) hours  , Disp: , Rfl:    ezetimibe (ZETIA) 10 mg tablet, Take 1 tablet (10 mg total) by mouth every morning, Disp: 90 tablet, Rfl: 3    Icosapent Ethyl (Vascepa) 1 g CAPS, Take 2 capsules (2 g total) by mouth 2 (two) times a day, Disp: 120 capsule, Rfl: 11    lisinopril (ZESTRIL) 20 mg tablet, TAKE 1 TABLET BY MOUTH  DAILY, Disp: 90 tablet, Rfl: 3    metoprolol succinate (TOPROL-XL) 25 mg 24 hr tablet, TAKE 1 TABLET BY MOUTH  DAILY, Disp: 90 tablet, Rfl: 3    Misc Natural Products (OSTEO BI-FLEX/5-LOXIN ADVANCED) TABS, Take by mouth daily, Disp: , Rfl:     multivitamin-iron-minerals-folic acid (CENTRUM) chewable tablet, Chew 1 tablet daily, Disp: , Rfl:     Naproxen Sodium (ALEVE) 220 MG CAPS, Take by mouth, Disp: , Rfl:     tamsulosin (FLOMAX) 0 4 mg, TAKE 1 CAPSULE BY MOUTH  DAILY AT BEDTIME, Disp: 90 capsule, Rfl: 3    TURMERIC PO, Take by mouth, Disp: , Rfl:     atorvastatin (LIPITOR) 40 mg tablet, TAKE 1 TABLET DAILY AT  BEDTIME (Patient not taking: Reported on 1/15/2021), Disp: 90 tablet, Rfl: 2    cyclobenzaprine (FLEXERIL) 10 mg tablet, Take 1 tablet (10 mg total) by mouth daily at bedtime (Patient not taking: Reported on 1/15/2021), Disp: 30 tablet, Rfl: 0    No Known Allergies    Objective:  Vitals:    01/15/21 1111   BP: 126/75   Pulse: 67       Right Hand Exam     Tenderness   The patient is experiencing tenderness in the palmar area (Ring A1 pulley)  Range of Motion   Hand   MP Rin   PIP Rin   DIP Rin     Other   Erythema: absent  Scars: absent  Sensation: normal  Pulse: present    Comments:  Palpable triggering right ring finger            Physical Exam  Vitals signs reviewed  Constitutional:       Appearance: He is well-developed  HENT:      Head: Normocephalic and atraumatic  Eyes:      General:         Right eye: No discharge  Left eye: No discharge  Conjunctiva/sclera: Conjunctivae normal    Neck:      Musculoskeletal: Normal range of motion and neck supple     Cardiovascular: Rate and Rhythm: Regular rhythm  Pulmonary:      Effort: Pulmonary effort is normal  No respiratory distress  Skin:     General: Skin is warm and dry  Neurological:      Mental Status: He is alert and oriented to person, place, and time     Psychiatric:         Behavior: Behavior normal

## 2021-02-11 ENCOUNTER — IMMUNIZATIONS (OUTPATIENT)
Dept: FAMILY MEDICINE CLINIC | Facility: HOSPITAL | Age: 71
End: 2021-02-11

## 2021-02-11 DIAGNOSIS — Z23 ENCOUNTER FOR IMMUNIZATION: Primary | ICD-10-CM

## 2021-02-11 PROCEDURE — 91301 SARS-COV-2 / COVID-19 MRNA VACCINE (MODERNA) 100 MCG: CPT

## 2021-02-11 PROCEDURE — 0011A SARS-COV-2 / COVID-19 MRNA VACCINE (MODERNA) 100 MCG: CPT

## 2021-02-22 ENCOUNTER — OFFICE VISIT (OUTPATIENT)
Dept: FAMILY MEDICINE CLINIC | Facility: CLINIC | Age: 71
End: 2021-02-22
Payer: COMMERCIAL

## 2021-02-22 VITALS
HEART RATE: 72 BPM | WEIGHT: 215 LBS | TEMPERATURE: 98.1 F | BODY MASS INDEX: 35.82 KG/M2 | RESPIRATION RATE: 16 BRPM | SYSTOLIC BLOOD PRESSURE: 122 MMHG | HEIGHT: 65 IN | DIASTOLIC BLOOD PRESSURE: 70 MMHG

## 2021-02-22 DIAGNOSIS — Z00.00 MEDICARE ANNUAL WELLNESS VISIT, SUBSEQUENT: ICD-10-CM

## 2021-02-22 DIAGNOSIS — E66.09 CLASS 2 OBESITY DUE TO EXCESS CALORIES WITHOUT SERIOUS COMORBIDITY WITH BODY MASS INDEX (BMI) OF 35.0 TO 35.9 IN ADULT: ICD-10-CM

## 2021-02-22 DIAGNOSIS — R73.03 PREDIABETES: ICD-10-CM

## 2021-02-22 DIAGNOSIS — I25.10 CAD IN NATIVE ARTERY: ICD-10-CM

## 2021-02-22 DIAGNOSIS — N40.0 ENLARGED PROSTATE WITHOUT LOWER URINARY TRACT SYMPTOMS (LUTS): ICD-10-CM

## 2021-02-22 DIAGNOSIS — I10 ESSENTIAL HYPERTENSION: Primary | ICD-10-CM

## 2021-02-22 DIAGNOSIS — E78.5 DYSLIPIDEMIA: ICD-10-CM

## 2021-02-22 PROBLEM — E66.812 CLASS 2 OBESITY DUE TO EXCESS CALORIES WITHOUT SERIOUS COMORBIDITY WITH BODY MASS INDEX (BMI) OF 35.0 TO 35.9 IN ADULT: Status: ACTIVE | Noted: 2021-02-22

## 2021-02-22 PROCEDURE — 1101F PT FALLS ASSESS-DOCD LE1/YR: CPT | Performed by: FAMILY MEDICINE

## 2021-02-22 PROCEDURE — 1125F AMNT PAIN NOTED PAIN PRSNT: CPT | Performed by: FAMILY MEDICINE

## 2021-02-22 PROCEDURE — 3008F BODY MASS INDEX DOCD: CPT | Performed by: FAMILY MEDICINE

## 2021-02-22 PROCEDURE — 99214 OFFICE O/P EST MOD 30 MIN: CPT | Performed by: FAMILY MEDICINE

## 2021-02-22 PROCEDURE — 1036F TOBACCO NON-USER: CPT | Performed by: FAMILY MEDICINE

## 2021-02-22 PROCEDURE — 3725F SCREEN DEPRESSION PERFORMED: CPT | Performed by: FAMILY MEDICINE

## 2021-02-22 PROCEDURE — 3288F FALL RISK ASSESSMENT DOCD: CPT | Performed by: FAMILY MEDICINE

## 2021-02-22 PROCEDURE — 1160F RVW MEDS BY RX/DR IN RCRD: CPT | Performed by: FAMILY MEDICINE

## 2021-02-22 PROCEDURE — 1170F FXNL STATUS ASSESSED: CPT | Performed by: FAMILY MEDICINE

## 2021-02-22 PROCEDURE — G0439 PPPS, SUBSEQ VISIT: HCPCS | Performed by: FAMILY MEDICINE

## 2021-02-22 NOTE — PROGRESS NOTES
Chief Complaint   Patient presents with    Medicare Wellness Visit    Follow-up     multiple chronic conditions         Patient ID: Shae Diaz is a 78 y o  male  HPI  Pt is seeing for f/u HTN, HLD, CAD, IFG, Obesity, BPH -  All stable,  Under cardiologist care -  Taking meds as Rx -  Does not follow low carbs diet, no regular exercises     The following portions of the patient's history were reviewed and updated as appropriate: allergies, current medications, past family history, past medical history, past social history, past surgical history and problem list     Review of Systems   Constitutional: Negative  Respiratory: Negative  Cardiovascular: Negative  Gastrointestinal: Negative  Genitourinary: Negative  Musculoskeletal: Negative  Skin: Negative  Neurological: Negative          Current Outpatient Medications   Medication Sig Dispense Refill    aspirin (ASPIRIN LOW DOSE) 81 MG tablet Take 1 tablet by mouth daily Last dose is 8/7/18       atorvastatin (LIPITOR) 40 mg tablet Take 1 tablet (40 mg total) by mouth daily at bedtime 90 tablet 3    Biotin 36293 MCG TABS Take 5,000 mg by mouth daily      calcium citrate (CALCITRATE) 950 MG tablet Take 1 tablet by mouth daily      calcium citrate-vitamin D (CITRACAL+D) 315-200 MG-UNIT per tablet Take 1 tablet by mouth 2 (two) times a day      Cholecalciferol (VITAMIN D3) 2000 units capsule Take 2,000 Units by mouth daily        Coenzyme Q10 (CO Q-10) 400 MG CAPS Take 1 capsule by mouth Daily      cycloSPORINE (RESTASIS) 0 05 % ophthalmic emulsion Administer to both eyes every 12 (twelve) hours        ezetimibe (ZETIA) 10 mg tablet Take 1 tablet (10 mg total) by mouth every morning 90 tablet 3    Icosapent Ethyl (Vascepa) 1 g CAPS Take 2 capsules (2 g total) by mouth 2 (two) times a day 120 capsule 11    lisinopril (ZESTRIL) 20 mg tablet TAKE 1 TABLET BY MOUTH  DAILY 90 tablet 3    metoprolol succinate (TOPROL-XL) 25 mg 24 hr tablet TAKE 1 TABLET BY MOUTH  DAILY 90 tablet 3    Misc Natural Products (OSTEO BI-FLEX/5-LOXIN ADVANCED) TABS Take by mouth daily      multivitamin-iron-minerals-folic acid (CENTRUM) chewable tablet Chew 1 tablet daily      Naproxen Sodium (ALEVE) 220 MG CAPS Take by mouth      tamsulosin (FLOMAX) 0 4 mg TAKE 1 CAPSULE BY MOUTH  DAILY AT BEDTIME 90 capsule 3    TURMERIC PO Take by mouth       No current facility-administered medications for this visit  Objective:    /70   Pulse 72   Temp 98 1 °F (36 7 °C) (Tympanic)   Resp 16   Ht 5' 5" (1 651 m)   Wt 97 5 kg (215 lb)   BMI 35 78 kg/m²        Physical Exam  Constitutional:       Appearance: He is obese  Cardiovascular:      Rate and Rhythm: Normal rate and regular rhythm  Heart sounds: No murmur  Pulmonary:      Effort: Pulmonary effort is normal  No respiratory distress  Breath sounds: No wheezing, rhonchi or rales  Musculoskeletal:      Right lower leg: No edema  Left lower leg: No edema  Neurological:      General: No focal deficit present  Mental Status: He is alert and oriented to person, place, and time  Psychiatric:         Mood and Affect: Mood normal            Labs in chart were reviewed  Assessment/Plan:         Diagnoses and all orders for this visit:    Essential hypertension  -     Comprehensive metabolic panel; Future    Medicare annual wellness visit, subsequent    Dyslipidemia  -     Lipid panel; Future    CAD in native artery    Class 2 obesity due to excess calories without serious comorbidity with body mass index (BMI) of 35 0 to 35 9 in adult    Enlarged prostate without lower urinary tract symptoms (luts)    Prediabetes  -     Hemoglobin A1C; Future  Low carb diet  Exercises advised           BMI Counseling: Body mass index is 35 78 kg/m²  Discussed the patient's BMI with him   The BMI is above normal  Nutrition recommendations include reducing portion sizes, decreasing overall calorie intake, 3-5 servings of fruits/vegetables daily, reducing fast food intake and consuming healthier snacks  Exercise recommendations include exercising 3-5 times per week           rto in 6 m         Lili Abel MD

## 2021-02-22 NOTE — PATIENT INSTRUCTIONS

## 2021-02-22 NOTE — PROGRESS NOTES
Assessment and Plan:     Problem List Items Addressed This Visit        Cardiovascular and Mediastinum    Essential hypertension    CAD in native artery       Other    Dyslipidemia    Class 2 obesity due to excess calories without serious comorbidity with body mass index (BMI) of 35 0 to 35 9 in adult      Other Visit Diagnoses     Medicare annual wellness visit, subsequent    -  Primary           Preventive health issues were discussed with patient, and age appropriate screening tests were ordered as noted in patient's After Visit Summary  Personalized health advice and appropriate referrals for health education or preventive services given if needed, as noted in patient's After Visit Summary       History of Present Illness:     Patient presents for Medicare Annual Wellness visit    Patient Care Team:  Stella Escalera MD as PCP - General  MD Mary Ellen Couch,      Problem List:     Patient Active Problem List   Diagnosis    Essential hypertension    CAD in native artery    Obesity (BMI 35 0-39 9 without comorbidity)    Enlarged prostate without lower urinary tract symptoms (luts)    Ischemic heart disease, chronic    Dyslipidemia    S/P right coronary artery (RCA) stent placement    Carpal tunnel syndrome on right    Class 2 obesity due to excess calories without serious comorbidity with body mass index (BMI) of 35 0 to 35 9 in adult      Past Medical and Surgical History:     Past Medical History:   Diagnosis Date    Abnormal electrocardiogram     last assessed 12/19/13    Arthritis     hand and forearm    Chest pain     Coronary artery disease     Enthesopathy of elbow 02/15/2006    Epididymo-orchitis 07/10/2012    Hyperlipidemia     Hypertension     Obesity     Voice disturbance 05/27/2008     Past Surgical History:   Procedure Laterality Date    ANGIOPLASTY  2013    one stent RCA    APPENDECTOMY      CARPAL TUNNEL RELEASE Left 2018    CORONARY ANGIOPLASTY      CORONARY ANGIOPLASTY WITH STENT PLACEMENT      CORONARY ANGIOPLASTY WITH STENT PLACEMENT  2013    ID REVISE MEDIAN N/CARPAL TUNNEL SURG Left 8/13/2018    Procedure: RELEASE CARPAL TUNNEL;  Surgeon: Keara Whitlock MD;  Location: [facility];  Service: Orthopedics    ID REVISE MEDIAN N/CARPAL TUNNEL SURG Right 9/19/2019    Procedure: RELEASE CARPAL TUNNEL;  Surgeon: Keara Whitlock MD;  Location: [facility];  Service: Orthopedics      Family History:     Family History   Problem Relation Age of Onset    Heart disease Mother     Hypertension Mother     Heart disease Father         CHF    Arthritis Brother     Obesity Brother     Hypertension Brother     No Known Problems Maternal Aunt     No Known Problems Maternal Uncle     No Known Problems Paternal Aunt     No Known Problems Paternal Uncle     No Known Problems Maternal Grandmother     No Known Problems Maternal Grandfather     No Known Problems Paternal Grandmother     No Known Problems Paternal Grandfather       Social History:        Social History     Socioeconomic History    Marital status: /Civil Union     Spouse name: None    Number of children: None    Years of education: None    Highest education level: None   Occupational History    None   Social Needs    Financial resource strain: None    Food insecurity     Worry: None     Inability: None    Transportation needs     Medical: None     Non-medical: None   Tobacco Use    Smoking status: Never Smoker    Smokeless tobacco: Never Used   Substance and Sexual Activity    Alcohol use: No    Drug use: No    Sexual activity: None   Lifestyle    Physical activity     Days per week: None     Minutes per session: None    Stress: None   Relationships    Social connections     Talks on phone: None     Gets together: None     Attends Hinduism service: None     Active member of club or organization: None     Attends meetings of clubs or organizations: None     Relationship status: None    Intimate partner violence     Fear of current or ex partner: None     Emotionally abused: None     Physically abused: None     Forced sexual activity: None   Other Topics Concern    None   Social History Narrative    None      Medications and Allergies:     Current Outpatient Medications   Medication Sig Dispense Refill    aspirin (ASPIRIN LOW DOSE) 81 MG tablet Take 1 tablet by mouth daily Last dose is 8/7/18       atorvastatin (LIPITOR) 40 mg tablet Take 1 tablet (40 mg total) by mouth daily at bedtime 90 tablet 3    Biotin 64563 MCG TABS Take 5,000 mg by mouth daily      calcium citrate (CALCITRATE) 950 MG tablet Take 1 tablet by mouth daily      calcium citrate-vitamin D (CITRACAL+D) 315-200 MG-UNIT per tablet Take 1 tablet by mouth 2 (two) times a day      Cholecalciferol (VITAMIN D3) 2000 units capsule Take 2,000 Units by mouth daily        Coenzyme Q10 (CO Q-10) 400 MG CAPS Take 1 capsule by mouth Daily      cycloSPORINE (RESTASIS) 0 05 % ophthalmic emulsion Administer to both eyes every 12 (twelve) hours        ezetimibe (ZETIA) 10 mg tablet Take 1 tablet (10 mg total) by mouth every morning 90 tablet 3    Icosapent Ethyl (Vascepa) 1 g CAPS Take 2 capsules (2 g total) by mouth 2 (two) times a day 120 capsule 11    lisinopril (ZESTRIL) 20 mg tablet TAKE 1 TABLET BY MOUTH  DAILY 90 tablet 3    metoprolol succinate (TOPROL-XL) 25 mg 24 hr tablet TAKE 1 TABLET BY MOUTH  DAILY 90 tablet 3    Misc Natural Products (OSTEO BI-FLEX/5-LOXIN ADVANCED) TABS Take by mouth daily      multivitamin-iron-minerals-folic acid (CENTRUM) chewable tablet Chew 1 tablet daily      Naproxen Sodium (ALEVE) 220 MG CAPS Take by mouth      tamsulosin (FLOMAX) 0 4 mg TAKE 1 CAPSULE BY MOUTH  DAILY AT BEDTIME 90 capsule 3    TURMERIC PO Take by mouth       No current facility-administered medications for this visit        No Known Allergies   Immunizations:     Immunization History   Administered Date(s) Administered    Influenza Quadrivalent Preservative Free 3 years and older IM 11/09/2015, 10/19/2016    Influenza, high dose seasonal 0 7 mL 11/04/2019, 12/14/2020    Influenza, injectable, quadrivalent, preservative free 0 5 mL 11/05/2018    Influenza, seasonal, injectable 11/21/2014, 11/03/2017    Pneumococcal Conjugate 13-Valent 06/05/2019    SARS-CoV-2 / COVID-19 mRNA IM (Bindu Carreno) 02/11/2021    Tdap 08/10/2007, 11/20/2017      Health Maintenance:         Topic Date Due    Hepatitis C Screening  1950    Colorectal Cancer Screening  12/10/2002         Topic Date Due    Pneumococcal Vaccine: 65+ Years (2 of 2 - PPSV23) 06/05/2020      Medicare Health Risk Assessment:     /70   Pulse 72   Temp 98 1 °F (36 7 °C) (Tympanic)   Resp 16   Ht 5' 5" (1 651 m)   Wt 97 5 kg (215 lb)   BMI 35 78 kg/m²      Jian Arciniega is here for his Subsequent Wellness visit  Health Risk Assessment:   Patient rates overall health as very good  Patient feels that their physical health rating is much better  Eyesight was rated as same  Hearing was rated as same  Patient feels that their emotional and mental health rating is same  Pain experienced in the last 7 days has been none  Patient states that he has experienced no weight loss or gain in last 6 months  Depression Screening:   PHQ-2 Score: 0      Fall Risk Screening: In the past year, patient has experienced: no history of falling in past year      Home Safety:  Patient does not have trouble with stairs inside or outside of their home  Patient has working smoke alarms and has working carbon monoxide detector  Home safety hazards include: none  Nutrition:   Current diet is Regular and Limited junk food  Medications:   Patient is currently taking over-the-counter supplements  OTC medications include: see medication list  Patient is able to manage medications       Activities of Daily Living (ADLs)/Instrumental Activities of Daily Living (IADLs): Walk and transfer into and out of bed and chair?: Yes  Dress and groom yourself?: Yes    Bathe or shower yourself?: Yes    Feed yourself? Yes  Do your laundry/housekeeping?: Yes  Manage your money, pay your bills and track your expenses?: Yes  Make your own meals?: Yes    Do your own shopping?: Yes    Previous Hospitalizations:   Any hospitalizations or ED visits within the last 12 months?: No      Advance Care Planning:   Living will: Yes    Durable POA for healthcare:  Yes    Advanced directive: Yes      Cognitive Screening:   Provider or family/friend/caregiver concerned regarding cognition?: No    PREVENTIVE SCREENINGS      Cardiovascular Screening:    General: Screening Current      Diabetes Screening:     General: Screening Current      Colorectal Cancer Screening:     General: Risks and Benefits Discussed    Due for: Cologuard      Prostate Cancer Screening:    General: Screening Not Indicated      Osteoporosis Screening:    General: Screening Not Indicated      Abdominal Aortic Aneurysm (AAA) Screening:    Risk factors include: age between 73-69 yo        General: Screening Not Indicated      Lung Cancer Screening:     General: Screening Not Indicated      Hepatitis C Screening:    General: Screening Not Indicated      Preventive Screening Comments: Has cologuard kit at home -  Did not do it yet -  Dec;ined colonoscopy       Denise Morataya MD

## 2021-02-25 DIAGNOSIS — E78.5 DYSLIPIDEMIA: ICD-10-CM

## 2021-03-01 RX ORDER — ATORVASTATIN CALCIUM 40 MG/1
TABLET, FILM COATED ORAL
Qty: 90 TABLET | Refills: 3 | Status: SHIPPED | OUTPATIENT
Start: 2021-03-01 | End: 2022-03-12

## 2021-03-11 ENCOUNTER — IMMUNIZATIONS (OUTPATIENT)
Dept: FAMILY MEDICINE CLINIC | Facility: HOSPITAL | Age: 71
End: 2021-03-11

## 2021-03-11 DIAGNOSIS — Z23 ENCOUNTER FOR IMMUNIZATION: Primary | ICD-10-CM

## 2021-03-11 PROCEDURE — 0012A SARS-COV-2 / COVID-19 MRNA VACCINE (MODERNA) 100 MCG: CPT

## 2021-03-11 PROCEDURE — 91301 SARS-COV-2 / COVID-19 MRNA VACCINE (MODERNA) 100 MCG: CPT

## 2021-03-13 DIAGNOSIS — E78.5 DYSLIPIDEMIA: ICD-10-CM

## 2021-03-16 RX ORDER — EZETIMIBE 10 MG/1
TABLET ORAL
Qty: 90 TABLET | Refills: 3 | Status: SHIPPED | OUTPATIENT
Start: 2021-03-16 | End: 2022-03-12

## 2021-03-20 DIAGNOSIS — E78.5 DYSLIPIDEMIA: ICD-10-CM

## 2021-03-22 RX ORDER — ICOSAPENT ETHYL 1000 MG/1
CAPSULE ORAL
Qty: 360 CAPSULE | Refills: 3 | Status: SHIPPED | OUTPATIENT
Start: 2021-03-22 | End: 2022-01-19

## 2021-03-30 ENCOUNTER — FOLLOW UP (OUTPATIENT)
Dept: URBAN - METROPOLITAN AREA CLINIC 27 | Facility: CLINIC | Age: 71
End: 2021-03-30

## 2021-03-30 DIAGNOSIS — H35.371: ICD-10-CM

## 2021-03-30 DIAGNOSIS — H52.13: ICD-10-CM

## 2021-03-30 DIAGNOSIS — H43.813: ICD-10-CM

## 2021-03-30 DIAGNOSIS — H43.22: ICD-10-CM

## 2021-03-30 DIAGNOSIS — H25.9: ICD-10-CM

## 2021-03-30 PROCEDURE — 92134 CPTRZ OPH DX IMG PST SGM RTA: CPT

## 2021-03-30 PROCEDURE — 92014 COMPRE OPH EXAM EST PT 1/>: CPT

## 2021-03-30 ASSESSMENT — TONOMETRY
OS_IOP_MMHG: 21
OD_IOP_MMHG: 21

## 2021-03-30 ASSESSMENT — VISUAL ACUITY
OS_CC: 20/30+1
OD_PH: 20/30+1
OS_PH: 20/25-2
OD_CC: 20/40

## 2021-03-31 DIAGNOSIS — I10 ESSENTIAL HYPERTENSION: ICD-10-CM

## 2021-03-31 RX ORDER — LISINOPRIL 20 MG/1
20 TABLET ORAL DAILY
Qty: 90 TABLET | Refills: 3 | Status: SHIPPED | OUTPATIENT
Start: 2021-03-31 | End: 2022-03-12

## 2021-06-21 ENCOUNTER — OFFICE VISIT (OUTPATIENT)
Dept: OBGYN CLINIC | Facility: CLINIC | Age: 71
End: 2021-06-21
Payer: COMMERCIAL

## 2021-06-21 VITALS
WEIGHT: 217 LBS | HEART RATE: 69 BPM | SYSTOLIC BLOOD PRESSURE: 146 MMHG | DIASTOLIC BLOOD PRESSURE: 78 MMHG | HEIGHT: 65 IN | BODY MASS INDEX: 36.15 KG/M2

## 2021-06-21 DIAGNOSIS — M65.332 TRIGGER FINGER, LEFT MIDDLE FINGER: Primary | ICD-10-CM

## 2021-06-21 PROCEDURE — 1036F TOBACCO NON-USER: CPT | Performed by: ORTHOPAEDIC SURGERY

## 2021-06-21 PROCEDURE — 99214 OFFICE O/P EST MOD 30 MIN: CPT | Performed by: ORTHOPAEDIC SURGERY

## 2021-06-21 PROCEDURE — 1160F RVW MEDS BY RX/DR IN RCRD: CPT | Performed by: ORTHOPAEDIC SURGERY

## 2021-06-21 PROCEDURE — 3008F BODY MASS INDEX DOCD: CPT | Performed by: ORTHOPAEDIC SURGERY

## 2021-06-21 PROCEDURE — 20550 NJX 1 TENDON SHEATH/LIGAMENT: CPT | Performed by: ORTHOPAEDIC SURGERY

## 2021-06-21 RX ORDER — TRIAMCINOLONE ACETONIDE 40 MG/ML
20 INJECTION, SUSPENSION INTRA-ARTICULAR; INTRAMUSCULAR
Status: COMPLETED | OUTPATIENT
Start: 2021-06-21 | End: 2021-06-21

## 2021-06-21 RX ORDER — LIDOCAINE HYDROCHLORIDE 10 MG/ML
0.5 INJECTION, SOLUTION INFILTRATION; PERINEURAL
Status: COMPLETED | OUTPATIENT
Start: 2021-06-21 | End: 2021-06-21

## 2021-06-21 RX ADMIN — TRIAMCINOLONE ACETONIDE 20 MG: 40 INJECTION, SUSPENSION INTRA-ARTICULAR; INTRAMUSCULAR at 10:36

## 2021-06-21 RX ADMIN — LIDOCAINE HYDROCHLORIDE 0.5 ML: 10 INJECTION, SOLUTION INFILTRATION; PERINEURAL at 10:36

## 2021-06-21 NOTE — PROGRESS NOTES
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, as he is a musician  He did have prior steroid injection for a right hand trigger finger in the past and has done well with that  He notes good sensation in to the hand and good ROM of the digit  Review of Systems   Constitutional: Negative  Negative for chills and fever  HENT: Negative  Negative for ear pain and sore throat  Eyes: Negative  Negative for pain and redness  Respiratory: Negative  Negative for shortness of breath and wheezing  Cardiovascular: Negative for chest pain and palpitations  Gastrointestinal: Negative  Negative for abdominal pain and blood in stool  Endocrine: Negative  Negative for polydipsia and polyuria  Genitourinary: Negative  Negative for difficulty urinating and dysuria  Musculoskeletal:        As noted in HPI   Skin: Negative  Negative for pallor and rash  Neurological: Negative  Negative for dizziness and numbness  Hematological: Negative  Negative for adenopathy  Does not bruise/bleed easily  Psychiatric/Behavioral: Negative  Negative for confusion and suicidal ideas           Past Medical History:   Diagnosis Date    Abnormal electrocardiogram     last assessed 12/19/13    Arthritis     hand and forearm    Chest pain     Coronary artery disease     Enthesopathy of elbow 02/15/2006    Epididymo-orchitis 07/10/2012    Hyperlipidemia     Hypertension     Obesity     Voice disturbance 05/27/2008       Past Surgical History:   Procedure Laterality Date    ANGIOPLASTY  2013    one stent RCA    APPENDECTOMY      CARPAL TUNNEL RELEASE Left 2018    CORONARY ANGIOPLASTY      CORONARY ANGIOPLASTY WITH STENT PLACEMENT      CORONARY ANGIOPLASTY WITH STENT PLACEMENT  2013    KS REVISE MEDIAN N/CARPAL TUNNEL SURG Left 8/13/2018    Procedure: RELEASE CARPAL TUNNEL;  Surgeon: Robin Venegas MD;  Location: [facility];  Service: Orthopedics    [place] REVISE MEDIAN N/CARPAL TUNNEL SURG Right 9/19/2019    Procedure: RELEASE CARPAL TUNNEL;  Surgeon: Robin Venegas MD;  Location: [facility];  Service: Orthopedics       Family History   Problem Relation Age of Onset    Heart disease Mother     Hypertension Mother     Heart disease Father         CHF    Arthritis Brother     Obesity Brother     Hypertension Brother     No Known Problems Maternal Aunt     No Known Problems Maternal Uncle     No Known Problems Paternal Aunt     No Known Problems Paternal Uncle     No Known Problems Maternal Grandmother     No Known Problems Maternal Grandfather     No Known Problems Paternal Grandmother     No Known Problems Paternal Grandfather        Social History     Occupational History    Not on file   Tobacco Use    Smoking status: Never Smoker    Smokeless tobacco: Never Used   Substance and Sexual Activity    Alcohol use: No    Drug use: No    Sexual activity: Not on file         Current Outpatient Medications:     aspirin (ASPIRIN LOW DOSE) 81 MG tablet, Take 1 tablet by mouth daily Last dose is 8/7/18 , Disp: , Rfl:     atorvastatin (LIPITOR) 40 mg tablet, TAKE 1 TABLET BY MOUTH  DAILY AT BEDTIME, Disp: 90 tablet, Rfl: 3    Biotin 30424 MCG TABS, Take 5,000 mg by mouth daily, Disp: , Rfl:     calcium citrate (CALCITRATE) 950 MG tablet, Take 1 tablet by mouth daily, Disp: , Rfl:     calcium citrate-vitamin D (CITRACAL+D) 315-200 MG-UNIT per tablet, Take 1 tablet by mouth 2 (two) times a day, Disp: , Rfl:     Cholecalciferol (VITAMIN D3) 2000 units capsule, Take 2,000 Units by mouth daily  , Disp: , Rfl:     Coenzyme Q10 (CO Q-10) 400 MG CAPS, Take 1 capsule by mouth Daily, Disp: , Rfl:     cycloSPORINE (RESTASIS) 0 05 % ophthalmic emulsion, Administer to both eyes every 12 (twelve) hours  , Disp: , Rfl:     ezetimibe (ZETIA) 10 mg tablet, TAKE 1 TABLET BY MOUTH IN  THE MORNING, Disp: 90 tablet, Rfl: 3    lisinopril (ZESTRIL) 20 mg tablet, Take 1 tablet (20 mg total) by mouth daily, Disp: 90 tablet, Rfl: 3    metoprolol succinate (TOPROL-XL) 25 mg 24 hr tablet, TAKE 1 TABLET BY MOUTH  DAILY, Disp: 90 tablet, Rfl: 3    Misc Natural Products (OSTEO BI-FLEX/5-LOXIN ADVANCED) TABS, Take by mouth daily, Disp: , Rfl:     multivitamin-iron-minerals-folic acid (CENTRUM) chewable tablet, Chew 1 tablet daily, Disp: , Rfl:     Naproxen Sodium (ALEVE) 220 MG CAPS, Take by mouth, Disp: , Rfl:     tamsulosin (FLOMAX) 0 4 mg, TAKE 1 CAPSULE BY MOUTH  DAILY AT BEDTIME, Disp: 90 capsule, Rfl: 3    TURMERIC PO, Take by mouth, Disp: , Rfl:     Vascepa 1 g CAPS, TAKE 2 CAPSULES BY MOUTH  TWO TIMES A DAY, Disp: 360 capsule, Rfl: 3    No Known Allergies    Objective:  Vitals:    06/21/21 1016   BP: 146/78   Pulse: 69       Ortho Exam      Physical Exam  Constitutional:       General: He is not in acute distress  Appearance: He is well-developed  HENT:      Head: Normocephalic and atraumatic  Eyes:      General: No scleral icterus  Conjunctiva/sclera: Conjunctivae normal    Neck:      Vascular: No JVD  Cardiovascular:      Rate and Rhythm: Normal rate  Pulmonary:      Effort: Pulmonary effort is normal  No respiratory distress  Skin:     General: Skin is warm  Neurological:      Mental Status: He is alert and oriented to person, place, and time  Coordination: Coordination normal      Left middle finger: Tenderness A1 pulley  Triggering appreciated   Full ROM  Sensation intact  2+ radial pulse  Hand/upper extremity injection: L long A1  Universal Protocol:  Risks and benefits: risks, benefits and alternatives were discussed  Consent given by: patient  Time out: Immediately prior to procedure a "time out" was called to verify the correct patient, procedure, equipment, support staff and site/side marked as required    Timeout called at: 6/21/2021 10:27 AM   Site marked: the operative site was marked  Procedure Details  Condition:trigger finger Location: long finger - L long A1   Preparation: Patient was prepped and draped in the usual sterile fashion  Needle size: 25 G  Ultrasound guidance: no  Approach: volar  Medications administered: 0 5 mL lidocaine 1 %; 20 mg triamcinolone acetonide 40 mg/mL    Patient tolerance: patient tolerated the procedure well with no immediate complications  Dressing:  Sterile dressing applied

## 2021-08-04 LAB
ALBUMIN SERPL-MCNC: 4.4 G/DL (ref 3.8–4.8)
ALBUMIN/GLOB SERPL: 1.8 {RATIO} (ref 1.2–2.2)
ALP SERPL-CCNC: 46 IU/L (ref 48–121)
ALT SERPL-CCNC: 30 IU/L (ref 0–44)
AST SERPL-CCNC: 27 IU/L (ref 0–40)
BILIRUB SERPL-MCNC: 0.6 MG/DL (ref 0–1.2)
BUN SERPL-MCNC: 17 MG/DL (ref 8–27)
BUN/CREAT SERPL: 18 (ref 10–24)
CALCIUM SERPL-MCNC: 9.2 MG/DL (ref 8.6–10.2)
CHLORIDE SERPL-SCNC: 102 MMOL/L (ref 96–106)
CHOLEST SERPL-MCNC: 124 MG/DL (ref 100–199)
CO2 SERPL-SCNC: 27 MMOL/L (ref 20–29)
CREAT SERPL-MCNC: 0.93 MG/DL (ref 0.76–1.27)
GLOBULIN SER-MCNC: 2.4 G/DL (ref 1.5–4.5)
GLUCOSE SERPL-MCNC: 97 MG/DL (ref 65–99)
HBA1C MFR BLD: 6.2 % (ref 4.8–5.6)
HDLC SERPL-MCNC: 36 MG/DL
LDLC SERPL CALC-MCNC: 68 MG/DL (ref 0–99)
MICRODELETION SYND BLD/T FISH: NORMAL
POTASSIUM SERPL-SCNC: 4.7 MMOL/L (ref 3.5–5.2)
PROT SERPL-MCNC: 6.8 G/DL (ref 6–8.5)
SL AMB EGFR AFRICAN AMERICAN: 97 ML/MIN/1.73
SL AMB EGFR NON AFRICAN AMERICAN: 84 ML/MIN/1.73
SL AMB VLDL CHOLESTEROL CALC: 20 MG/DL (ref 5–40)
SODIUM SERPL-SCNC: 139 MMOL/L (ref 134–144)
TRIGL SERPL-MCNC: 109 MG/DL (ref 0–149)

## 2021-08-05 ENCOUNTER — TELEPHONE (OUTPATIENT)
Dept: FAMILY MEDICINE CLINIC | Facility: CLINIC | Age: 71
End: 2021-08-05

## 2021-08-05 NOTE — TELEPHONE ENCOUNTER
----- Message from Grayson Runner, MD sent at 8/5/2021  9:00 AM EDT -----  Pl, advise pt -  anthony is needed for f/u HTN and labs

## 2021-08-18 ENCOUNTER — RA CDI HCC (OUTPATIENT)
Dept: OTHER | Facility: HOSPITAL | Age: 71
End: 2021-08-18

## 2021-08-18 NOTE — PROGRESS NOTES
[place] [id number]  coding opportunities             Chart Reviewed * (Number of) Inbasket suggestions sent to Provider: 1               Number of suggestions NOT actually used: 1     Patients insurance company: [address]  (Medicare Advantage and Commercial)     Visit status: Patient arrived for their scheduled appointment     Provider never responded to [place] [phone]  coding request     [place] [phone]  coding opportunities             Chart Reviewed * (Number of) Inbasket suggestions sent to Provider: 1   E66 01 Morbid, severe, obesity due to excess calories [facility])     If this is correct, please document and assess at your next visit 8/24/21               Patients insurance company: [address]  (Medicare Advantage and Commercial)

## 2021-08-24 ENCOUNTER — OFFICE VISIT (OUTPATIENT)
Dept: FAMILY MEDICINE CLINIC | Facility: CLINIC | Age: 71
End: 2021-08-24
Payer: COMMERCIAL

## 2021-08-24 VITALS
TEMPERATURE: 98 F | HEIGHT: 65 IN | RESPIRATION RATE: 14 BRPM | DIASTOLIC BLOOD PRESSURE: 72 MMHG | BODY MASS INDEX: 36.49 KG/M2 | SYSTOLIC BLOOD PRESSURE: 120 MMHG | WEIGHT: 219 LBS | HEART RATE: 72 BPM

## 2021-08-24 DIAGNOSIS — E66.09 CLASS 2 OBESITY DUE TO EXCESS CALORIES WITHOUT SERIOUS COMORBIDITY WITH BODY MASS INDEX (BMI) OF 36.0 TO 36.9 IN ADULT: ICD-10-CM

## 2021-08-24 DIAGNOSIS — R73.03 PREDIABETES: ICD-10-CM

## 2021-08-24 DIAGNOSIS — Z12.11 SCREENING FOR COLON CANCER: ICD-10-CM

## 2021-08-24 DIAGNOSIS — E78.5 DYSLIPIDEMIA: ICD-10-CM

## 2021-08-24 DIAGNOSIS — I10 ESSENTIAL HYPERTENSION: Primary | ICD-10-CM

## 2021-08-24 DIAGNOSIS — I25.9 ISCHEMIC HEART DISEASE, CHRONIC: ICD-10-CM

## 2021-08-24 PROCEDURE — 3074F SYST BP LT 130 MM HG: CPT | Performed by: FAMILY MEDICINE

## 2021-08-24 PROCEDURE — 1160F RVW MEDS BY RX/DR IN RCRD: CPT | Performed by: FAMILY MEDICINE

## 2021-08-24 PROCEDURE — 1036F TOBACCO NON-USER: CPT | Performed by: FAMILY MEDICINE

## 2021-08-24 PROCEDURE — 99214 OFFICE O/P EST MOD 30 MIN: CPT | Performed by: FAMILY MEDICINE

## 2021-08-24 PROCEDURE — 3078F DIAST BP <80 MM HG: CPT | Performed by: FAMILY MEDICINE

## 2021-08-24 PROCEDURE — 3008F BODY MASS INDEX DOCD: CPT | Performed by: FAMILY MEDICINE

## 2021-08-24 NOTE — PROGRESS NOTES
Chief Complaint   Patient presents with    Blood Pressure Check    Follow-up     chronic conditions         Patient ID: Anderson Mauricio is a 78 y o  male  HPI  Pt is seeing for f/u HLD, CAD, HTN, Pre DM, Obesity -  All stable -  Worsening Hg AIC from 5 8 to 6 2 -  Taking all meds, inconsistent with diet ad exercises, did not see cardiologist in 2 y     The following portions of the patient's history were reviewed and updated as appropriate: allergies, current medications, past family history, past medical history, past social history, past surgical history and problem list     Review of Systems   Constitutional: Negative  Respiratory: Negative  Cardiovascular: Negative  Gastrointestinal: Negative  Genitourinary: Negative  Musculoskeletal: Negative  Skin: Negative  Neurological: Negative          Current Outpatient Medications   Medication Sig Dispense Refill    aspirin (ASPIRIN LOW DOSE) 81 MG tablet Take 1 tablet by mouth daily Last dose is 8/7/18       atorvastatin (LIPITOR) 40 mg tablet TAKE 1 TABLET BY MOUTH  DAILY AT BEDTIME 90 tablet 3    Biotin 17655 MCG TABS Take 5,000 mg by mouth daily      calcium citrate (CALCITRATE) 950 MG tablet Take 1 tablet by mouth daily      calcium citrate-vitamin D (CITRACAL+D) 315-200 MG-UNIT per tablet Take 1 tablet by mouth 2 (two) times a day      Cholecalciferol (VITAMIN D3) 2000 units capsule Take 2,000 Units by mouth daily        Coenzyme Q10 (CO Q-10) 400 MG CAPS Take 1 capsule by mouth Daily      cycloSPORINE (RESTASIS) 0 05 % ophthalmic emulsion Administer to both eyes every 12 (twelve) hours        ezetimibe (ZETIA) 10 mg tablet TAKE 1 TABLET BY MOUTH IN  THE MORNING 90 tablet 3    lisinopril (ZESTRIL) 20 mg tablet Take 1 tablet (20 mg total) by mouth daily 90 tablet 3    metoprolol succinate (TOPROL-XL) 25 mg 24 hr tablet TAKE 1 TABLET BY MOUTH  DAILY 90 tablet 3    Misc Natural Products (OSTEO BI-FLEX/5-LOXIN ADVANCED) TABS Take by mouth daily      multivitamin-iron-minerals-folic acid (CENTRUM) chewable tablet Chew 1 tablet daily      Naproxen Sodium (ALEVE) 220 MG CAPS Take by mouth      tamsulosin (FLOMAX) 0 4 mg TAKE 1 CAPSULE BY MOUTH  DAILY AT BEDTIME 90 capsule 3    TURMERIC PO Take by mouth      Vascepa 1 g CAPS TAKE 2 CAPSULES BY MOUTH  TWO TIMES A  capsule 3     No current facility-administered medications for this visit  Objective:    /72 (BP Location: Right arm, Patient Position: Sitting, Cuff Size: Adult)   Pulse 72   Temp 98 °F (36 7 °C) (Temporal)   Resp 14   Ht 5' 5" (1 651 m)   Wt 99 3 kg (219 lb)   BMI 36 44 kg/m²        Physical Exam  Constitutional:       Appearance: He is obese  He is not ill-appearing  Cardiovascular:      Rate and Rhythm: Normal rate and regular rhythm  Heart sounds: No murmur heard  Pulmonary:      Effort: Pulmonary effort is normal  No respiratory distress  Breath sounds: No wheezing, rhonchi or rales  Musculoskeletal:      Right lower leg: No edema  Left lower leg: No edema  Neurological:      General: No focal deficit present  Mental Status: He is alert and oriented to person, place, and time  Psychiatric:         Mood and Affect: Mood normal            Labs in chart were reviewed    Recent Results (from the past 672 hour(s))   Comprehensive metabolic panel    Collection Time: 08/04/21  8:44 AM   Result Value Ref Range    Glucose, Random 97 65 - 99 mg/dL    BUN 17 8 - 27 mg/dL    Creatinine 0 93 0 76 - 1 27 mg/dL    eGFR Non  84 >59 mL/min/1 73    eGFR  97 >59 mL/min/1 73    SL AMB BUN/CREATININE RATIO 18 10 - 24    Sodium 139 134 - 144 mmol/L    Potassium 4 7 3 5 - 5 2 mmol/L    Chloride 102 96 - 106 mmol/L    CO2 27 20 - 29 mmol/L    CALCIUM 9 2 8 6 - 10 2 mg/dL    Protein, Total 6 8 6 0 - 8 5 g/dL    Albumin 4 4 3 8 - 4 8 g/dL    Globulin, Total 2 4 1 5 - 4 5 g/dL    Albumin/Globulin Ratio 1 8 1 2 - 2 2    TOTAL BILIRUBIN 0 6 0 0 - 1 2 mg/dL    Alk Phos Isoenzymes 46 (L) 48 - 121 IU/L    AST 27 0 - 40 IU/L    ALT 30 0 - 44 IU/L   Lipid panel    Collection Time: 08/04/21  8:44 AM   Result Value Ref Range    Cholesterol, Total 124 100 - 199 mg/dL    Triglycerides 109 0 - 149 mg/dL    HDL 36 (L) >39 mg/dL    VLDL Cholesterol Calculated 20 5 - 40 mg/dL    LDL Calculated 68 0 - 99 mg/dL   Hemoglobin A1c (w/out EAG)    Collection Time: 08/04/21  8:44 AM   Result Value Ref Range    Hemoglobin A1C 6 2 (H) 4 8 - 5 6 %   Cardiovascular Report    Collection Time: 08/04/21  8:44 AM   Result Value Ref Range    Interpretation Note        Assessment/Plan:         Diagnoses and all orders for this visit:    Essential hypertension  Stable  Cont meds  Ischemic heart disease, chronic  -     Ambulatory referral to Cardiology; Future    Dyslipidemia  Low HDL -  Diet and exercises advised  Cont meds  Prediabetes  Low carbs diet, weight loss advised  [facility] colon cancer  -     [facility]    Class 2 obesity due to excess calories without serious comorbidity with body mass index (BMI) of 36 0 to 36 9 in adult            BMI Counseling: Body mass index is 36 44 kg/m²  Discussed the patient's BMI with him  The BMI is above normal  Nutrition recommendations include reducing portion sizes, decreasing overall calorie intake, 3-5 servings of fruits/vegetables daily and reducing fast food intake  Exercise recommendations include exercising 3-5 times per week           rto in 6          Lucio Gallego MD

## 2021-08-25 DIAGNOSIS — I25.10 CORONARY ARTERY DISEASE INVOLVING NATIVE CORONARY ARTERY OF NATIVE HEART WITHOUT ANGINA PECTORIS: ICD-10-CM

## 2021-08-28 RX ORDER — METOPROLOL SUCCINATE 25 MG/1
TABLET, EXTENDED RELEASE ORAL
Qty: 90 TABLET | Refills: 3 | Status: SHIPPED | OUTPATIENT
Start: 2021-08-28

## 2021-09-24 ENCOUNTER — OFFICE VISIT (OUTPATIENT)
Dept: CARDIOLOGY CLINIC | Facility: CLINIC | Age: 71
End: 2021-09-24
Payer: COMMERCIAL

## 2021-09-24 VITALS
TEMPERATURE: 98.7 F | HEIGHT: 65 IN | DIASTOLIC BLOOD PRESSURE: 72 MMHG | WEIGHT: 217 LBS | HEART RATE: 69 BPM | SYSTOLIC BLOOD PRESSURE: 124 MMHG | OXYGEN SATURATION: 97 % | BODY MASS INDEX: 36.15 KG/M2

## 2021-09-24 DIAGNOSIS — I10 ESSENTIAL HYPERTENSION: Primary | ICD-10-CM

## 2021-09-24 DIAGNOSIS — I25.10 CORONARY ARTERY DISEASE INVOLVING NATIVE CORONARY ARTERY OF NATIVE HEART WITHOUT ANGINA PECTORIS: ICD-10-CM

## 2021-09-24 DIAGNOSIS — E78.5 DYSLIPIDEMIA: ICD-10-CM

## 2021-09-24 PROCEDURE — 1036F TOBACCO NON-USER: CPT | Performed by: INTERNAL MEDICINE

## 2021-09-24 PROCEDURE — 99214 OFFICE O/P EST MOD 30 MIN: CPT | Performed by: INTERNAL MEDICINE

## 2021-09-24 PROCEDURE — 1160F RVW MEDS BY RX/DR IN RCRD: CPT | Performed by: INTERNAL MEDICINE

## 2021-09-24 PROCEDURE — 3008F BODY MASS INDEX DOCD: CPT | Performed by: INTERNAL MEDICINE

## 2021-09-24 PROCEDURE — 3074F SYST BP LT 130 MM HG: CPT | Performed by: INTERNAL MEDICINE

## 2021-09-24 PROCEDURE — 3078F DIAST BP <80 MM HG: CPT | Performed by: INTERNAL MEDICINE

## 2021-09-24 PROCEDURE — 93000 ELECTROCARDIOGRAM COMPLETE: CPT | Performed by: INTERNAL MEDICINE

## 2021-09-24 NOTE — PROGRESS NOTES
Cardiology Follow Up    Aura Machado  1950  [id number]      Interval History: Aura Machado is here for follow up of CAD  Since his last visit, he has been feeling well   he denies any palpitations, chest pain, shortness of breath, LE edema, orthopnea or PND  Diet is overall unchanged  There has not been a significant change in weight  He had blood work done in August which showed LDL cholesterol of 68  Triglycerides of 109 and A1c of 6 2%  reports good adherence with medications  Current medication regimen includes Toprol XL 25 mg daily, lisinopril 20 mg daily, atorvastatin 40 mg, Zetia 10 mg and Vascepa 2 g twice a day  He has a history of RCA stent in 2014  Prior to his PCI he had a burning sensation in his chest  He previously was on atorvastatin 80 mg daily which was reduced to 40 mg daily due to myalgias and elevated creatinine kinase  The following portions of the patient's history were reviewed and updated as appropriate:   He  has a past medical history of Abnormal electrocardiogram, Arthritis, Chest pain, Coronary artery disease, Enthesopathy of elbow (02/15/2006), Epididymo-orchitis (07/10/2012), Hyperlipidemia, Hypertension, Obesity, and Voice disturbance (05/27/2008)  He  has a past surgical history that includes Coronary angioplasty with stent; Appendectomy; Coronary angioplasty; Coronary angioplasty with stent (2013); pr revise median n/carpal tunnel surg (Left, 8/13/2018); Angioplasty (2013); Carpal tunnel release (Left, 2018); and pr revise median n/carpal tunnel surg (Right, 9/19/2019)  His family history includes Arthritis in his brother; Heart disease in his father and mother; Hypertension in his brother and mother; No Known Problems in his maternal aunt, maternal grandfather, maternal grandmother, maternal uncle, paternal aunt, paternal grandfather, paternal grandmother, and paternal uncle;  Obesity in his brother  He  reports that he has never smoked  He has never used smokeless tobacco  He reports that he does not drink alcohol and does not use drugs  Current Outpatient Medications   Medication Sig Dispense Refill    aspirin (ASPIRIN LOW DOSE) 81 MG tablet Take 1 tablet by mouth daily Last dose is 8/7/18       atorvastatin (LIPITOR) 40 mg tablet TAKE 1 TABLET BY MOUTH  DAILY AT BEDTIME 90 tablet 3    Biotin 88892 MCG TABS Take 5,000 mg by mouth daily      calcium citrate (CALCITRATE) 950 MG tablet Take 1 tablet by mouth daily      calcium citrate-vitamin D (CITRACAL+D) 315-200 MG-UNIT per tablet Take 1 tablet by mouth 2 (two) times a day      Cholecalciferol (VITAMIN D3) 2000 units capsule Take 2,000 Units by mouth daily        Coenzyme Q10 (CO Q-10) 400 MG CAPS Take 1 capsule by mouth Daily      cycloSPORINE (RESTASIS) 0 05 % ophthalmic emulsion Administer to both eyes every 12 (twelve) hours        ezetimibe (ZETIA) 10 mg tablet TAKE 1 TABLET BY MOUTH IN  THE MORNING 90 tablet 3    lisinopril (ZESTRIL) 20 mg tablet Take 1 tablet (20 mg total) by mouth daily 90 tablet 3    metoprolol succinate (TOPROL-XL) 25 mg 24 hr tablet TAKE 1 TABLET BY MOUTH  DAILY 90 tablet 3    Misc Natural Products (OSTEO BI-FLEX/5-LOXIN ADVANCED) TABS Take by mouth daily      multivitamin-iron-minerals-folic acid (CENTRUM) chewable tablet Chew 1 tablet daily      tamsulosin (FLOMAX) 0 4 mg TAKE 1 CAPSULE BY MOUTH  DAILY AT BEDTIME 90 capsule 3    TURMERIC PO Take by mouth      Vascepa 1 g CAPS TAKE 2 CAPSULES BY MOUTH  TWO TIMES A  capsule 3    Naproxen Sodium (ALEVE) 220 MG CAPS Take by mouth (Patient not taking: Reported on 9/24/2021)       No current facility-administered medications for this visit  He has No Known Allergies         Review of Systems:  Review of Systems   Respiratory: Negative for shortness of breath  Cardiovascular: Negative for chest pain, palpitations and leg swelling  Musculoskeletal: Positive for arthralgias  All other systems reviewed and are negative  Physical Exam:  /72 (BP Location: Left arm, Patient Position: Sitting, Cuff Size: Large)   Pulse 69   Temp 98 7 °F (37 1 °C)   Ht 5' 5" (1 651 m)   Wt 98 4 kg (217 lb)   SpO2 97%   BMI 36 11 kg/m²     Physical Exam  Constitutional:       General: He is not in acute distress  Appearance: He is well-developed  He is not diaphoretic  HENT:      Head: Normocephalic and atraumatic  Eyes:      Conjunctiva/sclera: Conjunctivae normal       Pupils: Pupils are equal, round, and reactive to light  Neck:      Thyroid: No thyromegaly  Vascular: No JVD  Cardiovascular:      Rate and Rhythm: Normal rate and regular rhythm  Heart sounds: Normal heart sounds  No murmur heard  No friction rub  No gallop  Pulmonary:      Effort: Pulmonary effort is normal       Breath sounds: Normal breath sounds  Musculoskeletal:      Cervical back: Neck supple  Skin:     General: Skin is warm and dry  Findings: No erythema or rash  Neurological:      General: No focal deficit present  Mental Status: He is alert and oriented to person, place, and time  Cranial Nerves: No cranial nerve deficit  Psychiatric:         Mood and Affect: Mood normal          Behavior: Behavior normal          Thought Content:  Thought content normal          Judgment: Judgment normal          Cardiographics  ECG: NSR with LVH    Labs:  Lab Results   Component Value Date     05/12/2017    K 4 7 08/04/2021     08/04/2021    CO2 27 08/04/2021    BUN 17 08/04/2021    CREATININE 0 93 08/04/2021    CREATININE 0 81 08/30/2019    CREATININE 0 82 05/12/2017    GLUCOSE 94 05/12/2017    CALCIUM 8 5 08/30/2019    CALCIUM 8 8 05/12/2017     Lab Results   Component Value Date    WBC 6 91 08/30/2019    HGB 14 1 08/30/2019    HCT 43 1 08/30/2019    MCV 99 (H) 08/30/2019     08/30/2019     Lab Results   Component Value Date    CHOL 112 05/12/2017    TRIG 109 08/04/2021    HDL 36 (L) 08/04/2021     Imaging: No results found  Discussion/Summary:  1  Coronary artery disease involving native coronary artery of native heart without angina pectoris  -   Patient is free of angina  Continue Toprol XL 25 mg daily  -   Continue aspirin 81 mg daily for secondary prevention   -   Discussed diet and exercise  Encouraged to lose 15-20 lb  2  Essential hypertension  -   Blood pressure target is less than 130/80  Current blood pressure is 124/72  Continue current medication regimen   - POCT ECG    3  Dyslipidemia  -   LDL target is less than 70 mg/dL  Triglycerides less than 150  Currently at goal   Continue current regimen   - POCT ECG    -   - Discussed recent cholesterol panel - TG were elevated  Recommend beginning Vascepa begin with 1000 mg bid and increase to 2000 mg bid  - Continue statin and Zetia  - Refer for sleep study to rule out KARL as he had hypoxia and apnea during recent dental procedure  - Continue current rx

## 2021-11-16 ENCOUNTER — OFFICE VISIT (OUTPATIENT)
Dept: AUDIOLOGY | Facility: CLINIC | Age: 71
End: 2021-11-16
Payer: COMMERCIAL

## 2021-11-16 ENCOUNTER — OFFICE VISIT (OUTPATIENT)
Dept: OTOLARYNGOLOGY | Facility: CLINIC | Age: 71
End: 2021-11-16
Payer: COMMERCIAL

## 2021-11-16 VITALS — HEIGHT: 65 IN | TEMPERATURE: 98 F | BODY MASS INDEX: 34.99 KG/M2 | WEIGHT: 210 LBS

## 2021-11-16 DIAGNOSIS — H61.22 LEFT EAR IMPACTED CERUMEN: ICD-10-CM

## 2021-11-16 DIAGNOSIS — H93.13 BILATERAL TINNITUS: ICD-10-CM

## 2021-11-16 DIAGNOSIS — H93.13 TINNITUS, BILATERAL: Primary | ICD-10-CM

## 2021-11-16 DIAGNOSIS — H90.3 SENSORINEURAL HEARING LOSS (SNHL), BILATERAL: Primary | ICD-10-CM

## 2021-11-16 DIAGNOSIS — H90.3 SENSORY HEARING LOSS, BILATERAL: ICD-10-CM

## 2021-11-16 PROCEDURE — 99204 OFFICE O/P NEW MOD 45 MIN: CPT | Performed by: NURSE PRACTITIONER

## 2021-11-16 PROCEDURE — 3008F BODY MASS INDEX DOCD: CPT | Performed by: NURSE PRACTITIONER

## 2021-11-16 PROCEDURE — 92567 TYMPANOMETRY: CPT | Performed by: AUDIOLOGIST

## 2021-11-16 PROCEDURE — 1160F RVW MEDS BY RX/DR IN RCRD: CPT | Performed by: NURSE PRACTITIONER

## 2021-11-16 PROCEDURE — 69210 REMOVE IMPACTED EAR WAX UNI: CPT | Performed by: NURSE PRACTITIONER

## 2021-11-16 PROCEDURE — 1036F TOBACCO NON-USER: CPT | Performed by: NURSE PRACTITIONER

## 2021-11-16 PROCEDURE — 92557 COMPREHENSIVE HEARING TEST: CPT | Performed by: AUDIOLOGIST

## 2021-11-23 ENCOUNTER — OFFICE VISIT (OUTPATIENT)
Dept: AUDIOLOGY | Facility: CLINIC | Age: 71
End: 2021-11-23

## 2021-11-23 DIAGNOSIS — H93.13 TINNITUS, BILATERAL: Primary | ICD-10-CM

## 2021-11-23 DIAGNOSIS — H90.3 SENSORY HEARING LOSS, BILATERAL: ICD-10-CM

## 2021-12-01 ENCOUNTER — CLINICAL SUPPORT (OUTPATIENT)
Dept: FAMILY MEDICINE CLINIC | Facility: CLINIC | Age: 71
End: 2021-12-01
Payer: COMMERCIAL

## 2021-12-01 DIAGNOSIS — Z23 NEED FOR VACCINATION: Primary | ICD-10-CM

## 2021-12-01 PROCEDURE — 90662 IIV NO PRSV INCREASED AG IM: CPT

## 2021-12-01 PROCEDURE — G0008 ADMIN INFLUENZA VIRUS VAC: HCPCS

## 2021-12-13 ENCOUNTER — VBI (OUTPATIENT)
Dept: ADMINISTRATIVE | Facility: OTHER | Age: 71
End: 2021-12-13

## 2021-12-21 ENCOUNTER — OFFICE VISIT (OUTPATIENT)
Dept: AUDIOLOGY | Facility: CLINIC | Age: 71
End: 2021-12-21
Payer: COMMERCIAL

## 2021-12-21 DIAGNOSIS — H90.3 SENSORY HEARING LOSS, BILATERAL: Primary | ICD-10-CM

## 2021-12-21 PROCEDURE — V5274 ALD UNSPECIFIED: HCPCS | Performed by: AUDIOLOGIST

## 2021-12-22 ENCOUNTER — TELEPHONE (OUTPATIENT)
Dept: CARDIOLOGY CLINIC | Facility: CLINIC | Age: 71
End: 2021-12-22

## 2022-01-04 ENCOUNTER — APPOINTMENT (OUTPATIENT)
Dept: RADIOLOGY | Facility: CLINIC | Age: 72
End: 2022-01-04
Payer: COMMERCIAL

## 2022-01-04 ENCOUNTER — OFFICE VISIT (OUTPATIENT)
Dept: OBGYN CLINIC | Facility: CLINIC | Age: 72
End: 2022-01-04
Payer: COMMERCIAL

## 2022-01-04 VITALS
WEIGHT: 218.6 LBS | DIASTOLIC BLOOD PRESSURE: 78 MMHG | SYSTOLIC BLOOD PRESSURE: 136 MMHG | BODY MASS INDEX: 36.42 KG/M2 | HEIGHT: 65 IN | HEART RATE: 76 BPM

## 2022-01-04 DIAGNOSIS — M25.512 LEFT SHOULDER PAIN, UNSPECIFIED CHRONICITY: ICD-10-CM

## 2022-01-04 DIAGNOSIS — M75.22 TENDINITIS OF LONG HEAD OF BICEPS BRACHII OF LEFT SHOULDER: ICD-10-CM

## 2022-01-04 DIAGNOSIS — M75.82 ROTATOR CUFF TENDINITIS, LEFT: Primary | ICD-10-CM

## 2022-01-04 DIAGNOSIS — M65.332 TRIGGER MIDDLE FINGER OF LEFT HAND: ICD-10-CM

## 2022-01-04 PROCEDURE — 3008F BODY MASS INDEX DOCD: CPT | Performed by: ORTHOPAEDIC SURGERY

## 2022-01-04 PROCEDURE — 20550 NJX 1 TENDON SHEATH/LIGAMENT: CPT | Performed by: ORTHOPAEDIC SURGERY

## 2022-01-04 PROCEDURE — 73030 X-RAY EXAM OF SHOULDER: CPT

## 2022-01-04 PROCEDURE — 3078F DIAST BP <80 MM HG: CPT | Performed by: ORTHOPAEDIC SURGERY

## 2022-01-04 PROCEDURE — 3075F SYST BP GE 130 - 139MM HG: CPT | Performed by: ORTHOPAEDIC SURGERY

## 2022-01-04 PROCEDURE — 1036F TOBACCO NON-USER: CPT | Performed by: ORTHOPAEDIC SURGERY

## 2022-01-04 PROCEDURE — 1160F RVW MEDS BY RX/DR IN RCRD: CPT | Performed by: ORTHOPAEDIC SURGERY

## 2022-01-04 PROCEDURE — 99214 OFFICE O/P EST MOD 30 MIN: CPT | Performed by: ORTHOPAEDIC SURGERY

## 2022-01-04 RX ORDER — TRIAMCINOLONE ACETONIDE 40 MG/ML
20 INJECTION, SUSPENSION INTRA-ARTICULAR; INTRAMUSCULAR
Status: COMPLETED | OUTPATIENT
Start: 2022-01-04 | End: 2022-01-04

## 2022-01-04 RX ORDER — LIDOCAINE HYDROCHLORIDE 10 MG/ML
0.5 INJECTION, SOLUTION INFILTRATION; PERINEURAL
Status: COMPLETED | OUTPATIENT
Start: 2022-01-04 | End: 2022-01-04

## 2022-01-04 RX ADMIN — TRIAMCINOLONE ACETONIDE 20 MG: 40 INJECTION, SUSPENSION INTRA-ARTICULAR; INTRAMUSCULAR at 15:42

## 2022-01-04 RX ADMIN — LIDOCAINE HYDROCHLORIDE 0.5 ML: 10 INJECTION, SOLUTION INFILTRATION; PERINEURAL at 15:42

## 2022-01-04 NOTE — PATIENT INSTRUCTIONS
Early Postoperative or Post Injury Shoulder Exercises   WHAT YOU NEED TO KNOW:   You may need to wait until your swelling and pain have gone down before you start to exercise  Do not start an exercise program before you talk to your healthcare provider  DISCHARGE INSTRUCTIONS:   Contact your healthcare provider if:   · You have sharp or worsening pain during exercise or at rest     · You have questions or concerns about your shoulder exercises  Before you exercise:  Warm up and stretch before you exercise  Walk or ride a stationary bike for 5 to 10 minutes to help you warm up  Stretching helps increase range of motion  It may also decrease muscle soreness and help prevent another injury  Your healthcare provider will tell you which of the following stretches to do:  · Crossover arm stretch:  Relax your shoulders  Hold your upper arm with the opposite hand  Pull your arm across your chest until you feel a stretch  Hold the stretch for 30 seconds  Return to the starting position  · Shoulder flexion stretch:  Stand facing a wall  Slowly walk your fingers up the wall until you feel a stretch  Hold the stretch for 30 seconds  Return to the starting position  · Sleeper stretch:  Lie on your injured side on a firm, flat surface  Bend the elbow of your injured arm 90° with your hand facing up  Use your arm that is not injured to slowly push your injured arm down  Stop when you feel a stretch at the back of your injured shoulder  Hold the stretch for 30 seconds  Slowly return to the starting position  How to perform exercises without a weight or an exercise band:   · Pendulum swings:  Lean forward and rest the arm that is not injured on a table  Do not round your back or lock your knees during the exercise  Let your other arm hang freely by your side  Gently swing your injured arm forward and backward, side to side, and in circles  · Shrugs:  Stand with your arms by your side   Gently lift your shoulders up to your ears and hold for 5 seconds  With your shoulders lifted, pinch your shoulder blades together  Hold for 5 seconds  Slowly return to the starting position  How to exercise with a weight:  Hold a weight with your arm slightly in front of your body  Slowly raise your arm to the side with your thumb pointing up or down as directed  Stop when you reach the level of your shoulder  Hold for as many seconds as directed  Slowly return to the starting position  How to exercise with an exercise band:   · Hold the exercise band with both hands in front of your body  Slowly raise your injured arm up and to the side with your thumb pointing up or down as directed  Stop when you reach the level of your shoulder  Hold for as many seconds as directed  Slowly return to the starting position  · Tie one end of the exercise band to a heavy object  Stand and hold the band in your hand  Bend your elbow  Keep your arm close to your side and pull the band straight back  Squeeze your shoulder blades together as you pull  Slowly return to the starting position  Follow up with your physical therapist as directed:  Write down your questions so you remember to ask them at your visits  © Copyright [employer] 2021 Information is for End User's use only and may not be sold, redistributed or otherwise used for commercial purposes  All illustrations and images included in CareNotes® are the copyrighted property of [employer]  or Marci Davila  The above information is an  only  It is not intended as medical advice for individual conditions or treatments  Talk to your doctor, nurse or pharmacist before following any medical regimen to see if it is safe and effective for you

## 2022-01-04 NOTE — PROGRESS NOTES
Assessment/Plan:  1  Rotator cuff tendinitis, left     2  Tendinitis of long head of biceps brachii of left shoulder     3  Trigger long finger of left hand  Hand/upper extremity injection: L long A1   4  Left shoulder pain, unspecified chronicity  XR shoulder 2+ vw left       Scribe Attestation    I,:  Hilary Camejo am acting as a scribe while in the presence of the attending physician :       I,:  Viola Irizarry MD personally performed the services described in this documentation    as scribed in my presence :             Upon evaluation and review of the left shoulder xrays taken today, I believe that Silver Lima is presenting with signs and symptoms consistent with left shoulder rotator cuff tendinitis and long head biceps tendinitis  I discussed with Silver Lima that for treatment of this condition, I recommend that he perform a physician directed home exercise program twice a day for treatment  I do not believe that the calcific density that I appreciated on the left shoulder xray today is large enough to warrant a referral for an ultrasound guided lavage  Silver Lima was agreeable to this and I provided him with a left shoulder physician directed home exercise program today  If he does not experience improvement of his left shoulder symptoms in about 6 weeks, then he is to follow up for repeat evaluation  For his left long finger trigger finger, I discussed with Nelson that since he experienced improvement with his symptoms previously, I would recommend a repeat steroid injection for his left long finger trigger finger  This was Alden's second steroid injection to this site  I discussed with Deion Brink that this would be the last left long finger trigger finger injection that I would provide him  If Nelson experiences a recurrence of his symptoms, then he would be a candidate to undergo a left hand long finger trigger finger release  Silver Lima was agreeable to this and received and tolerated the injection well   I will follow up with Griselda Carter in 3 weeks for his left long finger trigger finger and in 6 weeks for his left shoulder tendinitis if his symptoms worsen or fail to improve  Hand/upper extremity injection: L long A1  Universal Protocol:  Consent: Verbal consent obtained  Risks and benefits: risks, benefits and alternatives were discussed  Consent given by: patient  Time out: Immediately prior to procedure a "time out" was called to verify the correct patient, procedure, equipment, support staff and site/side marked as required  Patient understanding: patient states understanding of the procedure being performed  Site marked: the operative site was marked  Patient identity confirmed: verbally with patient    Supporting Documentation  Indications: pain   Procedure Details  Condition:trigger finger Location: long finger - L long A1   Preparation: Patient was prepped and draped in the usual sterile fashion  Needle size: 25 G  Ultrasound guidance: no  Approach: volar  Medications administered: 20 mg triamcinolone acetonide 40 mg/mL; 0 5 mL lidocaine 1 %    Patient tolerance: patient tolerated the procedure well with no immediate complications  Dressing:  Sterile dressing applied           Subjective:   Stephenie Miramontes is a 73 y o  male who presents to the office today for initial evaluation of his left shoulder  Orestes Avendaño states that he has been experiencing left shoulder pain for about 1 month which has been increasing over time  He describes his pain as sharp and occurs with continued overhead activities as well as with 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  For treatment of his left shoulder pain, Ray has applied CBD oil to the site which has provided him with some temporary relief  Concerning his left long finger, Griselda Carter states that he has received a steroid injection about a year ago to treat his left long finger trigger finger    He states that the injection provided him with significant improvement of his symptoms  He states that over this past year, he has continued to experience improvement of his symptoms and had 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 which has began to exacerbate his symptoms  Review of Systems   Constitutional: Negative for chills and fever  HENT: Negative for ear pain and sore throat  Eyes: Negative for pain and visual disturbance  Respiratory: Negative for cough and shortness of breath  Cardiovascular: Negative for chest pain and palpitations  Gastrointestinal: Negative for abdominal pain and vomiting  Genitourinary: Negative for dysuria and hematuria  Musculoskeletal: Positive for arthralgias  Negative for back pain  Skin: Negative for color change and rash  Neurological: Negative for seizures and syncope  All other systems reviewed and are negative          Past Medical History:   Diagnosis Date    Abnormal electrocardiogram     last assessed 12/19/13    Arthritis     hand and forearm    Chest pain     Coronary artery disease     Enthesopathy of elbow 02/15/2006    Epididymo-orchitis 07/10/2012    Hyperlipidemia     Hypertension     Obesity     Voice disturbance 05/27/2008       Past Surgical History:   Procedure Laterality Date    ANGIOPLASTY  2013    one stent RCA    APPENDECTOMY      CARPAL TUNNEL RELEASE Left 2018    CORONARY ANGIOPLASTY      CORONARY ANGIOPLASTY WITH STENT PLACEMENT      CORONARY ANGIOPLASTY WITH STENT PLACEMENT  2013    NH REVISE MEDIAN N/CARPAL TUNNEL SURG Left 8/13/2018    Procedure: RELEASE CARPAL TUNNEL;  Surgeon: Gabrielle Hathaway MD;  Location: [address] [place] [address];  Service: Orthopedics    NH REVISE MEDIAN N/CARPAL TUNNEL SURG Right 9/19/2019    Procedure: RELEASE CARPAL TUNNEL;  Surgeon: Gabrielle Hathaway MD;  [facility];  Service: Orthopedics       Family History   Problem Relation Age of Onset    Heart disease Mother     Hypertension Mother     Heart disease Father         CHF    Arthritis Brother  Obesity Brother     Hypertension Brother     No Known Problems Maternal Aunt     No Known Problems Maternal Uncle     No Known Problems Paternal Aunt     No Known Problems Paternal Uncle     No Known Problems Maternal Grandmother     No Known Problems Maternal Grandfather     No Known Problems Paternal Grandmother     No Known Problems Paternal Grandfather        Social History     Occupational History    Not on file   Tobacco Use    Smoking status: Never Smoker    Smokeless tobacco: Never Used   Vaping Use    Vaping Use: Never used   Substance and Sexual Activity    Alcohol use: No    Drug use: No    Sexual activity: Not on file         Current Outpatient Medications:     aspirin (ASPIRIN LOW DOSE) 81 MG tablet, Take 1 tablet by mouth daily Last dose is 8/7/18 , Disp: , Rfl:     atorvastatin (LIPITOR) 40 mg tablet, TAKE 1 TABLET BY MOUTH  DAILY AT BEDTIME, Disp: 90 tablet, Rfl: 3    Biotin 37390 MCG TABS, Take 5,000 mg by mouth daily, Disp: , Rfl:     calcium citrate (CALCITRATE) 950 MG tablet, Take 1 tablet by mouth daily, Disp: , Rfl:     Cholecalciferol (VITAMIN D3) 2000 units capsule, Take 2,000 Units by mouth daily  , Disp: , Rfl:     Coenzyme Q10 (CO Q-10) 400 MG CAPS, Take 1 capsule by mouth Daily, Disp: , Rfl:     cycloSPORINE (RESTASIS) 0 05 % ophthalmic emulsion, Administer to both eyes every 12 (twelve) hours  , Disp: , Rfl:     ezetimibe (ZETIA) 10 mg tablet, TAKE 1 TABLET BY MOUTH IN  THE MORNING, Disp: 90 tablet, Rfl: 3    lisinopril (ZESTRIL) 20 mg tablet, Take 1 tablet (20 mg total) by mouth daily, Disp: 90 tablet, Rfl: 3    metoprolol succinate (TOPROL-XL) 25 mg 24 hr tablet, TAKE 1 TABLET BY MOUTH  DAILY, Disp: 90 tablet, Rfl: 3    Misc Natural Products (OSTEO BI-FLEX/5-LOXIN ADVANCED) TABS, Take by mouth daily, Disp: , Rfl:     multivitamin-iron-minerals-folic acid (CENTRUM) chewable tablet, Chew 1 tablet daily, Disp: , Rfl:     tamsulosin (FLOMAX) 0 4 mg, TAKE 1 CAPSULE BY MOUTH  DAILY AT BEDTIME, Disp: 90 capsule, Rfl: 3    TURMERIC PO, Take by mouth, Disp: , Rfl:     Vascepa 1 g CAPS, TAKE 2 CAPSULES BY MOUTH  TWO TIMES A DAY, Disp: 360 capsule, Rfl: 3    calcium citrate-vitamin D (CITRACAL+D) 315-200 MG-UNIT per tablet, Take 1 tablet by mouth 2 (two) times a day (Patient not taking: Reported on 11/16/2021 ), Disp: , Rfl:     No Known Allergies    Objective:  Vitals:    01/04/22 1457   BP: 136/78   Pulse: 76       Left Hand Exam     Other   Erythema: absent  Scars: absent  Sensation: normal  Pulse: present    Comments:  Tender to palpation over the left long finger A1 pulley  Left Shoulder Exam     Range of Motion   Active abduction: 110   External rotation: 80   Forward flexion: 160   Internal rotation 0 degrees: L3     Muscle Strength   Left shoulder normal muscle strength: discomfort with external rotation and pain with abduction  Abduction: 4/5   Internal rotation: 5/5   External rotation: 5/5     Tests   Impingement: positive  Drop arm: positive    Other   Sensation: normal  Pulse: present     Comments:  Speed's: positive             Physical Exam  HENT:      Head: Normocephalic and atraumatic  Eyes:      General:         Right eye: No discharge  Left eye: No discharge  Extraocular Movements: Extraocular movements intact  Conjunctiva/sclera: Conjunctivae normal    Cardiovascular:      Rate and Rhythm: Normal rate  Pulmonary:      Effort: Pulmonary effort is normal  No respiratory distress  Musculoskeletal:         General: Tenderness present  Normal range of motion  Cervical back: Normal range of motion and neck supple  Skin:     General: Skin is warm and dry  Neurological:      Mental Status: He is alert and oriented to person, place, and time     Psychiatric:         Mood and Affect: Mood normal          Behavior: Behavior normal            I have personally reviewed pertinent films in PACS and my interpretation is as follows:    Review of the left shoulder xrays taken today demonstrate a small calcific density at the distal supraspinatus tendon  I do not appreciate any obvious acute osseous abnormalities, malalignments, or deformities

## 2022-01-19 DIAGNOSIS — N40.0 BPH WITHOUT URINARY OBSTRUCTION: ICD-10-CM

## 2022-01-19 DIAGNOSIS — E78.5 DYSLIPIDEMIA: ICD-10-CM

## 2022-01-19 RX ORDER — TAMSULOSIN HYDROCHLORIDE 0.4 MG/1
CAPSULE ORAL
Qty: 90 CAPSULE | Refills: 3 | Status: SHIPPED | OUTPATIENT
Start: 2022-01-19

## 2022-01-19 RX ORDER — ICOSAPENT ETHYL 1000 MG/1
CAPSULE ORAL
Qty: 360 CAPSULE | Refills: 3 | Status: SHIPPED | OUTPATIENT
Start: 2022-01-19

## 2022-01-28 ENCOUNTER — IMMUNIZATIONS (OUTPATIENT)
Dept: FAMILY MEDICINE CLINIC | Facility: HOSPITAL | Age: 72
End: 2022-01-28

## 2022-01-28 DIAGNOSIS — Z23 ENCOUNTER FOR IMMUNIZATION: Primary | ICD-10-CM

## 2022-01-28 PROCEDURE — 91306 COVID-19 MODERNA VACC 0.25 ML BOOSTER: CPT

## 2022-01-28 PROCEDURE — 0064A COVID-19 MODERNA VACC 0.25 ML BOOSTER: CPT

## 2022-03-12 DIAGNOSIS — E78.5 DYSLIPIDEMIA: ICD-10-CM

## 2022-03-12 DIAGNOSIS — I10 ESSENTIAL HYPERTENSION: ICD-10-CM

## 2022-03-12 RX ORDER — EZETIMIBE 10 MG/1
TABLET ORAL
Qty: 90 TABLET | Refills: 3 | Status: SHIPPED | OUTPATIENT
Start: 2022-03-12

## 2022-03-12 RX ORDER — ATORVASTATIN CALCIUM 40 MG/1
TABLET, FILM COATED ORAL
Qty: 90 TABLET | Refills: 3 | Status: SHIPPED | OUTPATIENT
Start: 2022-03-12

## 2022-03-12 RX ORDER — LISINOPRIL 20 MG/1
TABLET ORAL
Qty: 90 TABLET | Refills: 3 | Status: SHIPPED | OUTPATIENT
Start: 2022-03-12

## 2022-03-17 ENCOUNTER — TELEPHONE (OUTPATIENT)
Dept: FAMILY MEDICINE CLINIC | Facility: CLINIC | Age: 72
End: 2022-03-17

## 2022-03-21 ENCOUNTER — TELEPHONE (OUTPATIENT)
Dept: FAMILY MEDICINE CLINIC | Facility: CLINIC | Age: 72
End: 2022-03-21

## 2022-04-11 ENCOUNTER — TELEPHONE (OUTPATIENT)
Dept: FAMILY MEDICINE CLINIC | Facility: CLINIC | Age: 72
End: 2022-04-11

## 2022-04-11 NOTE — TELEPHONE ENCOUNTER
Patient scheduled AWV for May and would like his order for blood work prior to visit  Please advise when ready for pickup

## 2022-04-26 ENCOUNTER — 1 YEAR FOLLOW-UP (OUTPATIENT)
Dept: URBAN - METROPOLITAN AREA CLINIC 27 | Facility: CLINIC | Age: 72
End: 2022-04-26

## 2022-04-26 DIAGNOSIS — H52.13: ICD-10-CM

## 2022-04-26 DIAGNOSIS — H43.813: ICD-10-CM

## 2022-04-26 DIAGNOSIS — H25.9: ICD-10-CM

## 2022-04-26 DIAGNOSIS — H43.22: ICD-10-CM

## 2022-04-26 DIAGNOSIS — H35.371: ICD-10-CM

## 2022-04-26 LAB
ALBUMIN SERPL-MCNC: 4.4 G/DL (ref 3.8–4.8)
ALBUMIN/GLOB SERPL: 1.8 {RATIO} (ref 1.2–2.2)
ALP SERPL-CCNC: 41 IU/L (ref 44–121)
ALT SERPL-CCNC: 29 IU/L (ref 0–44)
AST SERPL-CCNC: 33 IU/L (ref 0–40)
BILIRUB SERPL-MCNC: 0.7 MG/DL (ref 0–1.2)
BUN SERPL-MCNC: 15 MG/DL (ref 8–27)
BUN/CREAT SERPL: 17 (ref 10–24)
CALCIUM SERPL-MCNC: 9.2 MG/DL (ref 8.6–10.2)
CHLORIDE SERPL-SCNC: 104 MMOL/L (ref 96–106)
CHOLEST SERPL-MCNC: 119 MG/DL (ref 100–199)
CO2 SERPL-SCNC: 21 MMOL/L (ref 20–29)
CREAT SERPL-MCNC: 0.88 MG/DL (ref 0.76–1.27)
EGFR: 93 ML/MIN/1.73
GLOBULIN SER-MCNC: 2.5 G/DL (ref 1.5–4.5)
GLUCOSE SERPL-MCNC: 96 MG/DL (ref 65–99)
HBA1C MFR BLD: 6.1 % (ref 4.8–5.6)
HDLC SERPL-MCNC: 37 MG/DL
LDLC SERPL CALC-MCNC: 64 MG/DL (ref 0–99)
MICRODELETION SYND BLD/T FISH: NORMAL
POTASSIUM SERPL-SCNC: 5 MMOL/L (ref 3.5–5.2)
PROT SERPL-MCNC: 6.9 G/DL (ref 6–8.5)
SL AMB VLDL CHOLESTEROL CALC: 18 MG/DL (ref 5–40)
SODIUM SERPL-SCNC: 141 MMOL/L (ref 134–144)
TRIGL SERPL-MCNC: 93 MG/DL (ref 0–149)

## 2022-04-26 PROCEDURE — 92014 COMPRE OPH EXAM EST PT 1/>: CPT

## 2022-04-26 PROCEDURE — 92134 CPTRZ OPH DX IMG PST SGM RTA: CPT

## 2022-04-26 ASSESSMENT — VISUAL ACUITY
OD_CC: 20/25-2
OS_PH: 20/25+1
OS_CC: 20/30+1
OD_PH: 20/20-1

## 2022-04-26 ASSESSMENT — TONOMETRY
OS_IOP_MMHG: 20
OD_IOP_MMHG: 21

## 2022-04-27 ENCOUNTER — RA CDI HCC (OUTPATIENT)
Dept: OTHER | Facility: HOSPITAL | Age: 72
End: 2022-04-27

## 2022-04-27 NOTE — PROGRESS NOTES
[facility]  coding opportunities       Chart reviewed, no opportunity found:   [address]        Patients Insurance     Medicare Insurance: [employer] Advantage

## 2022-05-04 ENCOUNTER — OFFICE VISIT (OUTPATIENT)
Dept: FAMILY MEDICINE CLINIC | Facility: CLINIC | Age: 72
End: 2022-05-04
Payer: COMMERCIAL

## 2022-05-04 VITALS
SYSTOLIC BLOOD PRESSURE: 110 MMHG | DIASTOLIC BLOOD PRESSURE: 60 MMHG | WEIGHT: 215 LBS | HEIGHT: 65 IN | RESPIRATION RATE: 14 BRPM | BODY MASS INDEX: 35.82 KG/M2 | HEART RATE: 78 BPM | TEMPERATURE: 98.2 F

## 2022-05-04 DIAGNOSIS — Z23 IMMUNIZATION DUE: ICD-10-CM

## 2022-05-04 DIAGNOSIS — Z12.11 SCREENING FOR COLON CANCER: ICD-10-CM

## 2022-05-04 DIAGNOSIS — Z00.00 MEDICARE ANNUAL WELLNESS VISIT, SUBSEQUENT: Primary | ICD-10-CM

## 2022-05-04 PROBLEM — H61.22 LEFT EAR IMPACTED CERUMEN: Status: RESOLVED | Noted: 2021-11-16 | Resolved: 2022-05-04

## 2022-05-04 PROCEDURE — 1101F PT FALLS ASSESS-DOCD LE1/YR: CPT | Performed by: FAMILY MEDICINE

## 2022-05-04 PROCEDURE — G0439 PPPS, SUBSEQ VISIT: HCPCS | Performed by: FAMILY MEDICINE

## 2022-05-04 PROCEDURE — 1125F AMNT PAIN NOTED PAIN PRSNT: CPT | Performed by: FAMILY MEDICINE

## 2022-05-04 PROCEDURE — 3725F SCREEN DEPRESSION PERFORMED: CPT | Performed by: FAMILY MEDICINE

## 2022-05-04 PROCEDURE — 1170F FXNL STATUS ASSESSED: CPT | Performed by: FAMILY MEDICINE

## 2022-05-04 PROCEDURE — 90732 PPSV23 VACC 2 YRS+ SUBQ/IM: CPT

## 2022-05-04 PROCEDURE — G0009 ADMIN PNEUMOCOCCAL VACCINE: HCPCS

## 2022-05-04 PROCEDURE — 3288F FALL RISK ASSESSMENT DOCD: CPT | Performed by: FAMILY MEDICINE

## 2022-05-04 NOTE — PATIENT INSTRUCTIONS
Medicare Preventive Visit Patient Instructions  Thank you for completing your Welcome to Medicare Visit or Medicare Annual Wellness Visit today  Your next wellness visit will be due in one year (5/5/2023)  The screening/preventive services that you may require over the next 5-10 years are detailed below  Some tests may not apply to you based off risk factors and/or age  Screening tests ordered at today's visit but not completed yet may show as past due  Also, please note that scanned in results may not display below  Preventive Screenings:  Service Recommendations Previous Testing/Comments   Colorectal Cancer Screening  · Colonoscopy    · Fecal Occult Blood Test (FOBT)/Fecal Immunochemical Test (FIT)  · Fecal DNA/Cologuard Test  · Flexible Sigmoidoscopy Age: 54-65 years old   Colonoscopy: every 10 years (May be performed more frequently if at higher risk)  OR  FOBT/FIT: every 1 year  OR  Cologuard: every 3 years  OR  Sigmoidoscopy: every 5 years  Screening may be recommended earlier than age 48 if at higher risk for colorectal cancer  Also, an individualized decision between you and your healthcare provider will decide whether screening between the ages of 74-80 would be appropriate  Colonoscopy: Not on file  FOBT/FIT: Not on file  Cologuard: Not on file  Sigmoidoscopy: Not on file          Prostate Cancer Screening Individualized decision between patient and health care provider in men between ages of 53-78   Medicare will cover every 12 months beginning on the day after your 50th birthday PSA: No results in last 5 years           Hepatitis C Screening Once for adults born between [facility]  More frequently in patients at high risk for Hepatitis C Hep C Antibody: Not on file        Diabetes Screening 1-2 times per year if you're at risk for diabetes or have pre-diabetes Fasting glucose: 103 mg/dL   A1C: 6 1 %        Cholesterol Screening Once every 5 years if you don't have a lipid disorder   May order more often based on risk factors  Lipid panel: 04/25/2022           Other Preventive Screenings Covered by Medicare:  1  Abdominal Aortic Aneurysm (AAA) Screening: covered once if your at risk  You're considered to be at risk if you have a family history of AAA or a male between the age of 73-68 who smoking at least 100 cigarettes in your lifetime  2  Lung Cancer Screening: covers low dose CT scan once per year if you meet all of the following conditions: (1) Age 50-69; (2) No signs or symptoms of lung cancer; (3) Current smoker or have quit smoking within the last 15 years; (4) You have a tobacco smoking history of at least 30 pack years (packs per day x number of years you smoked); (5) You get a written order from a healthcare provider  3  Glaucoma Screening: covered annually if you're considered high risk: (1) You have diabetes OR (2) Family history of glaucoma OR (3)  aged 48 and older OR (3)  American aged 72 and older  3  Osteoporosis Screening: covered every 2 years if you meet one of the following conditions: (1) Have a vertebral abnormality; (2) On glucocorticoid therapy for more than 3 months; (3) Have primary hyperparathyroidism; (4) On osteoporosis medications and need to assess response to drug therapy  5  HIV Screening: covered annually if you're between the age of 12-76  Also covered annually if you are younger than 13 and older than 72 with risk factors for HIV infection  For pregnant patients, it is covered up to 3 times per pregnancy      Immunizations:  Immunization Recommendations   Influenza Vaccine Annual influenza vaccination during flu season is recommended for all persons aged >= 6 months who do not have contraindications   Pneumococcal Vaccine (Prevnar and Pneumovax)  * Prevnar = PCV13  * Pneumovax = PPSV23 Adults 25-60 years old: 1-3 doses may be recommended based on certain risk factors  Adults 72 years old: Prevnar (PCV13) vaccine recommended followed by Pneumovax (PPSV23) vaccine  If already received PPSV23 since turning 65, then PCV13 recommended at least one year after PPSV23 dose  Hepatitis B Vaccine 3 dose series if at intermediate or high risk (ex: diabetes, end stage renal disease, liver disease)   Tetanus (Td) Vaccine - COST NOT COVERED BY MEDICARE PART B Following completion of primary series, a booster dose should be given every 10 years to maintain immunity against tetanus  Td may also be given as tetanus wound prophylaxis  Tdap Vaccine - COST NOT COVERED BY MEDICARE PART B Recommended at least once for all adults  For pregnant patients, recommended with each pregnancy  Shingles Vaccine (Shingrix) - COST NOT COVERED BY MEDICARE PART B  2 shot series recommended in those aged 48 and above     Health Maintenance Due:      Topic Date Due    Hepatitis C Screening  Never done    Colorectal Cancer Screening  Never done     Immunizations Due:      Topic Date Due    Pneumococcal Vaccine: 65+ Years (1 of 2 - PPSV23) 07/31/2019     Advance Directives   What are advance directives? Advance directives are legal documents that state your wishes and plans for medical care  These plans are made ahead of time in case you lose your ability to make decisions for yourself  Advance directives can apply to any medical decision, such as the treatments you want, and if you want to donate organs  What are the types of advance directives? There are many types of advance directives, and each state has rules about how to use them  You may choose a combination of any of the following:  · Living will: This is a written record of the treatment you want  You can also choose which treatments you do not want, which to limit, and which to stop at a certain time  This includes surgery, medicine, IV fluid, and tube feedings  · Durable power of  for healthcare [facility]):   This is a written record that states who you want to make healthcare choices for you when you are unable to make them for yourself  This person, called a proxy, is usually a family member or a friend  You may choose more than 1 proxy  · Do not resuscitate (DNR) order:  A DNR order is used in case your heart stops beating or you stop breathing  It is a request not to have certain forms of treatment, such as CPR  A DNR order may be included in other types of advance directives  · Medical directive: This covers the care that you want if you are in a coma, near death, or unable to make decisions for yourself  You can list the treatments you want for each condition  Treatment may include pain medicine, surgery, blood transfusions, dialysis, IV or tube feedings, and a ventilator (breathing machine)  · Values history: This document has questions about your views, beliefs, and how you feel and think about life  This information can help others choose the care that you would choose  Why are advance directives important? An advance directive helps you control your care  Although spoken wishes may be used, it is better to have your wishes written down  Spoken wishes can be misunderstood, or not followed  Treatments may be given even if you do not want them  An advance directive may make it easier for your family to make difficult choices about your care  Weight Management   Why it is important to manage your weight:  Being overweight increases your risk of health conditions such as heart disease, high blood pressure, type 2 diabetes, and certain types of cancer  It can also increase your risk for osteoarthritis, sleep apnea, and other respiratory problems  Aim for a slow, steady weight loss  Even a small amount of weight loss can lower your risk of health problems  How to lose weight safely:  A safe and healthy way to lose weight is to eat fewer calories and get regular exercise  You can lose up about 1 pound a week by decreasing the number of calories you eat by 500 calories each day     Healthy meal plan for weight management:  A healthy meal plan includes a variety of foods, contains fewer calories, and helps you stay healthy  A healthy meal plan includes the following:  · Eat whole-grain foods more often  A healthy meal plan should contain fiber  Fiber is the part of grains, fruits, and vegetables that is not broken down by your body  Whole-grain foods are healthy and provide extra fiber in your diet  Some examples of whole-grain foods are whole-wheat breads and pastas, oatmeal, brown rice, and bulgur  · Eat a variety of vegetables every day  Include dark, leafy greens such as spinach, kale, shivani greens, and mustard greens  Eat yellow and orange vegetables such as carrots, sweet potatoes, and winter squash  · Eat a variety of fruits every day  Choose fresh or canned fruit (canned in its own juice or light syrup) instead of juice  Fruit juice has very little or no fiber  · Eat low-fat dairy foods  Drink fat-free (skim) milk or 1% milk  Eat fat-free yogurt and low-fat cottage cheese  Try low-fat cheeses such as mozzarella and other reduced-fat cheeses  · Choose meat and other protein foods that are low in fat  Choose beans or other legumes such as split peas or lentils  Choose fish, skinless poultry (chicken or turkey), or lean cuts of red meat (beef or pork)  Before you cook meat or poultry, cut off any visible fat  · Use less fat and oil  Try baking foods instead of frying them  Add less fat, such as margarine, sour cream, regular salad dressing and mayonnaise to foods  Eat fewer high-fat foods  Some examples of high-fat foods include french fries, doughnuts, ice cream, and cakes  · Eat fewer sweets  Limit foods and drinks that are high in sugar  This includes candy, cookies, regular soda, and sweetened drinks  Exercise:  Exercise at least 30 minutes per day on most days of the week  Some examples of exercise include walking, biking, dancing, and swimming   You can also fit in more physical activity by taking the stairs instead of the elevator or parking farther away from stores  Ask your healthcare provider about the best exercise plan for you  © Copyright [employer] 2018 Information is for End User's use only and may not be sold, redistributed or otherwise used for commercial purposes   All illustrations and images included in CareNotes® are the copyrighted property of [employer]  or [address] Results (from the past 672 hour(s))   Comprehensive metabolic panel    Collection Time: 04/25/22  8:23 AM   Result Value Ref Range    Glucose, Random 96 65 - 99 mg/dL    BUN 15 8 - 27 mg/dL    Creatinine 0 88 0 76 - 1 27 mg/dL    eGFR 93 >59 mL/min/1 73    SL AMB BUN/CREATININE RATIO 17 10 - 24    Sodium 141 134 - 144 mmol/L    Potassium 5 0 3 5 - 5 2 mmol/L    Chloride 104 96 - 106 mmol/L    CO2 21 20 - 29 mmol/L    CALCIUM 9 2 8 6 - 10 2 mg/dL    Protein, Total 6 9 6 0 - 8 5 g/dL    Albumin 4 4 3 8 - 4 8 g/dL    Globulin, Total 2 5 1 5 - 4 5 g/dL    Albumin/Globulin Ratio 1 8 1 2 - 2 2    TOTAL BILIRUBIN 0 7 0 0 - 1 2 mg/dL    Alk Phos Isoenzymes 41 (L) 44 - 121 IU/L    AST 33 0 - 40 IU/L    ALT 29 0 - 44 IU/L   Lipid panel    Collection Time: 04/25/22  8:23 AM   Result Value Ref Range    Cholesterol, Total 119 100 - 199 mg/dL    Triglycerides 93 0 - 149 mg/dL    HDL 37 (L) >39 mg/dL    VLDL Cholesterol Calculated 18 5 - 40 mg/dL    LDL Calculated 64 0 - 99 mg/dL   Hemoglobin A1c (w/out EAG)    Collection Time: 04/25/22  8:23 AM   Result Value Ref Range    Hemoglobin A1C 6 1 (H) 4 8 - 5 6 %   Cardiovascular Report    Collection Time: 04/25/22  8:23 AM   Result Value Ref Range    Interpretation Note

## 2022-05-04 NOTE — PROGRESS NOTES
Assessment and Plan:     Problem List Items Addressed This Visit     None      Visit Diagnoses     Screening for colon cancer    -  Primary    Relevant Orders    Ambulatory referral to Gastroenterology    Immunization due        Medicare annual wellness visit, subsequent               Preventive health issues were discussed with patient, and age appropriate screening tests were ordered as noted in patient's After Visit Summary  Personalized health advice and appropriate referrals for health education or preventive services given if needed, as noted in patient's After Visit Summary       History of Present Illness:     Patient presents for Medicare Annual Wellness visit    Patient Care Team:  Heather Gay MD as PCP - General  MD Yobani Thomas DO     Problem List:     Patient Active Problem List   Diagnosis    Essential hypertension    CAD in native artery    Obesity (BMI 35 0-39 9 without comorbidity)    Enlarged prostate without lower urinary tract symptoms (luts)    Ischemic heart disease, chronic    Dyslipidemia    S/P right coronary artery (RCA) stent placement    Carpal tunnel syndrome on right    Class 2 obesity due to excess calories without serious comorbidity with body mass index (BMI) of 36 0 to 36 9 in adult    Prediabetes    Sensorineural hearing loss (SNHL), bilateral    Bilateral tinnitus      Past Medical and Surgical History:     Past Medical History:   Diagnosis Date    Abnormal electrocardiogram     last assessed 12/19/13    Arthritis     hand and forearm    Chest pain     Coronary artery disease     Enthesopathy of elbow 02/15/2006    Epididymo-orchitis 07/10/2012    Hyperlipidemia     Hypertension     Obesity     Voice disturbance 05/27/2008     Past Surgical History:   Procedure Laterality Date    ANGIOPLASTY  2013    one stent RCA    APPENDECTOMY      CARPAL TUNNEL RELEASE Left 2018    CORONARY ANGIOPLASTY      CORONARY ANGIOPLASTY WITH STENT PLACEMENT      CORONARY ANGIOPLASTY WITH STENT PLACEMENT  2013    SC REVISE MEDIAN N/CARPAL TUNNEL SURG Left 8/13/2018    Procedure: RELEASE CARPAL TUNNEL;  Surgeon: Juliana Appiah MD;  Location: [facility];  Service: Orthopedics    SC REVISE MEDIAN N/CARPAL TUNNEL SURG Right 9/19/2019    Procedure: RELEASE CARPAL TUNNEL;  Surgeon: Juliana Appiah MD;  Location: [facility];  Service: Orthopedics      Family History:     Family History   Problem Relation Age of Onset    Heart disease Mother     Hypertension Mother     Heart disease Father         CHF    Arthritis Brother     Obesity Brother     Hypertension Brother     No Known Problems Maternal Aunt     No Known Problems Maternal Uncle     No Known Problems Paternal Aunt     No Known Problems Paternal Uncle     No Known Problems Maternal Grandmother     No Known Problems Maternal Grandfather     No Known Problems Paternal Grandmother     No Known Problems Paternal Grandfather       Social History:     Social History     Socioeconomic History    Marital status: /Civil Union     Spouse name: None    Number of children: None    Years of education: None    Highest education level: None   Occupational History    None   Tobacco Use    Smoking status: Never Smoker    Smokeless tobacco: Never Used   Vaping Use    Vaping Use: Never used   Substance and Sexual Activity    Alcohol use: No    Drug use: No    Sexual activity: None   Other Topics Concern    None   Social History Narrative    None     Social Determinants of Health     Financial Resource Strain: Not on file   Food Insecurity: Not on file   Transportation Needs: Not on file   Physical Activity: Not on file   Stress: Not on file   Social Connections: Not on file   Intimate Partner Violence: Not on file   Housing Stability: Not on file      Medications and Allergies:     Current Outpatient Medications   Medication Sig Dispense Refill    aspirin (ASPIRIN LOW DOSE) 81 MG tablet Take 1 tablet by mouth daily Last dose is 8/7/18       atorvastatin (LIPITOR) 40 mg tablet TAKE 1 TABLET BY MOUTH  DAILY AT BEDTIME 90 tablet 3    Biotin 12076 MCG TABS Take 5,000 mg by mouth daily      calcium citrate (CALCITRATE) 950 MG tablet Take 1 tablet by mouth daily      Cholecalciferol (VITAMIN D3) 2000 units capsule Take 2,000 Units by mouth daily        Coenzyme Q10 (CO Q-10) 400 MG CAPS Take 1 capsule by mouth Daily      cycloSPORINE (RESTASIS) 0 05 % ophthalmic emulsion Administer to both eyes every 12 (twelve) hours        ezetimibe (ZETIA) 10 mg tablet TAKE 1 TABLET BY MOUTH IN  THE MORNING 90 tablet 3    lisinopril (ZESTRIL) 20 mg tablet TAKE 1 TABLET BY MOUTH  DAILY 90 tablet 3    metoprolol succinate (TOPROL-XL) 25 mg 24 hr tablet TAKE 1 TABLET BY MOUTH  DAILY 90 tablet 3    Misc Natural Products (OSTEO BI-FLEX/5-LOXIN ADVANCED) TABS Take by mouth daily      multivitamin-iron-minerals-folic acid (CENTRUM) chewable tablet Chew 1 tablet daily      tamsulosin (FLOMAX) 0 4 mg TAKE 1 CAPSULE BY MOUTH  DAILY AT BEDTIME 90 capsule 3    TURMERIC PO Take by mouth      Vascepa 1 g CAPS TAKE 2 CAPSULES BY MOUTH  TWICE DAILY 360 capsule 3    calcium citrate-vitamin D (CITRACAL+D) 315-200 MG-UNIT per tablet Take 1 tablet by mouth 2 (two) times a day (Patient not taking: Reported on 11/16/2021 )       No current facility-administered medications for this visit       No Known Allergies   Immunizations:     Immunization History   Administered Date(s) Administered    COVID-19 MODERNA VACC 0 25 ML IM BOOSTER 01/28/2022    COVID-19 MODERNA VACC 0 5 ML IM 02/11/2021, 03/11/2021    Influenza Quadrivalent Preservative Free 3 years and older IM 11/09/2015, 10/19/2016    Influenza, high dose seasonal 0 7 mL 11/04/2019, 12/14/2020, 12/01/2021    Influenza, injectable, quadrivalent, preservative free 0 5 mL 11/05/2018    Influenza, seasonal, injectable 11/21/2014, 11/03/2017    Pneumococcal Conjugate 13-Valent 06/05/2019    Tdap 08/10/2007, 11/20/2017      Health Maintenance:         Topic Date Due    Hepatitis C Screening  Never done    Colorectal Cancer Screening  Never done         Topic Date Due    Pneumococcal Vaccine: 65+ Years (1 of 2 - PPSV23) 07/31/2019      Medicare Health Risk Assessment:     /60 (BP Location: Left arm, Patient Position: Sitting, Cuff Size: Adult)   Pulse 78   Temp 98 2 °F (36 8 °C) (Temporal)   Resp 14   Ht 5' 5" (1 651 m)   Wt 97 5 kg (215 lb)   BMI 35 78 kg/m²      Manny Cox is here for his Subsequent Wellness visit  Health Risk Assessment:   Patient rates overall health as very good  Patient feels that their physical health rating is same  Patient is satisfied with their life  Eyesight was rated as same  Hearing was rated as same  Patient feels that their emotional and mental health rating is same  Patients states they are never, rarely angry  Patient states they are sometimes unusually tired/fatigued  Pain experienced in the last 7 days has been some  Patient's pain rating has been 4/10  Patient states that he has experienced no weight loss or gain in last 6 months  Depression Screening:   PHQ-2 Score: 0      Fall Risk Screening: In the past year, patient has experienced: no history of falling in past year      Home Safety:  Patient does not have trouble with stairs inside or outside of their home  Patient has working smoke alarms and has working carbon monoxide detector  Home safety hazards include: none  Nutrition:   Current diet is Regular  Medications:   Patient is currently taking over-the-counter supplements  OTC medications include: see medication list  Patient is able to manage medications  Activities of Daily Living (ADLs)/Instrumental Activities of Daily Living (IADLs):   Walk and transfer into and out of bed and chair?: Yes  Dress and groom yourself?: Yes    Bathe or shower yourself?: Yes    Feed yourself?  Yes  Do your laundry/housekeeping?: Yes  Manage your money, pay your bills and track your expenses?: Yes  Make your own meals?: Yes    Do your own shopping?: Yes    Previous Hospitalizations:   Any hospitalizations or ED visits within the last 12 months?: No      Advance Care Planning:   Living will: No    Durable POA for healthcare: No    Advanced directive: No    Advanced directive counseling given: Yes      Cognitive Screening:   Provider or family/friend/caregiver concerned regarding cognition?: No    PREVENTIVE SCREENINGS      Cardiovascular Screening:    General: Screening Current      Diabetes Screening:     General: Screening Current      Colorectal Cancer Screening:       Due for: Colonoscopy - Low Risk      Prostate Cancer Screening:    General: Screening Not Indicated      Osteoporosis Screening:    General: Screening Not Indicated      Abdominal Aortic Aneurysm (AAA) Screening:    Risk factors include: age between 73-67 yo        General: Screening Not Indicated      Lung Cancer Screening:     General: Screening Not Indicated      Hepatitis C Screening:    General: Screening Not Indicated    Screening, Brief Intervention, and Referral to Treatment (SBIRT)    Screening  Typical number of drinks in a day: 0  Typical number of drinks in a week: 0  Interpretation: Low risk drinking behavior      Single Item Drug Screening:  How often have you used an illegal drug (including marijuana) or a prescription medication for non-medical reasons in the past year? never    Single Item Drug Screen Score: 0  Interpretation: Negative screen for possible drug use disorder      Lucio Wong MD

## 2022-05-07 ENCOUNTER — NURSE TRIAGE (OUTPATIENT)
Dept: OTHER | Facility: OTHER | Age: 72
End: 2022-05-07

## 2022-05-07 NOTE — TELEPHONE ENCOUNTER
Patient reports a cough and cold symptoms that developed after he received his pneumonia vaccine on 5/4  He would like to rule out covid since he is around people a lot  I offered to place the test order and have [facility] now however he would prefer to be seen in his PCP office on Monday  Will forward to office to help facilitate an appointment  Only "on hold" spots available in schedule  Please call patient for appt on Monday 5/9  Thank you

## 2022-05-07 NOTE — TELEPHONE ENCOUNTER
Reason for Disposition   Pneumococcal vaccine reactions    Answer Assessment - Initial Assessment Questions  1  SYMPTOMS: "What is the main symptom?" (e g , redness, swelling, pain)       Cough and cold symptoms  2  ONSET: "When was the vaccine (shot) given?" "How much later did the cold symptoms begin?" (e g , hours, days ago)       The next day  3  SEVERITY: "How bad is it?"       mild  4  FEVER: "Is there a fever?" If Yes, ask: "What is it, how was it measured, and when did it start?"       denies  5  IMMUNIZATIONS GIVEN: "What shots have you recently received?"      Pneumonia vaccine  6  PAST REACTIONS: "Have you reacted to immunizations before?" If Yes, ask: "What happened?"      denies  7   OTHER SYMPTOMS: "Do you have any other symptoms?"      Cough, congestion    Protocols used: IMMUNIZATION REACTIONS-ADULT-

## 2022-05-07 NOTE — TELEPHONE ENCOUNTER
Regarding: cough head cold symptoms advice  ----- Message from Ar Grant sent at 5/7/2022 11:48 AM EDT -----  "I came in on Wednesday for my pneumonia shot and then developed a cough with head cold symptoms  "

## 2022-05-09 ENCOUNTER — OFFICE VISIT (OUTPATIENT)
Dept: FAMILY MEDICINE CLINIC | Facility: CLINIC | Age: 72
End: 2022-05-09
Payer: COMMERCIAL

## 2022-05-09 VITALS
TEMPERATURE: 98.2 F | BODY MASS INDEX: 36.82 KG/M2 | HEIGHT: 65 IN | RESPIRATION RATE: 14 BRPM | HEART RATE: 78 BPM | SYSTOLIC BLOOD PRESSURE: 130 MMHG | WEIGHT: 221 LBS | DIASTOLIC BLOOD PRESSURE: 70 MMHG

## 2022-05-09 DIAGNOSIS — R05.9 COUGH: Primary | ICD-10-CM

## 2022-05-09 PROCEDURE — 1036F TOBACCO NON-USER: CPT | Performed by: FAMILY MEDICINE

## 2022-05-09 PROCEDURE — 99213 OFFICE O/P EST LOW 20 MIN: CPT | Performed by: FAMILY MEDICINE

## 2022-05-09 PROCEDURE — 3078F DIAST BP <80 MM HG: CPT | Performed by: FAMILY MEDICINE

## 2022-05-09 PROCEDURE — 3075F SYST BP GE 130 - 139MM HG: CPT | Performed by: FAMILY MEDICINE

## 2022-05-09 PROCEDURE — 1160F RVW MEDS BY RX/DR IN RCRD: CPT | Performed by: FAMILY MEDICINE

## 2022-05-09 PROCEDURE — 4040F PNEUMOC VAC/ADMIN/RCVD: CPT | Performed by: FAMILY MEDICINE

## 2022-05-09 PROCEDURE — 3008F BODY MASS INDEX DOCD: CPT | Performed by: FAMILY MEDICINE

## 2022-05-09 RX ORDER — AZITHROMYCIN 250 MG/1
TABLET, FILM COATED ORAL
Qty: 6 TABLET | Refills: 0 | Status: SHIPPED | OUTPATIENT
Start: 2022-05-09 | End: 2022-05-13

## 2022-05-09 NOTE — PROGRESS NOTES
Chief Complaint   Patient presents with    Cough     productive cough and blowing nose started last Wed        Patient ID: Ramon Shaffer is a 73 y o  male  URI   This is a new problem  The current episode started in the past 7 days  The problem has been gradually improving  There has been no fever  Associated symptoms include congestion, coughing and rhinorrhea  Pertinent negatives include no abdominal pain, chest pain, diarrhea, dysuria, ear pain, headaches, joint pain, joint swelling, nausea, neck pain, plugged ear sensation, rash, sinus pain, sneezing, sore throat, swollen glands, vomiting or wheezing  He has tried decongestant for the symptoms  The treatment provided no relief     had Pneumovax 5 days ago  -  Symptoms started the evening on the same day   Had COVID vaccines and booster 4 m ago     The following portions of the patient's history were reviewed and updated as appropriate: allergies, current medications, past family history, past medical history, past social history, past surgical history and problem list     Review of Systems   Constitutional: Negative  HENT: Positive for congestion, rhinorrhea and sinus pressure  Negative for ear pain, sinus pain, sneezing, sore throat, trouble swallowing and voice change  Respiratory: Positive for cough  Negative for chest tightness, shortness of breath and wheezing  Cardiovascular: Negative  Negative for chest pain  Gastrointestinal: Negative  Negative for abdominal pain, diarrhea, nausea and vomiting  Genitourinary: Negative for dysuria  Musculoskeletal: Negative for joint pain and neck pain  Skin: Negative for rash  Neurological: Negative for headaches         Current Outpatient Medications   Medication Sig Dispense Refill    aspirin (ASPIRIN LOW DOSE) 81 MG tablet Take 1 tablet by mouth daily Last dose is 8/7/18       atorvastatin (LIPITOR) 40 mg tablet TAKE 1 TABLET BY MOUTH  DAILY AT BEDTIME 90 tablet 3    Biotin 01300 MCG TABS Take 5,000 mg by mouth daily      calcium citrate (CALCITRATE) 950 MG tablet Take 1 tablet by mouth daily      Cholecalciferol (VITAMIN D3) 2000 units capsule Take 2,000 Units by mouth daily        Coenzyme Q10 (CO Q-10) 400 MG CAPS Take 1 capsule by mouth Daily      cycloSPORINE (RESTASIS) 0 05 % ophthalmic emulsion Administer to both eyes every 12 (twelve) hours        ezetimibe (ZETIA) 10 mg tablet TAKE 1 TABLET BY MOUTH IN  THE MORNING 90 tablet 3    lisinopril (ZESTRIL) 20 mg tablet TAKE 1 TABLET BY MOUTH  DAILY 90 tablet 3    metoprolol succinate (TOPROL-XL) 25 mg 24 hr tablet TAKE 1 TABLET BY MOUTH  DAILY 90 tablet 3    Misc Natural Products (OSTEO BI-FLEX/5-LOXIN ADVANCED) TABS Take by mouth daily      multivitamin-iron-minerals-folic acid (CENTRUM) chewable tablet Chew 1 tablet daily      tamsulosin (FLOMAX) 0 4 mg TAKE 1 CAPSULE BY MOUTH  DAILY AT BEDTIME 90 capsule 3    TURMERIC PO Take by mouth      Vascepa 1 g CAPS TAKE 2 CAPSULES BY MOUTH  TWICE DAILY 360 capsule 3    calcium citrate-vitamin D (CITRACAL+D) 315-200 MG-UNIT per tablet Take 1 tablet by mouth 2 (two) times a day (Patient not taking: Reported on 11/16/2021 )       No current facility-administered medications for this visit  Objective:    /70 (BP Location: Left arm, Patient Position: Sitting, Cuff Size: Adult)   Pulse 78   Temp 98 2 °F (36 8 °C) (Temporal)   Resp 14   Ht 5' 5" (1 651 m)   Wt 100 kg (221 lb)   BMI 36 78 kg/m²        Physical Exam  Constitutional:       General: He is not in acute distress  Appearance: He is obese  He is not ill-appearing  HENT:      Nose: Congestion present  No rhinorrhea  Right Sinus: No maxillary sinus tenderness or frontal sinus tenderness  Left Sinus: No maxillary sinus tenderness or frontal sinus tenderness  Mouth/Throat:      Pharynx: No oropharyngeal exudate or posterior oropharyngeal erythema     Pulmonary:      Effort: Pulmonary effort is normal  No respiratory distress  Breath sounds: No wheezing, rhonchi or rales  Neurological:      Mental Status: He is alert  Assessment/Plan:         Diagnoses and all orders for this visit:    Cough  -     azithromycin (ZITHROMAX) 250 mg tablet;  Take 2 tablets today then 1 tablet daily x 4 days  Mucinex 600 mg BID OTC       rto prn                   Niko Hayden MD

## 2022-08-26 DIAGNOSIS — I25.10 CORONARY ARTERY DISEASE INVOLVING NATIVE CORONARY ARTERY OF NATIVE HEART WITHOUT ANGINA PECTORIS: ICD-10-CM

## 2022-08-26 RX ORDER — METOPROLOL SUCCINATE 25 MG/1
TABLET, EXTENDED RELEASE ORAL
Qty: 90 TABLET | Refills: 3 | Status: SHIPPED | OUTPATIENT
Start: 2022-08-26

## 2022-09-12 ENCOUNTER — OFFICE VISIT (OUTPATIENT)
Dept: OBGYN CLINIC | Facility: CLINIC | Age: 72
End: 2022-09-12
Payer: COMMERCIAL

## 2022-09-12 VITALS
DIASTOLIC BLOOD PRESSURE: 78 MMHG | SYSTOLIC BLOOD PRESSURE: 132 MMHG | WEIGHT: 221 LBS | BODY MASS INDEX: 36.78 KG/M2 | HEART RATE: 60 BPM

## 2022-09-12 DIAGNOSIS — M65.332 TRIGGER MIDDLE FINGER OF LEFT HAND: Primary | ICD-10-CM

## 2022-09-12 DIAGNOSIS — Z01.812 ENCOUNTER FOR PRE-OPERATIVE LABORATORY TESTING: ICD-10-CM

## 2022-09-12 PROCEDURE — 99214 OFFICE O/P EST MOD 30 MIN: CPT | Performed by: ORTHOPAEDIC SURGERY

## 2022-09-12 PROCEDURE — 1160F RVW MEDS BY RX/DR IN RCRD: CPT | Performed by: ORTHOPAEDIC SURGERY

## 2022-09-12 NOTE — PROGRESS NOTES
Assessment/Plan:  1  Trigger long finger of left hand  Case request operating room: RELEASE TRIGGER FINGER LONG FINGER    Ambulatory referral to Cardiology    Protime-INR    APTT    CBC and differential    Basic metabolic panel    EKG 12 lead    Case request operating room: RELEASE TRIGGER FINGER LONG FINGER   2  Encounter for pre-operative laboratory testing  Ambulatory referral to Cardiology    Protime-INR    APTT    CBC and differential    Basic metabolic panel    EKG 12 lead     Scribe Attestation    I,:  Harper Whitt am acting as a scribe while in the presence of the attending physician :       I,:  Abdiel Cadena MD personally performed the services described in this documentation    as scribed in my presence :           Kt Orlando upon exam today demonstrates active triggering of the left long finger  He is tender at the long finger A1 pulley  Since he has already received 2 injections, I believe he is a candidate for surgery in the form of left long finger trigger finger release  Risks of the surgery are inclusive of but not limited to bleeding, infection, nerve injury, blood clot, worsening of symptoms, not achieving the anticipated results, persistent stiffness, persistence or recurrence of triggering, weakness and the need for additional surgery  The patient verbally stated they understood those risks and would like to proceed with the surgery  He will meet with my surgery scheduler today to determine a surgery date  I will see him back on the date of surgery  He would like to have sedation and thus will require preoperative medical clearance and laboratory studies and EKG were also ordered today  Subjective:   Klaudia Joya is a 73 y o  male who presents follow up evaluation of his left long finger  He has been treated for trigger finger of the long finger as left hand and has already received 2 injections within the last year  He says his hand feels stiff and swollen regularly   He says he has been taking Toprol recently for his symptoms  Review of Systems   Constitutional: Negative for chills, fever and unexpected weight change  HENT: Negative for nosebleeds and sore throat  Eyes: Negative for pain, redness and visual disturbance  Respiratory: Negative for cough, shortness of breath and wheezing  Cardiovascular: Negative for chest pain, palpitations and leg swelling  Gastrointestinal: Negative for nausea and vomiting  Endocrine: Negative for polydipsia and polyuria  Genitourinary: Negative for dysuria and hematuria  Musculoskeletal:        As noted in HPI   Skin: Negative for rash and wound  Neurological: Negative for dizziness, numbness and headaches  Psychiatric/Behavioral: Negative for decreased concentration  The patient is not nervous/anxious            Past Medical History:   Diagnosis Date    Abnormal electrocardiogram     last assessed 12/19/13    Arthritis     hand and forearm    Chest pain     Coronary artery disease     Enthesopathy of elbow 02/15/2006    Epididymo-orchitis 07/10/2012    Hyperlipidemia     Hypertension     Obesity     Voice disturbance 05/27/2008       Past Surgical History:   Procedure Laterality Date    ANGIOPLASTY  2013    one stent RCA    APPENDECTOMY      CARPAL TUNNEL RELEASE Left 2018    CORONARY ANGIOPLASTY      CORONARY ANGIOPLASTY WITH STENT PLACEMENT      CORONARY ANGIOPLASTY WITH STENT PLACEMENT  2013    VT REVISE MEDIAN N/CARPAL TUNNEL SURG Left 8/13/2018    Procedure: RELEASE CARPAL TUNNEL;  Surgeon: Clare Jackson MD;  Location: [address] [place] [address];  Service: Orthopedics    VT REVISE MEDIAN N/CARPAL TUNNEL SURG Right 9/19/2019    Procedure: RELEASE CARPAL TUNNEL;  Surgeon: Clare Jackson MD;  Location: [facility];  Service: Orthopedics       Family History   Problem Relation Age of Onset    Heart disease Mother     Hypertension Mother     Heart disease Father         CHF    Arthritis Brother     Obesity Brother     Hypertension Brother     No Known Problems Maternal Aunt     No Known Problems Maternal Uncle     No Known Problems Paternal Aunt     No Known Problems Paternal Uncle     No Known Problems Maternal Grandmother     No Known Problems Maternal Grandfather     No Known Problems Paternal Grandmother     No Known Problems Paternal Grandfather        Social History     Occupational History    Not on file   Tobacco Use    Smoking status: Never Smoker    Smokeless tobacco: Never Used   Vaping Use    Vaping Use: Never used   Substance and Sexual Activity    Alcohol use: No    Drug use: No    Sexual activity: Not on file         Current Outpatient Medications:     aspirin (ASPIRIN LOW DOSE) 81 MG tablet, Take 1 tablet by mouth daily Last dose is 8/7/18 , Disp: , Rfl:     atorvastatin (LIPITOR) 40 mg tablet, TAKE 1 TABLET BY MOUTH  DAILY AT BEDTIME, Disp: 90 tablet, Rfl: 3    Biotin 08861 MCG TABS, Take 5,000 mg by mouth daily, Disp: , Rfl:     calcium citrate (CALCITRATE) 950 MG tablet, Take 1 tablet by mouth daily, Disp: , Rfl:     calcium citrate-vitamin D (CITRACAL+D) 315-200 MG-UNIT per tablet, Take 1 tablet by mouth 2 (two) times a day (Patient not taking: Reported on 11/16/2021 ), Disp: , Rfl:     Cholecalciferol (VITAMIN D3) 2000 units capsule, Take 2,000 Units by mouth daily  , Disp: , Rfl:     Coenzyme Q10 (CO Q-10) 400 MG CAPS, Take 1 capsule by mouth Daily, Disp: , Rfl:     cycloSPORINE (RESTASIS) 0 05 % ophthalmic emulsion, Administer to both eyes every 12 (twelve) hours  , Disp: , Rfl:     ezetimibe (ZETIA) 10 mg tablet, TAKE 1 TABLET BY MOUTH IN  THE MORNING, Disp: 90 tablet, Rfl: 3    lisinopril (ZESTRIL) 20 mg tablet, TAKE 1 TABLET BY MOUTH  DAILY, Disp: 90 tablet, Rfl: 3    metoprolol succinate (TOPROL-XL) 25 mg 24 hr tablet, TAKE 1 TABLET BY MOUTH  DAILY, Disp: 90 tablet, Rfl: 3    Misc Natural Products (OSTEO BI-FLEX/5-LOXIN ADVANCED) TABS, Take by mouth daily, Disp: , Rfl:     multivitamin-iron-minerals-folic acid (CENTRUM) chewable tablet, Chew 1 tablet daily, Disp: , Rfl:     tamsulosin (FLOMAX) 0 4 mg, TAKE 1 CAPSULE BY MOUTH  DAILY AT BEDTIME, Disp: 90 capsule, Rfl: 3    TURMERIC PO, Take by mouth, Disp: , Rfl:     Vascepa 1 g CAPS, TAKE 2 CAPSULES BY MOUTH  TWICE DAILY, Disp: 360 capsule, Rfl: 3    No Known Allergies    Objective:  Vitals:    09/12/22 1200   BP: 132/78   Pulse: 60       Left Hand Exam     Tenderness   Left hand tenderness location: A1 pulley and PIP joint of the long finger  Range of Motion   Hand   MP Middle: 90   PIP Middle: 90   DIP Middle: 79     Other   Sensation: normal  Pulse: present    Comments:  Full extension of the long finger actively  Active triggering noted on the exam today for the long finger            Physical Exam  HENT:      Head: Normocephalic and atraumatic  Eyes:      General:         Right eye: No discharge  Left eye: No discharge  Conjunctiva/sclera: Conjunctivae normal       Pupils: Pupils are equal, round, and reactive to light  Cardiovascular:      Rate and Rhythm: Normal rate  Heart sounds: Normal heart sounds  Pulmonary:      Effort: Pulmonary effort is normal  No respiratory distress  Breath sounds: Normal breath sounds  Musculoskeletal:         General: Tenderness present  Cervical back: Normal range of motion and neck supple  Comments: As noted in HPI   Skin:     General: Skin is warm and dry  Neurological:      Mental Status: He is alert and oriented to person, place, and time  I have personally reviewed pertinent films in PACS and my interpretation is as follows: No imaging to review  This document was created using speech voice recognition software  Grammatical errors, random word insertions, pronoun errors, and incomplete sentences are an occasional consequence of this system due to software limitations, ambient noise, and hardware issues     Any formal questions or concerns about content, text, or information contained within the body of this dictation should be directly addressed to the provider for clarification

## 2022-09-27 ENCOUNTER — APPOINTMENT (OUTPATIENT)
Dept: LAB | Facility: CLINIC | Age: 72
End: 2022-09-27
Payer: COMMERCIAL

## 2022-09-27 DIAGNOSIS — M65.332 TRIGGER MIDDLE FINGER OF LEFT HAND: ICD-10-CM

## 2022-09-27 DIAGNOSIS — Z01.812 ENCOUNTER FOR PRE-OPERATIVE LABORATORY TESTING: ICD-10-CM

## 2022-09-27 LAB
ANION GAP SERPL CALCULATED.3IONS-SCNC: 3 MMOL/L (ref 4–13)
APTT PPP: 30 SECONDS (ref 23–37)
BASOPHILS # BLD AUTO: 0.05 THOUSANDS/ΜL (ref 0–0.1)
BASOPHILS NFR BLD AUTO: 1 % (ref 0–1)
BUN SERPL-MCNC: 12 MG/DL (ref 5–25)
CALCIUM SERPL-MCNC: 9.4 MG/DL (ref 8.3–10.1)
CHLORIDE SERPL-SCNC: 107 MMOL/L (ref 96–108)
CO2 SERPL-SCNC: 29 MMOL/L (ref 21–32)
CREAT SERPL-MCNC: 0.88 MG/DL (ref 0.6–1.3)
EOSINOPHIL # BLD AUTO: 0.21 THOUSAND/ΜL (ref 0–0.61)
EOSINOPHIL NFR BLD AUTO: 3 % (ref 0–6)
ERYTHROCYTE [DISTWIDTH] IN BLOOD BY AUTOMATED COUNT: 12.4 % (ref 11.6–15.1)
GFR SERPL CREATININE-BSD FRML MDRD: 87 ML/MIN/1.73SQ M
GLUCOSE P FAST SERPL-MCNC: 106 MG/DL (ref 65–99)
HCT VFR BLD AUTO: 43.5 % (ref 36.5–49.3)
HGB BLD-MCNC: 14.2 G/DL (ref 12–17)
IMM GRANULOCYTES # BLD AUTO: 0.02 THOUSAND/UL (ref 0–0.2)
IMM GRANULOCYTES NFR BLD AUTO: 0 % (ref 0–2)
INR PPP: 0.95 (ref 0.84–1.19)
LYMPHOCYTES # BLD AUTO: 2.46 THOUSANDS/ΜL (ref 0.6–4.47)
LYMPHOCYTES NFR BLD AUTO: 31 % (ref 14–44)
MCH RBC QN AUTO: 32.7 PG (ref 26.8–34.3)
MCHC RBC AUTO-ENTMCNC: 32.6 G/DL (ref 31.4–37.4)
MCV RBC AUTO: 100 FL (ref 82–98)
MONOCYTES # BLD AUTO: 0.64 THOUSAND/ΜL (ref 0.17–1.22)
MONOCYTES NFR BLD AUTO: 8 % (ref 4–12)
NEUTROPHILS # BLD AUTO: 4.58 THOUSANDS/ΜL (ref 1.85–7.62)
NEUTS SEG NFR BLD AUTO: 57 % (ref 43–75)
NRBC BLD AUTO-RTO: 0 /100 WBCS
PLATELET # BLD AUTO: 179 THOUSANDS/UL (ref 149–390)
PMV BLD AUTO: 10.3 FL (ref 8.9–12.7)
POTASSIUM SERPL-SCNC: 4.7 MMOL/L (ref 3.5–5.3)
PROTHROMBIN TIME: 12.9 SECONDS (ref 11.6–14.5)
RBC # BLD AUTO: 4.34 MILLION/UL (ref 3.88–5.62)
SODIUM SERPL-SCNC: 139 MMOL/L (ref 135–147)
WBC # BLD AUTO: 7.96 THOUSAND/UL (ref 4.31–10.16)

## 2022-09-27 PROCEDURE — 80048 BASIC METABOLIC PNL TOTAL CA: CPT

## 2022-09-27 PROCEDURE — 85730 THROMBOPLASTIN TIME PARTIAL: CPT

## 2022-09-27 PROCEDURE — 85025 COMPLETE CBC W/AUTO DIFF WBC: CPT

## 2022-09-27 PROCEDURE — 36415 COLL VENOUS BLD VENIPUNCTURE: CPT

## 2022-09-27 PROCEDURE — 85610 PROTHROMBIN TIME: CPT

## 2022-10-03 ENCOUNTER — OFFICE VISIT (OUTPATIENT)
Dept: CARDIOLOGY CLINIC | Facility: CLINIC | Age: 72
End: 2022-10-03
Payer: COMMERCIAL

## 2022-10-03 VITALS
DIASTOLIC BLOOD PRESSURE: 70 MMHG | HEART RATE: 70 BPM | TEMPERATURE: 97.8 F | SYSTOLIC BLOOD PRESSURE: 136 MMHG | HEIGHT: 65 IN | WEIGHT: 213 LBS | BODY MASS INDEX: 35.49 KG/M2 | OXYGEN SATURATION: 98 %

## 2022-10-03 DIAGNOSIS — M65.332 TRIGGER MIDDLE FINGER OF LEFT HAND: ICD-10-CM

## 2022-10-03 DIAGNOSIS — E78.5 DYSLIPIDEMIA: ICD-10-CM

## 2022-10-03 DIAGNOSIS — I10 ESSENTIAL HYPERTENSION: ICD-10-CM

## 2022-10-03 DIAGNOSIS — E66.01 OBESITY, MORBID (HCC): ICD-10-CM

## 2022-10-03 DIAGNOSIS — Z01.810 PREOPERATIVE CARDIOVASCULAR EXAMINATION: Primary | ICD-10-CM

## 2022-10-03 DIAGNOSIS — I25.10 CAD IN NATIVE ARTERY: ICD-10-CM

## 2022-10-03 PROCEDURE — 93000 ELECTROCARDIOGRAM COMPLETE: CPT | Performed by: INTERNAL MEDICINE

## 2022-10-03 PROCEDURE — 99214 OFFICE O/P EST MOD 30 MIN: CPT | Performed by: INTERNAL MEDICINE

## 2022-10-03 NOTE — PROGRESS NOTES
Cardiology Follow Up    Stephenie Miramontes  1950  [id number]      Interval History: Stephenie Miramontes is here for follow up of CAD and for preoperative risk prior to undergoing trigger finger release  Surgery is scheduled for October 20, 2022 with Dr Brad Chan  He has no prior complications with anesthesia  Since his last visit, he has been feeling well without any chest pain, shortness of breath, lower extremity edema, orthopnea or paroxysmal nocturnal dyspnea  He does not exercise regularly but is active and does not have any symptoms with exertion  No recent cardiac testing  Last testing was done in 2019 which was normal       He had blood work done in August which showed LDL cholesterol of 68  Triglycerides of 109 and A1c of 6 2%  He had recent BMP and CMP which were normal   reports good adherence with medications  Current medication regimen includes Toprol XL 25 mg daily, lisinopril 20 mg daily, atorvastatin 40 mg, Zetia 10 mg and Vascepa 2 g twice a day  He has a history of RCA stent in 2014  Prior to his PCI he had a burning sensation in his chest  He previously was on atorvastatin 80 mg daily which was reduced to 40 mg daily due to myalgias and elevated creatinine kinase  The following portions of the patient's history were reviewed and updated as appropriate:   He  has a past medical history of Abnormal electrocardiogram, Arthritis, Chest pain, Coronary artery disease, Enthesopathy of elbow (02/15/2006), Epididymo-orchitis (07/10/2012), Hyperlipidemia, Hypertension, Obesity, and Voice disturbance (05/27/2008)  He  has a past surgical history that includes Coronary angioplasty with stent; Appendectomy; Coronary angioplasty; Coronary angioplasty with stent (2013); pr revise median n/carpal tunnel surg (Left, 8/13/2018); Angioplasty (2013);  Carpal tunnel release (Left, 2018); and pr revise median n/carpal tunnel surg (Right, 9/19/2019)  His family history includes Arthritis in his brother; Heart disease in his father and mother; Hypertension in his brother and mother; No Known Problems in his maternal aunt, maternal grandfather, maternal grandmother, maternal uncle, paternal aunt, paternal grandfather, paternal grandmother, and paternal uncle; Obesity in his brother  He  reports that he has never smoked  He has never used smokeless tobacco  He reports that he does not drink alcohol and does not use drugs    Current Outpatient Medications   Medication Sig Dispense Refill    aspirin 81 MG tablet Take 1 tablet by mouth daily Last dose is 8/7/18       atorvastatin (LIPITOR) 40 mg tablet TAKE 1 TABLET BY MOUTH  DAILY AT BEDTIME 90 tablet 3    Biotin 56070 MCG TABS Take 5,000 mg by mouth daily      calcium citrate (CALCITRATE) 950 MG tablet Take 1 tablet by mouth daily      Cholecalciferol (VITAMIN D3) 2000 units capsule Take 2,000 Units by mouth daily        Coenzyme Q10 (CO Q-10) 400 MG CAPS Take 1 capsule by mouth Daily      CRANBERRY JUICE EXTRACT PO Take by mouth      cycloSPORINE (RESTASIS) 0 05 % ophthalmic emulsion Administer to both eyes every 12 (twelve) hours        ezetimibe (ZETIA) 10 mg tablet TAKE 1 TABLET BY MOUTH IN  THE MORNING 90 tablet 3    lisinopril (ZESTRIL) 20 mg tablet TAKE 1 TABLET BY MOUTH  DAILY 90 tablet 3    metoprolol succinate (TOPROL-XL) 25 mg 24 hr tablet TAKE 1 TABLET BY MOUTH  DAILY 90 tablet 3    Misc Natural Products (OSTEO BI-FLEX/5-LOXIN ADVANCED) TABS Take by mouth daily      multivitamin-iron-minerals-folic acid (CENTRUM) chewable tablet Chew 1 tablet daily      tamsulosin (FLOMAX) 0 4 mg TAKE 1 CAPSULE BY MOUTH  DAILY AT BEDTIME 90 capsule 3    TURMERIC PO Take by mouth      Vascepa 1 g CAPS TAKE 2 CAPSULES BY MOUTH  TWICE DAILY 360 capsule 3    calcium citrate-vitamin D (CITRACAL+D) 315-200 MG-UNIT per tablet Take 1 tablet by mouth 2 (two) times a day (Patient not taking: No sig reported)       No current facility-administered medications for this visit  He has No Known Allergies         Review of Systems:  Review of Systems   Respiratory: Negative for shortness of breath  Cardiovascular: Negative for chest pain, palpitations and leg swelling  Musculoskeletal: Positive for arthralgias  All other systems reviewed and are negative  Physical Exam:  /70 (BP Location: Right arm, Patient Position: Sitting, Cuff Size: Large)   Pulse 70   Temp 97 8 °F (36 6 °C)   Ht 5' 5" (1 651 m)   Wt 96 6 kg (213 lb)   SpO2 98%   BMI 35 45 kg/m²     Physical Exam  Constitutional:       General: He is not in acute distress  Appearance: He is well-developed  He is not diaphoretic  HENT:      Head: Normocephalic and atraumatic  Eyes:      Conjunctiva/sclera: Conjunctivae normal       Pupils: Pupils are equal, round, and reactive to light  Neck:      Thyroid: No thyromegaly  Vascular: No JVD  Cardiovascular:      Rate and Rhythm: Normal rate and regular rhythm  Heart sounds: Normal heart sounds  No murmur heard  No friction rub  No gallop  Pulmonary:      Effort: Pulmonary effort is normal       Breath sounds: Normal breath sounds  Musculoskeletal:      Cervical back: Neck supple  Skin:     General: Skin is warm and dry  Findings: No erythema or rash  Neurological:      General: No focal deficit present  Mental Status: He is alert and oriented to person, place, and time  Cranial Nerves: No cranial nerve deficit  Psychiatric:         Mood and Affect: Mood normal          Behavior: Behavior normal          Thought Content:  Thought content normal          Judgment: Judgment normal          Cardiographics  ECG:  Normal ECG    Labs:  Lab Results   Component Value Date     05/12/2017    K 4 7 09/27/2022    K 5 0 04/25/2022     09/27/2022     04/25/2022    CO2 29 09/27/2022    CO2 21 04/25/2022    BUN 12 09/27/2022    BUN 15 04/25/2022    CREATININE 0 88 09/27/2022    CREATININE 0 82 05/12/2017    GLUCOSE 94 05/12/2017    CALCIUM 9 4 09/27/2022    CALCIUM 8 8 05/12/2017     Lab Results   Component Value Date    WBC 7 96 09/27/2022    HGB 14 2 09/27/2022    HCT 43 5 09/27/2022     (H) 09/27/2022     09/27/2022     Lab Results   Component Value Date    CHOL 112 05/12/2017    TRIG 93 04/25/2022    HDL 37 (L) 04/25/2022     Imaging: No results found  Discussion/Summary:  1  Coronary artery disease involving native coronary artery of native heart without angina pectoris  -   Patient is free of angina  Continue Toprol XL 25 mg daily  -   Continue aspirin 81 mg daily for secondary prevention   -   Discussed diet and exercise  Encouraged to lose 15-20 lb  2  Essential hypertension  -   Blood pressure target is less than 130/80  Continue current medication regimen   - POCT ECG    3  Dyslipidemia  -   LDL target is less than 70 mg/dL  Triglycerides less than 150  Currently at goal   Continue current regimen   - lipid panel and A1c ordered  - POCT ECG  - Continue statin and Zetia    4  Preoperative risk stratification prior to undergoing trigger finger release  - patient is at low risk for procedure and is currently medically stable  May proceed as planned

## 2022-10-14 NOTE — PRE-PROCEDURE INSTRUCTIONS
My Surgical Experience    The following information was developed to assist you to prepare for your operation  What do I need to do before coming to the hospital?  • Arrange for a responsible person to drive you to and from the hospital   • Arrange care for your children at home  Children are not allowed in the recovery areas of the hospital  • Plan to wear clothing that is easy to put on and take off  If you are having shoulder surgery, wear a shirt that buttons or zippers in the front  Bathing  o Shower the evening before and the morning of your surgery with an antibacterial soap  Please refer to the Pre Op Showering Instructions for Surgery Patients Sheet   o Remove nail polish and all body piercing jewelry  o Do not shave any body part for at least 24 hours before surgery-this includes face, arms, legs and upper body  Food  o Nothing to eat or drink after midnight the night before your surgery  This includes candy and chewing gum  o Exception: If your surgery is after 12:00pm (noon), you may have clear liquids such as 7-Up®, ginger ale, apple or cranberry juice, Jell-O®, water, or clear broth until 8:00 am  o Do not drink milk or juice with pulp on the morning before surgery  o Do not drink alcohol 24 hours before surgery  Medicine  o Follow instructions you received from your surgeon about which medicines you may take on the day of surgery  o If instructed to take medicine on the morning of surgery, take pills with just a small sip of water  Call your prescribing doctor for specific infroamtion on what to do if you take insulin    What should I bring to the hospital?    Bring:  • Crutches or a walker, if you have them, for foot or knee surgery  • A list of the daily medicines, vitamins, minerals, herbals and nutritional supplements you take   Include the dosages of medicines and the time you take them each day  • Glasses, dentures or hearing aids  • Minimal clothing; you will be wearing hospital sleepwear  • Photo ID; required to verify your identity  • If you have a Living Will or Power of , bring a copy of the documents  • If you have an ostomy, bring an extra pouch and any supplies you use    Do not bring  • Medicines or inhalers  • Money, valuables or jewelry    What other information should I know about the day of surgery? • Notify your surgeons if you develop a cold, sore throat, cough, fever, rash or any other illness  • Report to the Ambulatory Surgical/Same Day Surgery Unit  • You will be instructed to stop at Registration only if you have not been pre-registered  • Inform your  fi they do not stay that they will be asked by the staff to leave a phone number where they can be reached  • Be available to be reached before surgery  In the event the operating room schedule changes, you may be asked to come in earlier or later than expected    *It is important to tell your doctor and others involved in your health care if you are taking or have been taking any non-prescription drugs, vitamins, minerals, herbals or other nutritional supplements  Any of these may interact with some food or medicines and cause a reaction      Pre-Surgery Instructions:   Medication Instructions   • aspirin 81 MG tablet Stop taking 5 days prior to surgery  • atorvastatin (LIPITOR) 40 mg tablet Take night before surgery   • Biotin 91665 MCG TABS Hold day of surgery  • calcium citrate (CALCITRATE) 950 MG tablet Hold day of surgery  • Cholecalciferol (VITAMIN D3) 2000 units capsule Hold day of surgery  • Coenzyme Q10 (CO Q-10) 400 MG CAPS Hold day of surgery  • CRANBERRY JUICE EXTRACT PO Hold day of surgery  • cycloSPORINE (RESTASIS) 0 05 % ophthalmic emulsion Take day of surgery  • ezetimibe (ZETIA) 10 mg tablet Take night before surgery   • lisinopril (ZESTRIL) 20 mg tablet Hold day of surgery  • metoprolol succinate (TOPROL-XL) 25 mg 24 hr tablet Take day of surgery     • Misc Natural Products (OSTEO BI-FLEX/5-LOXIN ADVANCED) TABS Hold day of surgery  • multivitamin-iron-minerals-folic acid (CENTRUM) chewable tablet Hold day of surgery  • tamsulosin (FLOMAX) 0 4 mg Take night before surgery   • TURMERIC PO Stop taking 7 days prior to surgery  • Vascepa 1 g CAPS Stop taking 7 days prior to surgery

## 2022-10-19 ENCOUNTER — ANESTHESIA EVENT (OUTPATIENT)
Dept: PERIOP | Facility: HOSPITAL | Age: 72
End: 2022-10-19
Payer: COMMERCIAL

## 2022-10-19 NOTE — ANESTHESIA PREPROCEDURE EVALUATION
Procedure:  RELEASE TRIGGER FINGER LONG FINGER (Left Hand)    Relevant Problems   CARDIO   (+) CAD in native artery   (+) Essential hypertension        Physical Exam    Airway    Mallampati score: II         Dental   No notable dental hx upper dentures,     Cardiovascular      Pulmonary      Other Findings        Anesthesia Plan  ASA Score- 3     Anesthesia Type- IV sedation with anesthesia with ASA Monitors  Additional Monitors:   Airway Plan:           Plan Factors-    Chart reviewed  Existing labs reviewed  Patient summary reviewed  Patient is not a current smoker  Induction- intravenous  Postoperative Plan-     Informed Consent- Anesthetic plan and risks discussed with patient and spouse  I personally reviewed this patient with the CRNA  Discussed and agreed on the Anesthesia Plan with the CRNA  Hai Stokes

## 2022-10-20 ENCOUNTER — ANESTHESIA (OUTPATIENT)
Dept: PERIOP | Facility: HOSPITAL | Age: 72
End: 2022-10-20
Payer: COMMERCIAL

## 2022-10-20 ENCOUNTER — HOSPITAL ENCOUNTER (OUTPATIENT)
Facility: HOSPITAL | Age: 72
Setting detail: OUTPATIENT SURGERY
Discharge: HOME | End: 2022-10-20
Attending: ORTHOPAEDIC SURGERY | Admitting: ORTHOPAEDIC SURGERY
Payer: COMMERCIAL

## 2022-10-20 VITALS
SYSTOLIC BLOOD PRESSURE: 181 MMHG | HEART RATE: 54 BPM | OXYGEN SATURATION: 98 % | BODY MASS INDEX: 35.35 KG/M2 | WEIGHT: 212.4 LBS | TEMPERATURE: 98.6 F | DIASTOLIC BLOOD PRESSURE: 81 MMHG | RESPIRATION RATE: 16 BRPM

## 2022-10-20 PROCEDURE — 26055 INCISE FINGER TENDON SHEATH: CPT | Performed by: ORTHOPAEDIC SURGERY

## 2022-10-20 PROCEDURE — 99024 POSTOP FOLLOW-UP VISIT: CPT | Performed by: ORTHOPAEDIC SURGERY

## 2022-10-20 RX ORDER — LIDOCAINE HYDROCHLORIDE 10 MG/ML
INJECTION, SOLUTION EPIDURAL; INFILTRATION; INTRACAUDAL; PERINEURAL AS NEEDED
Status: DISCONTINUED | OUTPATIENT
Start: 2022-10-20 | End: 2022-10-20

## 2022-10-20 RX ORDER — MIDAZOLAM HYDROCHLORIDE 2 MG/2ML
INJECTION, SOLUTION INTRAMUSCULAR; INTRAVENOUS AS NEEDED
Status: DISCONTINUED | OUTPATIENT
Start: 2022-10-20 | End: 2022-10-20

## 2022-10-20 RX ORDER — CEFAZOLIN SODIUM 2 G/50ML
SOLUTION INTRAVENOUS AS NEEDED
Status: DISCONTINUED | OUTPATIENT
Start: 2022-10-20 | End: 2022-10-20

## 2022-10-20 RX ORDER — SODIUM CHLORIDE, SODIUM LACTATE, POTASSIUM CHLORIDE, CALCIUM CHLORIDE 600; 310; 30; 20 MG/100ML; MG/100ML; MG/100ML; MG/100ML
INJECTION, SOLUTION INTRAVENOUS CONTINUOUS PRN
Status: DISCONTINUED | OUTPATIENT
Start: 2022-10-20 | End: 2022-10-20

## 2022-10-20 RX ORDER — PROPOFOL 10 MG/ML
INJECTION, EMULSION INTRAVENOUS AS NEEDED
Status: DISCONTINUED | OUTPATIENT
Start: 2022-10-20 | End: 2022-10-20

## 2022-10-20 RX ORDER — FENTANYL CITRATE 50 UG/ML
INJECTION, SOLUTION INTRAMUSCULAR; INTRAVENOUS AS NEEDED
Status: DISCONTINUED | OUTPATIENT
Start: 2022-10-20 | End: 2022-10-20

## 2022-10-20 RX ORDER — SODIUM CHLORIDE, SODIUM LACTATE, POTASSIUM CHLORIDE, CALCIUM CHLORIDE 600; 310; 30; 20 MG/100ML; MG/100ML; MG/100ML; MG/100ML
125 INJECTION, SOLUTION INTRAVENOUS CONTINUOUS
Status: DISCONTINUED | OUTPATIENT
Start: 2022-10-20 | End: 2022-10-20 | Stop reason: HOSPADM

## 2022-10-20 RX ORDER — ONDANSETRON 2 MG/ML
4 INJECTION INTRAMUSCULAR; INTRAVENOUS ONCE AS NEEDED
Status: DISCONTINUED | OUTPATIENT
Start: 2022-10-20 | End: 2022-10-20 | Stop reason: HOSPADM

## 2022-10-20 RX ADMIN — CEFAZOLIN SODIUM 2000 MG: 2 SOLUTION INTRAVENOUS at 07:56

## 2022-10-20 RX ADMIN — PROPOFOL 40 MG: 10 INJECTION, EMULSION INTRAVENOUS at 08:07

## 2022-10-20 RX ADMIN — FENTANYL CITRATE 50 MCG: 50 INJECTION, SOLUTION INTRAMUSCULAR; INTRAVENOUS at 08:11

## 2022-10-20 RX ADMIN — MIDAZOLAM 2 MG: 1 INJECTION INTRAMUSCULAR; INTRAVENOUS at 07:58

## 2022-10-20 RX ADMIN — LIDOCAINE HYDROCHLORIDE 50 MG: 10 INJECTION, SOLUTION EPIDURAL; INFILTRATION; INTRACAUDAL; PERINEURAL at 08:07

## 2022-10-20 RX ADMIN — FENTANYL CITRATE 50 MCG: 50 INJECTION, SOLUTION INTRAMUSCULAR; INTRAVENOUS at 08:07

## 2022-10-20 RX ADMIN — SODIUM CHLORIDE, SODIUM LACTATE, POTASSIUM CHLORIDE, AND CALCIUM CHLORIDE: .6; .31; .03; .02 INJECTION, SOLUTION INTRAVENOUS at 07:59

## 2022-10-20 NOTE — DISCHARGE INSTRUCTIONS
INSTRUCTIONS FOLLOWING YOUR HAND/WRIST SURGERY     First follow-up visit after surgery   Call the office the day after your surgery & make an appointment for 10-14 days after surgery to see Dr Diana Hua  At that appointment, your sutures will be removed  Dressing & Wound Care   If you are in a splint, keep the original dressing on and dry until you are seen by Dr Diana Hua  You may take a bath by covering your arm in a garbage bag or other waterproof bag  Tie it securely with rubber bands and duct tape  Keep your dressing as clean as possible     If you are only in a soft dressing, you may take the dressing off at 3 days after your operation; at which point you may shower and get the incision wet  Do not soak the wound in dish, bath, or pool water until after the stitches have been removed     Swelling and Discoloration   You will notice some swelling of your hand - this is normal  Elevate your hand above the level of your heart as much as possible for the first week  When you sleep, place your hand on several pillows  You may notice a bluish discoloration of your fingers  This is also normal  This discoloration may change from blue to purple to green to yellow, then will slowly go away over a few weeks time  Discomfort   You will experience some pain and discomfort after surgery  By the third day, this pain usually decreases significantly  You will be given a prescription for pain medication  Take the medication as prescribed  If the discomfort is mild, you can take 1 or 2 Tylenol every 4 - 6 hours as needed, instead of the prescribed pain medication  Temperature   A temperature of up to 101  5ºF is common for the first 2 days after surgery  If your temperature is elevated above 101 5º F, call the office  Exercise   Unless instructed otherwise, you may gently open and close your fingers  Do not perform any exercises that cause you to sweat   Sweating may increase complications with your incision  We hope this answers many of your questions regarding your surgery  These are only guidelines however  If you have any other concerns or questions at any time, do not hesitate to call the office [phone]

## 2022-10-20 NOTE — PERIOPERATIVE NURSING NOTE
BP at this time= 172/75  Asymptomatic with elevated BP  Patient states he did not take his lisinopril this AM and will take it as soon as he gets home  Tolerating oral fluids well

## 2022-10-20 NOTE — OP NOTE
OPERATIVE REPORT  PATIENT NAME: Melchor Bedolla    :  1950  MRN: [id number]  Pt Location: [facility] OR ROOM 01    SURGERY DATE: 10/20/2022    Surgeon(s) and Role:     * Debi Butterfield MD - Primary  Jonelle LIU  And [facility] assistant    Preop Diagnosis:  Trigger long finger of left hand [M65 332]    Post-Op Diagnosis Codes:     * Trigger long finger of left hand [M65 332]    Procedure(s) (LRB):  RELEASE TRIGGER FINGER LONG FINGER (Left)    Specimen(s):  * No specimens in log *    Estimated Blood Loss:   Minimal    Drains:  * No LDAs found *    Anesthesia Type:   IV Sedation with local Anesthesia as a digital block by the attending surgeon after alcohol prep with mixture 1% lidocaine plain and 0 5% Marcaine plain    Operative Indications:  Trigger long finger of left hand Alexia Solorzano is a 42-year-old male who has been suffering with left long finger triggering  He had failed non operative measures and wished to undergo a left long finger trigger release  He understood the risks and benefits of this procedure wished to go ahead  The risks are inclusive of but not limited to infection, stiffness, nerve or blood vessel injury causing numbness pain and weakness, worsening of symptoms, failure to regain full strength and ability, failure to achieve anticipated results, persistence or recurrence of triggering, and need for further surgery  Operative Findings:  Left hand examined under loupe magnification demonstrated significant swelling of the flexor tendons passing through the A1 pulley region  We did demonstrate complete release of the A1 pulley as well as the proximal aspect of the A2 pulley at the end of the operation  Once Unknown Nicks was awake, I did ask him to flex and extend his hand and he was able to completely flex the long finger and completely extend the long finger without any signs of triggering  He was unable to do this preoperatively      Complications: None    Procedure and Technique:  Wanda Gilbert was taken to the operating room and was lying supine on the hospital stretcher  We took a surgical time-out and sedation was administered  We then prepped the left hand and administer digital block for the left long finger as described above  Left upper extremity was then prepped and draped in usual sterile fashion  Second surgical time-out was taken  We exsanguinated and inflated tourniquet  Total tourniquet time was 12 minutes  Under loupe magnification, a longitudinal incision was made over the A1 pulley with a 15 blade just through skin for the long finger  We carefully dissected past subcutaneous adipose and protected the neurovascular bundles on either side of the A1 pulley with retractors  We had excellent exposure of the A1 pulley and incised that longitudinally with a deep 15 blade  We did carry the release down to the proximal aspect of the A2 pulley as there was significant swelling of the flexor tendons  The tendons were intact however  We did examine them by pulling them out of the wound with use of a curved hemostat  We then flexed and extended the left long finger which gave very nice and full range of motion  We then irrigated and closed with interrupted 4-0 nylon sutures  Dry, sterile dressings were applied with an Ace bandage and a tourniquet was let down  Once Wanda Gilbert was awake, I did ask him to flex and extend the hand and he was able to flex and extend the long finger completely without any signs of triggering  He tolerated the procedure well and transferred to recovery room stable condition    He will follow up in 2 weeks for suture removal    I was present for the entire procedure and A qualified resident physician was not available    Patient Disposition:  PACU         SIGNATURE: Cornelio Roberts MD  DATE: October 20, 2022  TIME: 8:38 AM

## 2022-10-20 NOTE — PERIOPERATIVE NURSING NOTE
Received patient from PACU via stretcher awake and alert  Left hand dressing D+I  Fingers on left hand warm and mobile with good capillary refill  Denies c/o pain or discomfort

## 2022-10-20 NOTE — PERIOPERATIVE NURSING NOTE
AVS summary reviewed with patient and wife  Both verbalize understanding of all discharge instructions  Denies c/o pain or discomfort  Discharged via wheelchair

## 2022-10-20 NOTE — H&P
H&P Exam - Orthopedics   Leona Chan 73 y o  male MRN: [id number]  Unit/Bed#: OR [facility] Encounter: [id number]    Assessment/Plan     Assessment:  Left long finger trigger finger  Plan:  Left long finger trigger finger release    History of Present Illness   HPI:  Leona Chan is a 73 y o  male who presents with persistent trigger finger left long finger chronically      Review of Systems    Historical Information   Past Medical History:   Diagnosis Date   • Abnormal electrocardiogram     last assessed 12/19/13   • Arthritis     hand and forearm   • Chest pain    • Coronary artery disease    • Enthesopathy of elbow 02/15/2006   • Epididymo-orchitis 07/10/2012   • Hyperlipidemia    • Hypertension    • Obesity    • Voice disturbance 05/27/2008     Past Surgical History:   Procedure Laterality Date   • ANGIOPLASTY  2013    one stent RCA   • APPENDECTOMY     • CARPAL TUNNEL RELEASE Left 2018   • CORONARY ANGIOPLASTY     • CORONARY ANGIOPLASTY WITH STENT PLACEMENT     • CORONARY ANGIOPLASTY WITH STENT PLACEMENT  2013   • [place] REVISE MEDIAN N/CARPAL TUNNEL SURG Left 8/13/2018    Procedure: RELEASE CARPAL TUNNEL;  Surgeon: Gabrielle Hathaway MD;  [facility];  Service: Orthopedics   • [place] REVISE MEDIAN N/CARPAL TUNNEL SURG Right 9/19/2019    Procedure: RELEASE CARPAL TUNNEL;  Surgeon: Gabrielle Hathaway MD;  Location: [facility] OR;  Service: Orthopedics     Social History   Social History     Substance and Sexual Activity   Alcohol Use No     Social History     Substance and Sexual Activity   Drug Use No     Social History     Tobacco Use   Smoking Status Never Smoker   Smokeless Tobacco Never Used     Family History:   Family History   Problem Relation Age of Onset   • Heart disease Mother    • Hypertension Mother    • Heart disease Father         CHF   • Arthritis Brother    • Obesity Brother    • Hypertension Brother    • No Known Problems Maternal Aunt    • No Known Problems Maternal Uncle    • No Known Problems Paternal Aunt    • No Known Problems Paternal Uncle    • No Known Problems Maternal Grandmother    • No Known Problems Maternal Grandfather    • No Known Problems Paternal Grandmother    • No Known Problems Paternal Grandfather        Meds/Allergies   PTA meds:   Prior to Admission Medications   Prescriptions Last Dose Informant Patient Reported? Taking?    Biotin 84960 MCG TABS 10/19/2022 at 0900 Self Yes Yes   Sig: Take 5,000 mg by mouth daily   CRANBERRY JUICE EXTRACT PO 10/19/2022 at 1800  Yes Yes   Sig: Take by mouth   Cholecalciferol (VITAMIN D3) 2000 units capsule 10/19/2022 at 0900 Self Yes Yes   Sig: Take 2,000 Units by mouth daily     Coenzyme Q10 (CO Q-10) 400 MG CAPS 10/15/2022 at 1800 Self Yes Yes   Sig: Take 1 capsule by mouth Daily   Misc Natural Products (OSTEO BI-FLEX/5-LOXIN ADVANCED) TABS 10/19/2022 at 1200 Self Yes Yes   Sig: Take by mouth daily   TURMERIC PO 10/15/2022 at 1800 time Self Yes Yes   Sig: Take by mouth Last dose 1014/22   Vascepa 1 g CAPS 10/15/2022 at 1800  No Yes   Sig: TAKE 2 CAPSULES BY MOUTH  TWICE DAILY   Patient taking differently: Last dose 10/14/22   aspirin 81 MG tablet 10/15/2022 at 0800 time Self Yes Yes   Sig: Take 1 tablet by mouth daily Last dose is 10/14/22   atorvastatin (LIPITOR) 40 mg tablet 10/19/2022 at 2130  No Yes   Sig: TAKE 1 TABLET BY MOUTH  DAILY AT BEDTIME   calcium citrate-vitamin D (CITRACAL+D) 315-200 MG-UNIT per tablet  Self Yes No   Sig: Take 1 tablet by mouth 2 (two) times a day   Patient not taking: No sig reported   cycloSPORINE (RESTASIS) 0 05 % ophthalmic emulsion 10/20/2022 at 0530 Self Yes Yes   Sig: Administer to both eyes every 12 (twelve) hours     ezetimibe (ZETIA) 10 mg tablet 10/19/2022 at 2130  No Yes   Sig: TAKE 1 TABLET BY MOUTH IN  THE MORNING   lisinopril (ZESTRIL) 20 mg tablet 10/19/2022 at 2130  No Yes   Sig: TAKE 1 TABLET BY MOUTH  DAILY   metoprolol succinate (TOPROL-XL) 25 mg 24 hr tablet 10/20/2022 at 0530  No Yes   Sig: TAKE 1 TABLET BY MOUTH  DAILY   multivitamin-iron-minerals-folic acid (CENTRUM) chewable tablet 10/19/2022 at 0900 Self Yes Yes   Sig: Chew 1 tablet daily   tamsulosin (FLOMAX) 0 4 mg 10/19/2022 at 2130  No Yes   Sig: TAKE 1 CAPSULE BY MOUTH  DAILY AT BEDTIME      Facility-Administered Medications: None     No Known Allergies    Objective   Vitals: Blood pressure (!) 178/84, pulse 68, temperature 97 5 °F (36 4 °C), temperature source Temporal, resp  rate 18, weight 96 3 kg (212 lb 6 4 oz), SpO2 99 %  ,Body mass index is 35 35 kg/m²  No intake or output data in the 24 hours ending 10/20/22 0723    No intake/output data recorded  Invasive Devices  Report    Peripheral Intravenous Line  Duration           Peripheral IV 10/20/22 Distal;Right;Upper;Ventral (anterior) Arm <1 day                Physical Exam  Vitals and nursing note reviewed  Constitutional:       Appearance: He is well-developed  HENT:      Head: Normocephalic and atraumatic  Eyes:      Conjunctiva/sclera: Conjunctivae normal    Cardiovascular:      Rate and Rhythm: Normal rate  Heart sounds: No murmur heard  Pulmonary:      Effort: Pulmonary effort is normal  No respiratory distress  Skin:     General: Skin is warm and dry  Neurological:      Mental Status: He is alert         Left Hand Exam     Comments:  Triggering left long finger            Lab Results: CBC: No results found for: WBC, HGB, HCT, MCV, PLT, ADJUSTEDWBC, MCH, MCHC, RDW, MPV, NRBC

## 2022-10-20 NOTE — ANESTHESIA POSTPROCEDURE EVALUATION
Post-Op Assessment Note    CV Status:  Stable  Pain Score: 0    Pain management: adequate     Mental Status:  Alert   Hydration Status:  Stable   PONV Controlled:  None   Airway Patency:  Patent   Two or more mitigation strategies used for obstructive sleep apnea   Post Op Vitals Reviewed: Yes      Staff: CRNA         No complications documented      BP  171/81   Temp      Pulse 75   Resp 16   SpO2 99

## 2022-11-04 ENCOUNTER — OFFICE VISIT (OUTPATIENT)
Dept: OBGYN CLINIC | Facility: CLINIC | Age: 72
End: 2022-11-04

## 2022-11-04 VITALS — WEIGHT: 212 LBS | HEIGHT: 65 IN | BODY MASS INDEX: 35.32 KG/M2

## 2022-11-04 DIAGNOSIS — M65.332 TRIGGER MIDDLE FINGER OF LEFT HAND: Primary | ICD-10-CM

## 2022-11-04 NOTE — PROGRESS NOTES
Assessment/Plan:  1  Trigger long finger of left hand       The patient is doing well and has no triggering about his digit  He is no signs of infection today  He can do simple activities but should avoid heavy lifting for another 2 weeks  He should avoid submerging his hand in water for 1 more week as well  He will follow up as needed  Subjective:   Tacos Workman is a 73 y o  male who presents today for follow-up of his left hand, now about 2 weeks status post trigger finger release  He is doing well and notes some mild stiffness about the finger but denies any triggering    He notes good sensation into the hand      Review of Systems      Past Medical History:   Diagnosis Date   • Abnormal electrocardiogram     last assessed 12/19/13   • Arthritis     hand and forearm   • Chest pain    • Coronary artery disease    • Enthesopathy of elbow 02/15/2006   • Epididymo-orchitis 07/10/2012   • Hyperlipidemia    • Hypertension    • Obesity    • Voice disturbance 05/27/2008       Past Surgical History:   Procedure Laterality Date   • ANGIOPLASTY  2013    one stent RCA   • APPENDECTOMY     • CARPAL TUNNEL RELEASE Left 2018   • CORONARY ANGIOPLASTY     • CORONARY ANGIOPLASTY WITH STENT PLACEMENT     • CORONARY ANGIOPLASTY WITH STENT PLACEMENT  2013   • [place] INCISE FINGER TENDON SHEATH Left 10/20/2022    Procedure: RELEASE TRIGGER FINGER LONG FINGER;  Surgeon: Rosalind Elizabeth MD;  Location: [address] [place] [address];  Service: Orthopedics   • [place] REVISE MEDIAN N/CARPAL TUNNEL SURG Left 8/13/2018    Procedure: RELEASE CARPAL TUNNEL;  Surgeon: Pilar Mcfarland MD;  Location: [address] [place] [address];  Service: Orthopedics   • [place] REVISE MEDIAN N/CARPAL TUNNEL SURG Right 9/19/2019    Procedure: RELEASE CARPAL TUNNEL;  Surgeon: Pilar Mcfarland MD;  Location: [facility];  Service: Orthopedics       Family History   Problem Relation Age of Onset   • Heart disease Mother    • Hypertension Mother    • Heart disease Father         CHF   • Arthritis Brother    • Obesity Brother    • Hypertension Brother    • No Known Problems Maternal Aunt    • No Known Problems Maternal Uncle    • No Known Problems Paternal Aunt    • No Known Problems Paternal Uncle    • No Known Problems Maternal Grandmother    • No Known Problems Maternal Grandfather    • No Known Problems Paternal Grandmother    • No Known Problems Paternal Grandfather        Social History     Occupational History   • Not on file   Tobacco Use   • Smoking status: Never Smoker   • Smokeless tobacco: Never Used   Vaping Use   • Vaping Use: Never used   Substance and Sexual Activity   • Alcohol use: No   • Drug use: No   • Sexual activity: Not on file         Current Outpatient Medications:   •  aspirin 81 MG tablet, Take 1 tablet by mouth daily Last dose is 10/14/22, Disp: , Rfl:   •  atorvastatin (LIPITOR) 40 mg tablet, TAKE 1 TABLET BY MOUTH  DAILY AT BEDTIME, Disp: 90 tablet, Rfl: 3  •  Biotin 93668 MCG TABS, Take 5,000 mg by mouth daily, Disp: , Rfl:   •  calcium citrate-vitamin D (CITRACAL+D) 315-200 MG-UNIT per tablet, Take 1 tablet by mouth 2 (two) times a day (Patient not taking: No sig reported), Disp: , Rfl:   •  Cholecalciferol (VITAMIN D3) 2000 units capsule, Take 2,000 Units by mouth daily  , Disp: , Rfl:   •  Coenzyme Q10 (CO Q-10) 400 MG CAPS, Take 1 capsule by mouth Daily, Disp: , Rfl:   •  CRANBERRY JUICE EXTRACT PO, Take by mouth, Disp: , Rfl:   •  cycloSPORINE (RESTASIS) 0 05 % ophthalmic emulsion, Administer to both eyes every 12 (twelve) hours  , Disp: , Rfl:   •  ezetimibe (ZETIA) 10 mg tablet, TAKE 1 TABLET BY MOUTH IN  THE MORNING, Disp: 90 tablet, Rfl: 3  •  lisinopril (ZESTRIL) 20 mg tablet, TAKE 1 TABLET BY MOUTH  DAILY, Disp: 90 tablet, Rfl: 3  •  metoprolol succinate (TOPROL-XL) 25 mg 24 hr tablet, TAKE 1 TABLET BY MOUTH  DAILY, Disp: 90 tablet, Rfl: 3  •  Misc Natural Products (OSTEO BI-FLEX/5-LOXIN ADVANCED) TABS, Take by mouth daily, Disp: , Rfl:   • multivitamin-iron-minerals-folic acid (CENTRUM) chewable tablet, Chew 1 tablet daily, Disp: , Rfl:   •  tamsulosin (FLOMAX) 0 4 mg, TAKE 1 CAPSULE BY MOUTH  DAILY AT BEDTIME, Disp: 90 capsule, Rfl: 3  •  TURMERIC PO, Take by mouth Last dose 1014/22, Disp: , Rfl:   •  Vascepa 1 g CAPS, TAKE 2 CAPSULES BY MOUTH  TWICE DAILY (Patient taking differently: Last dose 10/14/22), Disp: 360 capsule, Rfl: 3    No Known Allergies    Objective: There were no vitals filed for this visit  Ortho Exam    Physical Exam    Left middle finger:  Sutures removed  Incisions clean dry and intact  No erythema appreciated  Near full range of motion of the digit  No triggering appreciated  Sensation intact left upper extremity  2+ radial pulse  This document was created using speech voice recognition software  Grammatical errors, random word insertions, pronoun errors, and incomplete sentences are an occasional consequence of this system due to software limitations, ambient noise, and hardware issues  Any formal questions or concerns about content, text, or information contained within the body of this dictation should be directly addressed to the provider for clarification

## 2022-11-09 DIAGNOSIS — N40.0 BPH WITHOUT URINARY OBSTRUCTION: ICD-10-CM

## 2022-11-09 RX ORDER — TAMSULOSIN HYDROCHLORIDE 0.4 MG/1
CAPSULE ORAL
Qty: 90 CAPSULE | Refills: 3 | Status: SHIPPED | OUTPATIENT
Start: 2022-11-09

## 2022-11-30 ENCOUNTER — TELEPHONE (OUTPATIENT)
Dept: CARDIOLOGY CLINIC | Facility: CLINIC | Age: 72
End: 2022-11-30

## 2022-12-02 ENCOUNTER — VBI (OUTPATIENT)
Dept: ADMINISTRATIVE | Facility: OTHER | Age: 72
End: 2022-12-02

## 2023-01-16 ENCOUNTER — TELEPHONE (OUTPATIENT)
Dept: CARDIOLOGY CLINIC | Facility: CLINIC | Age: 73
End: 2023-01-16

## 2023-01-20 LAB — HBA1C MFR BLD HPLC: 6.1 %

## 2023-01-27 ENCOUNTER — CLINICAL SUPPORT (OUTPATIENT)
Dept: FAMILY MEDICINE CLINIC | Facility: CLINIC | Age: 73
End: 2023-01-27

## 2023-01-27 DIAGNOSIS — Z23 NEED FOR INFLUENZA VACCINATION: Primary | ICD-10-CM

## 2023-02-06 ENCOUNTER — TELEPHONE (OUTPATIENT)
Dept: CARDIOLOGY CLINIC | Facility: CLINIC | Age: 73
End: 2023-02-06

## 2023-02-06 NOTE — TELEPHONE ENCOUNTER
Spoke with Pt's wife on Pt's behave stating that the pain he c/o before has returned but more aggressively  She wanted to know if its possible to adjust the atorvastatin  Or possible follow up

## 2023-02-13 DIAGNOSIS — E78.5 DYSLIPIDEMIA: ICD-10-CM

## 2023-02-13 RX ORDER — ICOSAPENT ETHYL 1000 MG/1
CAPSULE ORAL
Qty: 360 CAPSULE | Refills: 3 | Status: SHIPPED | OUTPATIENT
Start: 2023-02-13

## 2023-02-17 ENCOUNTER — OFFICE VISIT (OUTPATIENT)
Dept: CARDIOLOGY CLINIC | Facility: CLINIC | Age: 73
End: 2023-02-17

## 2023-02-17 VITALS
WEIGHT: 215 LBS | SYSTOLIC BLOOD PRESSURE: 136 MMHG | DIASTOLIC BLOOD PRESSURE: 70 MMHG | OXYGEN SATURATION: 97 % | HEART RATE: 76 BPM | BODY MASS INDEX: 35.82 KG/M2 | HEIGHT: 65 IN

## 2023-02-17 DIAGNOSIS — I10 ESSENTIAL HYPERTENSION: ICD-10-CM

## 2023-02-17 DIAGNOSIS — E66.9 CLASS 2 OBESITY WITHOUT SERIOUS COMORBIDITY WITH BODY MASS INDEX (BMI) OF 35.0 TO 35.9 IN ADULT, UNSPECIFIED OBESITY TYPE: ICD-10-CM

## 2023-02-17 DIAGNOSIS — E78.5 DYSLIPIDEMIA: ICD-10-CM

## 2023-02-17 DIAGNOSIS — I25.10 CAD IN NATIVE ARTERY: Primary | ICD-10-CM

## 2023-02-17 RX ORDER — ROSUVASTATIN CALCIUM 10 MG/1
10 TABLET, COATED ORAL DAILY
Qty: 90 TABLET | Refills: 2 | Status: SHIPPED | OUTPATIENT
Start: 2023-02-17

## 2023-02-17 NOTE — PROGRESS NOTES
Cardiology Follow Up    Kevin Rivera  1950  [id number]      Interval History: Kevin Rivera is here for follow up of CAD  Since his last visit, he developed myalgias and cramping  Atorvastatin was stopped 2 weeks ago  Since then, he does feel an improvement in symptoms  He denies any chest pain or shortness of breath  Most recent blood work was done on January 20, 2023 which showed LDL of 76, HDL of 34 and A1c of 6 1%  He had blood work done in August 2022 which showed LDL cholesterol of 68  Triglycerides of 109 and A1c of 6 2%  Current medication regimen includes Toprol XL 25 mg daily, lisinopril 20 mg daily, (atorvastatin 40 mg), Zetia 10 mg and Vascepa 2 g twice a day  He has a history of RCA stent in 2014  Prior to his PCI he had a burning sensation in his chest  He previously was on atorvastatin 80 mg daily which was reduced to 40 mg daily due to myalgias and elevated creatinine kinase  The following portions of the patient's history were reviewed and updated as appropriate:   He  has a past medical history of Abnormal electrocardiogram, Arthritis, Chest pain, Coronary artery disease, Enthesopathy of elbow (02/15/2006), Epididymo-orchitis (07/10/2012), Hyperlipidemia, Hypertension, Obesity, and Voice disturbance (05/27/2008)  He  has a past surgical history that includes Coronary angioplasty with stent; Appendectomy; Coronary angioplasty; Coronary angioplasty with stent (2013); pr neuroplasty &/transpos median nrv carpal tunne (Left, 8/13/2018); Angioplasty (2013); Carpal tunnel release (Left, 2018); pr neuroplasty &/transpos median nrv carpal tunne (Right, 9/19/2019); and pr tendon sheath incision (Left, 10/20/2022)    His family history includes Arthritis in his brother; Heart disease in his father and mother; Hypertension in his brother and mother; No Known Problems in his maternal aunt, maternal grandfather, maternal grandmother, maternal uncle, paternal aunt, paternal grandfather, paternal grandmother, and paternal uncle; Obesity in his brother  He  reports that he has never smoked  He has never used smokeless tobacco  He reports that he does not drink alcohol and does not use drugs  Current Outpatient Medications   Medication Sig Dispense Refill   • aspirin 81 MG tablet Take 1 tablet by mouth daily Last dose is 10/14/22     • Biotin 63420 MCG TABS Take 5,000 mg by mouth daily     • Cholecalciferol (VITAMIN D3) 2000 units capsule Take 2,000 Units by mouth daily       • Coenzyme Q10 (CO Q-10) 400 MG CAPS Take 1 capsule by mouth Daily     • CRANBERRY JUICE EXTRACT PO Take by mouth     • cycloSPORINE (RESTASIS) 0 05 % ophthalmic emulsion Administer to both eyes every 12 (twelve) hours       • ezetimibe (ZETIA) 10 mg tablet TAKE 1 TABLET BY MOUTH IN  THE MORNING 90 tablet 3   • Icosapent Ethyl 1 g CAPS TAKE 2 CAPSULES BY MOUTH  TWICE DAILY 360 capsule 3   • lisinopril (ZESTRIL) 20 mg tablet TAKE 1 TABLET BY MOUTH  DAILY 90 tablet 3   • metoprolol succinate (TOPROL-XL) 25 mg 24 hr tablet TAKE 1 TABLET BY MOUTH  DAILY 90 tablet 3   • Misc Natural Products (OSTEO BI-FLEX/5-LOXIN ADVANCED) TABS Take by mouth daily     • multivitamin-iron-minerals-folic acid (CENTRUM) chewable tablet Chew 1 tablet daily     • rosuvastatin (CRESTOR) 10 MG tablet Take 1 tablet (10 mg total) by mouth daily 90 tablet 2   • tamsulosin (FLOMAX) 0 4 mg TAKE 1 CAPSULE BY MOUTH  DAILY AT BEDTIME 90 capsule 3   • TURMERIC PO Take by mouth Last dose 1014/22     • calcium citrate-vitamin D (CITRACAL+D) 315-200 MG-UNIT per tablet Take 1 tablet by mouth 2 (two) times a day (Patient not taking: Reported on 11/16/2021)       No current facility-administered medications for this visit  He has No Known Allergies         Review of Systems:  Review of Systems   Respiratory: Negative for shortness of breath      Cardiovascular: Negative for chest pain, palpitations and leg swelling  Musculoskeletal: Positive for arthralgias and myalgias  All other systems reviewed and are negative  Physical Exam:  /70 (BP Location: Left arm, Patient Position: Sitting, Cuff Size: Large)   Pulse 76   Ht 5' 5" (1 651 m)   Wt 97 5 kg (215 lb)   SpO2 97%   BMI 35 78 kg/m²     Physical Exam  Constitutional:       General: He is not in acute distress  Appearance: He is well-developed  He is not diaphoretic  HENT:      Head: Normocephalic and atraumatic  Eyes:      Conjunctiva/sclera: Conjunctivae normal       Pupils: Pupils are equal, round, and reactive to light  Neck:      Thyroid: No thyromegaly  Vascular: No JVD  Cardiovascular:      Rate and Rhythm: Normal rate and regular rhythm  Heart sounds: Normal heart sounds  No murmur heard  No friction rub  No gallop  Pulmonary:      Effort: Pulmonary effort is normal       Breath sounds: Normal breath sounds  Musculoskeletal:      Cervical back: Neck supple  Right lower leg: No edema  Left lower leg: No edema  Skin:     General: Skin is warm and dry  Findings: No erythema or rash  Neurological:      General: No focal deficit present  Mental Status: He is alert and oriented to person, place, and time  Cranial Nerves: No cranial nerve deficit  Psychiatric:         Mood and Affect: Mood normal          Behavior: Behavior normal          Thought Content:  Thought content normal          Judgment: Judgment normal          Cardiographics  ECG:  Normal ECG    Labs:  Lab Results   Component Value Date     05/12/2017    K 4 7 09/27/2022    K 5 0 04/25/2022     09/27/2022     04/25/2022    CO2 29 09/27/2022    CO2 21 04/25/2022    BUN 12 09/27/2022    BUN 15 04/25/2022    CREATININE 0 88 09/27/2022    CREATININE 0 82 05/12/2017    GLUCOSE 94 05/12/2017    CALCIUM 9 4 09/27/2022    CALCIUM 8 8 05/12/2017     Lab Results   Component Value Date    WBC 7 96 09/27/2022    HGB 14 2 09/27/2022    HCT 43 5 09/27/2022     (H) 09/27/2022     09/27/2022     Lab Results   Component Value Date    CHOL 112 05/12/2017    TRIG 93 04/25/2022    HDL 37 (L) 04/25/2022     Imaging: No results found  Discussion/Summary:  1  Coronary artery disease involving native coronary artery of native heart without angina pectoris  -   Patient is free of angina  Continue Toprol XL 25 mg daily  -   Continue aspirin 81 mg daily for secondary prevention   -   Discussed diet and exercise  2  Essential hypertension  -   Blood pressure target is less than 130/80  Continue current medication regimen   - POCT ECG    3  Dyslipidemia  -   LDL target is less than 70 mg/dL  Triglycerides less than 150    -Atorvastatin stopped because of myalgias  We will wait 2 weeks and then begin Crestor 20 mg daily    - lipid panel to be done in 3 months after starting Crestor   - POCT ECG  - Continue Zetia

## 2023-03-15 ENCOUNTER — OFFICE VISIT (OUTPATIENT)
Dept: OBGYN CLINIC | Facility: CLINIC | Age: 73
End: 2023-03-15

## 2023-03-15 VITALS
WEIGHT: 215 LBS | SYSTOLIC BLOOD PRESSURE: 144 MMHG | HEART RATE: 74 BPM | BODY MASS INDEX: 35.82 KG/M2 | HEIGHT: 65 IN | DIASTOLIC BLOOD PRESSURE: 81 MMHG

## 2023-03-15 DIAGNOSIS — M75.82 TENDINITIS OF LEFT ROTATOR CUFF: ICD-10-CM

## 2023-03-15 DIAGNOSIS — M65.321 TRIGGER INDEX FINGER OF RIGHT HAND: Primary | ICD-10-CM

## 2023-03-15 DIAGNOSIS — M65.341 TRIGGER RING FINGER OF RIGHT HAND: ICD-10-CM

## 2023-03-15 RX ORDER — TRIAMCINOLONE ACETONIDE 40 MG/ML
40 INJECTION, SUSPENSION INTRA-ARTICULAR; INTRAMUSCULAR
Status: COMPLETED | OUTPATIENT
Start: 2023-03-15 | End: 2023-03-15

## 2023-03-15 RX ORDER — LIDOCAINE HYDROCHLORIDE 10 MG/ML
1 INJECTION, SOLUTION INFILTRATION; PERINEURAL
Status: COMPLETED | OUTPATIENT
Start: 2023-03-15 | End: 2023-03-15

## 2023-03-15 RX ADMIN — LIDOCAINE HYDROCHLORIDE 1 ML: 10 INJECTION, SOLUTION INFILTRATION; PERINEURAL at 19:44

## 2023-03-15 RX ADMIN — TRIAMCINOLONE ACETONIDE 40 MG: 40 INJECTION, SUSPENSION INTRA-ARTICULAR; INTRAMUSCULAR at 19:44

## 2023-03-15 NOTE — PROGRESS NOTES
Assessment/Plan:  1  Trigger index finger of right hand  Hand/upper extremity injection: R index A1      2  Trigger ring finger of right hand  Hand/upper extremity injection: R ring A1      3  Tendinitis of left rotator cuff          Scribe Attestation    I,:  Chow Rula am acting as a scribe while in the presence of the attending physician :       I,:  Helena Maxwell MD personally performed the services described in this documentation    as scribed in my presence :         Eliezer Joe upon exam today does demonstrate active trigger finger of the right index and right ring fingers  He is tender at the A1 pulley of the index and ring fingers  I provided him options of steroid injections for surgical intervention  He is elected to proceed with steroid injections in the index and ring fingers today  He tolerated the procedures well without complications  Regarding his left shoulder he demonstrates signs symptoms consistent with rotator cuff tendinitis  He has significant weakness in abduction as well as limitations in range of motion in all planes  I like him to start his physician directed home exercise program again  He states he has a copy of his HEP from prior visits that he may utilize  He will follow-up in 4 weeks for repeat clinical evaluation of his left shoulder and right hand  Hand/upper extremity injection: R ring A1  Universal Protocol:  Consent given by: patient  Time out: Immediately prior to procedure a "time out" was called to verify the correct patient, procedure, equipment, support staff and site/side marked as required    Site marked: the operative site was marked  Supporting Documentation  Indications: tendon swelling   Procedure Details  Condition:trigger finger Location: ring finger - R ring A1   Preparation: Patient was prepped and draped in the usual sterile fashion  Needle size: 25 G  Ultrasound guidance: no  Approach: volar  Medications administered: 1 mL lidocaine 1 %; 40 mg triamcinolone acetonide 40 mg/mL    Patient tolerance: patient tolerated the procedure well with no immediate complications  Dressing:  Sterile dressing applied     Hand/upper extremity injection: R index A1  Universal Protocol:  Consent given by: patient  Time out: Immediately prior to procedure a "time out" was called to verify the correct patient, procedure, equipment, support staff and site/side marked as required  Site marked: the operative site was marked  Supporting Documentation  Indications: tendon swelling   Procedure Details  Condition:trigger finger Location: index finger - R index A1   Preparation: Patient was prepped and draped in the usual sterile fashion  Needle size: 25 G  Ultrasound guidance: no  Approach: volar  Medications administered: 1 mL lidocaine 1 %; 40 mg triamcinolone acetonide 40 mg/mL    Patient tolerance: patient tolerated the procedure well with no immediate complications  Dressing:  Sterile dressing applied         Subjective:   Thao Acevedo is a 81 y o  male who presents today for evaluation of his right hand and left shoulder  He has active triggering in his index and ring finger that has been ongoing for quite a while  Some days are worse than others and he states his fingers do often stick in flexion  As for his shoulder he was last seen a year ago for shoulder pain  He has tried topical analgesic creams which provide temporary relief  He has difficulty reaching above his head  Review of Systems   Constitutional: Negative for chills, fever and unexpected weight change  HENT: Negative for nosebleeds and sore throat  Eyes: Negative for pain, redness and visual disturbance  Respiratory: Negative for cough, shortness of breath and wheezing  Cardiovascular: Negative for chest pain, palpitations and leg swelling  Gastrointestinal: Negative for nausea and vomiting  Endocrine: Negative for polydipsia and polyuria     Genitourinary: Negative for dysuria and hematuria  Musculoskeletal: Positive for myalgias  See HPI   Skin: Negative for rash and wound  Neurological: Negative for dizziness, numbness and headaches  Psychiatric/Behavioral: Negative for decreased concentration  The patient is not nervous/anxious            Past Medical History:   Diagnosis Date   • Abnormal electrocardiogram     last assessed 12/19/13   • Arthritis     hand and forearm   • Chest pain    • Coronary artery disease    • Enthesopathy of elbow 02/15/2006   • Epididymo-orchitis 07/10/2012   • Hyperlipidemia    • Hypertension    • Obesity    • Voice disturbance 05/27/2008       Past Surgical History:   Procedure Laterality Date   • ANGIOPLASTY  2013    one stent RCA   • APPENDECTOMY     • CARPAL TUNNEL RELEASE Left 2018   • CORONARY ANGIOPLASTY     • CORONARY ANGIOPLASTY WITH STENT PLACEMENT     • CORONARY ANGIOPLASTY WITH STENT PLACEMENT  2013   • AR NEUROPLASTY &/TRANSPOS MEDIAN NRV CARPAL Henrine Bonus Left 8/13/2018    Procedure: RELEASE CARPAL TUNNEL;  Surgeon: Nicholas Mcnair MD;  Location: [facility];  Service: Orthopedics   • AR NEUROPLASTY &/TRANSPOS MEDIAN NRV CARPAL Henrine Bonus Right 9/19/2019    Procedure: RELEASE CARPAL TUNNEL;  Surgeon: Nicholas Mcnair MD;  Location: [facility];  Service: Orthopedics   • AR TENDON SHEATH INCISION Left 10/20/2022    Procedure: RELEASE TRIGGER FINGER LONG FINGER;  Surgeon: An Hickey MD;  Location: [facility];  Service: Orthopedics       Family History   Problem Relation Age of Onset   • Heart disease Mother    • Hypertension Mother    • Heart disease Father         CHF   • Arthritis Brother    • Obesity Brother    • Hypertension Brother    • No Known Problems Maternal Aunt    • No Known Problems Maternal Uncle    • No Known Problems Paternal Aunt    • No Known Problems Paternal Uncle    • No Known Problems Maternal Grandmother    • No Known Problems Maternal Grandfather    • No Known Problems Paternal Grandmother    • No Known Problems Paternal Grandfather        Social History     Occupational History   • Not on file   Tobacco Use   • Smoking status: Never   • Smokeless tobacco: Never   Vaping Use   • Vaping Use: Never used   Substance and Sexual Activity   • Alcohol use: No   • Drug use: No   • Sexual activity: Not on file         Current Outpatient Medications:   •  rosuvastatin (CRESTOR) 10 MG tablet, Take 1 tablet (10 mg total) by mouth daily, Disp: 90 tablet, Rfl: 2  •  aspirin 81 MG tablet, Take 1 tablet by mouth daily Last dose is 10/14/22, Disp: , Rfl:   •  Biotin 87537 MCG TABS, Take 5,000 mg by mouth daily, Disp: , Rfl:   •  calcium citrate-vitamin D (CITRACAL+D) 315-200 MG-UNIT per tablet, Take 1 tablet by mouth 2 (two) times a day (Patient not taking: Reported on 11/16/2021), Disp: , Rfl:   •  Cholecalciferol (VITAMIN D3) 2000 units capsule, Take 2,000 Units by mouth daily  , Disp: , Rfl:   •  Coenzyme Q10 (CO Q-10) 400 MG CAPS, Take 1 capsule by mouth Daily, Disp: , Rfl:   •  CRANBERRY JUICE EXTRACT PO, Take by mouth, Disp: , Rfl:   •  cycloSPORINE (RESTASIS) 0 05 % ophthalmic emulsion, Administer to both eyes every 12 (twelve) hours  , Disp: , Rfl:   •  ezetimibe (ZETIA) 10 mg tablet, TAKE 1 TABLET BY MOUTH IN  THE MORNING, Disp: 90 tablet, Rfl: 3  •  Icosapent Ethyl 1 g CAPS, TAKE 2 CAPSULES BY MOUTH  TWICE DAILY, Disp: 360 capsule, Rfl: 3  •  lisinopril (ZESTRIL) 20 mg tablet, TAKE 1 TABLET BY MOUTH  DAILY, Disp: 90 tablet, Rfl: 3  •  metoprolol succinate (TOPROL-XL) 25 mg 24 hr tablet, TAKE 1 TABLET BY MOUTH  DAILY, Disp: 90 tablet, Rfl: 3  •  Misc Natural Products (OSTEO BI-FLEX/5-LOXIN ADVANCED) TABS, Take by mouth daily, Disp: , Rfl:   •  multivitamin-iron-minerals-folic acid (CENTRUM) chewable tablet, Chew 1 tablet daily, Disp: , Rfl:   •  tamsulosin (FLOMAX) 0 4 mg, TAKE 1 CAPSULE BY MOUTH  DAILY AT BEDTIME, Disp: 90 capsule, Rfl: 3  •  TURMERIC PO, Take by mouth Last dose 1014/22, Disp: , Rfl:     No Known Allergies    Objective:  Vitals:    03/15/23 1411   BP: 144/81   Pulse: 74       Right Hand Exam     Comments:  A1 pulley tenderness in index and ring finger  Active triggering index and ring finger  Left Shoulder Exam     Range of Motion   Active abduction: 100   External rotation: 40   Internal rotation 0 degrees: Sacrum     Muscle Strength   Internal rotation: 5/5   External rotation: 5/5   Subscapularis: 5/5   Biceps: 4/5     Comments:  4-/5 abduction/ supraspinatus  Positive speeds            Physical Exam  HENT:      Head: Normocephalic and atraumatic  Eyes:      General:         Right eye: No discharge  Left eye: No discharge  Conjunctiva/sclera: Conjunctivae normal    Cardiovascular:      Rate and Rhythm: Normal rate  Pulmonary:      Effort: Pulmonary effort is normal  No respiratory distress  Musculoskeletal:         General: Tenderness present  No swelling  Left shoulder: Decreased range of motion  Decreased strength  Right hand: Tenderness present  Cervical back: Normal range of motion and neck supple  Comments: See Ortho exam    Skin:     General: Skin is warm and dry  Neurological:      Mental Status: He is alert and oriented to person, place, and time  I have personally reviewed pertinent films in PACS and my interpretation is as follows: No imaging reviewed today  This document was created using speech voice recognition software  Grammatical errors, random word insertions, pronoun errors, and incomplete sentences are an occasional consequence of this system due to software limitations, ambient noise, and hardware issues  Any formal questions or concerns about content, text, or information contained within the body of this dictation should be directly addressed to the provider for clarification

## 2023-03-19 DIAGNOSIS — I10 ESSENTIAL HYPERTENSION: ICD-10-CM

## 2023-03-19 DIAGNOSIS — E78.5 DYSLIPIDEMIA: ICD-10-CM

## 2023-03-20 RX ORDER — EZETIMIBE 10 MG/1
TABLET ORAL
Qty: 90 TABLET | Refills: 3 | Status: SHIPPED | OUTPATIENT
Start: 2023-03-20

## 2023-03-20 RX ORDER — LISINOPRIL 20 MG/1
TABLET ORAL
Qty: 90 TABLET | Refills: 3 | Status: SHIPPED | OUTPATIENT
Start: 2023-03-20

## 2023-05-22 NOTE — PROGRESS NOTES
Daily Note     Today's date: 2020  Patient name: Zenovia Leventhal  : 1950  MRN: [id number]  Referring provider: Juliet Broderick DO  Dx:   Encounter Diagnosis     ICD-10-CM    1  Hip tightness R29 898    2  Acute midline low back pain with bilateral sciatica M54 42     M54 41                   Subjective: My back and hip are feeling much better  Objective: See treatment diary below      Assessment: Tolerated treatment well  Patient demonstrated fatigue post treatment and exhibited good technique with therapeutic exercises      Plan: Continue per plan of care        Precautions: HTN, CAD      Manual            B HS/gastroc stretching  performed  perf                                                                 Exercise Diary  2/11 2/13 2/18 2/20 2/15 2/28       Chaka day 1 perf review  hep         NuStep  10min L1  10min L1 15 min 20min       Heels slides  20x  20x         LTR  20x  20x         Supine clamshells  Blue tb x 20  Blue tb x 20         Neurac  perf  -----         Self HS stretching  [id number] 5x15s reviewed         Neurac supine pelvic lift   2x5 --- 2x5 2x5       Neurac bridge   2x5 -- 2x5 2x5       Neurac SDLY Hip ABD   2x5 -- 2x5 2x5       Neurac SDLY Hip ADD   2x5 --- 2x52x5 2x5       Knee to chest   2x5 --         Piriformis supine stretch   2x5s perf perf perf       Hip ADD ball squeeze   20x --         elpidio walk outs    @#6 - 4 x 5 reps #8 4x10 #10 4x10       Bridging on red pb    2x10         Hip Circles w strap     2x10 20x                                                  Modalities     /20         MH to L/S -> HS (seated)    10min Itraconazole Counseling:  I discussed with the patient the risks of itraconazole including but not limited to liver damage, nausea/vomiting, neuropathy, and severe allergy.  The patient understands that this medication is best absorbed when taken with acidic beverages such as non-diet cola or ginger ale.  The patient understands that monitoring is required including baseline LFTs and repeat LFTs at intervals.  The patient understands that they are to contact us or the primary physician if concerning signs are noted.

## 2023-06-20 ENCOUNTER — OFFICE VISIT (OUTPATIENT)
Dept: CARDIOLOGY CLINIC | Facility: CLINIC | Age: 73
End: 2023-06-20
Payer: COMMERCIAL

## 2023-06-20 VITALS
WEIGHT: 212 LBS | HEART RATE: 86 BPM | TEMPERATURE: 97.8 F | DIASTOLIC BLOOD PRESSURE: 64 MMHG | OXYGEN SATURATION: 96 % | BODY MASS INDEX: 35.32 KG/M2 | HEIGHT: 65 IN | SYSTOLIC BLOOD PRESSURE: 148 MMHG

## 2023-06-20 DIAGNOSIS — E78.5 DYSLIPIDEMIA: Primary | ICD-10-CM

## 2023-06-20 DIAGNOSIS — I10 ESSENTIAL HYPERTENSION: ICD-10-CM

## 2023-06-20 DIAGNOSIS — I25.10 CAD IN NATIVE ARTERY: ICD-10-CM

## 2023-06-20 DIAGNOSIS — E66.9 CLASS 2 OBESITY WITHOUT SERIOUS COMORBIDITY WITH BODY MASS INDEX (BMI) OF 35.0 TO 35.9 IN ADULT, UNSPECIFIED OBESITY TYPE: ICD-10-CM

## 2023-06-20 PROCEDURE — 99214 OFFICE O/P EST MOD 30 MIN: CPT | Performed by: INTERNAL MEDICINE

## 2023-06-20 RX ORDER — ROSUVASTATIN CALCIUM 20 MG/1
20 TABLET, COATED ORAL DAILY
Qty: 90 TABLET | Refills: 3 | Status: SHIPPED | OUTPATIENT
Start: 2023-06-20

## 2023-06-20 NOTE — PROGRESS NOTES
Cardiology Follow Up    Matthew Frazier  1950  [id number]      Interval History: Matthew Frazier is here for follow up of CAD  Since his last visit, he has been feeling well   he denies any palpitations, chest pain, shortness of breath, LE edema, orthopnea or PND  Diet is overall unchanged  There has not been a significant change in weight  Most recent blood work was done on May 17, 2023 which showed an LDL of 83, triglycerides of 116, total cholesterol 144 and HDL of 40  In January 20, 2023 lab work showed LDL of 76, HDL of 34 and A1c of 6 1%  He had blood work done in August 2022 which showed LDL cholesterol of 68  Triglycerides of 109 and A1c of 6 2%  Current medication regimen includes Toprol XL 25 mg daily, lisinopril 20 mg daily, rosuvastatin 10 mg, Zetia 10 mg and Vascepa 2 g twice a day  He has a history of RCA stent in 2014  Prior to his PCI he had a burning sensation in his chest  He previously was on atorvastatin 80 mg daily which was reduced to 40 mg daily due to myalgias and elevated creatinine kinase  The following portions of the patient's history were reviewed and updated as appropriate:   He  has a past medical history of Abnormal electrocardiogram, Arthritis, Chest pain, Coronary artery disease, Enthesopathy of elbow (02/15/2006), Epididymo-orchitis (07/10/2012), Hyperlipidemia, Hypertension, Obesity, and Voice disturbance (05/27/2008)  He  has a past surgical history that includes Coronary angioplasty with stent; Appendectomy; Coronary angioplasty; Coronary angioplasty with stent (2013); pr neuroplasty &/transpos median nrv carpal tunne (Left, 8/13/2018); Angioplasty (2013); Carpal tunnel release (Left, 2018); pr neuroplasty &/transpos median nrv carpal tunne (Right, 9/19/2019); and pr tendon sheath incision (Left, 10/20/2022)    His family history includes Arthritis in his brother; Heart disease in his father and mother; Hypertension in his brother and mother; No Known Problems in his maternal aunt, maternal grandfather, maternal grandmother, maternal uncle, paternal aunt, paternal grandfather, paternal grandmother, and paternal uncle; Obesity in his brother  He  reports that he has never smoked  He has never used smokeless tobacco  He reports that he does not drink alcohol and does not use drugs  Current Outpatient Medications   Medication Sig Dispense Refill   • aspirin 81 MG tablet Take 1 tablet by mouth daily Last dose is 10/14/22     • Biotin 51796 MCG TABS Take 5,000 mg by mouth daily     • calcium citrate-vitamin D (CITRACAL+D) 315-200 MG-UNIT per tablet Take 1 tablet by mouth 2 (two) times a day     • Cholecalciferol (VITAMIN D3) 2000 units capsule Take 2,000 Units by mouth daily       • Coenzyme Q10 (CO Q-10) 400 MG CAPS Take 1 capsule by mouth Daily     • CRANBERRY JUICE EXTRACT PO Take by mouth     • cycloSPORINE (RESTASIS) 0 05 % ophthalmic emulsion Administer to both eyes every 12 (twelve) hours       • ezetimibe (ZETIA) 10 mg tablet TAKE 1 TABLET BY MOUTH IN  THE MORNING 90 tablet 3   • Icosapent Ethyl 1 g CAPS TAKE 2 CAPSULES BY MOUTH  TWICE DAILY 360 capsule 3   • lisinopril (ZESTRIL) 20 mg tablet TAKE 1 TABLET BY MOUTH  DAILY 90 tablet 3   • metoprolol succinate (TOPROL-XL) 25 mg 24 hr tablet TAKE 1 TABLET BY MOUTH  DAILY 90 tablet 3   • Misc Natural Products (OSTEO BI-FLEX/5-LOXIN ADVANCED) TABS Take by mouth daily     • multivitamin-iron-minerals-folic acid (CENTRUM) chewable tablet Chew 1 tablet daily     • rosuvastatin (CRESTOR) 10 MG tablet Take 1 tablet (10 mg total) by mouth daily 90 tablet 2   • tamsulosin (FLOMAX) 0 4 mg TAKE 1 CAPSULE BY MOUTH  DAILY AT BEDTIME 90 capsule 3   • TURMERIC PO Take by mouth Last dose 1014/22       No current facility-administered medications for this visit  He has No Known Allergies         Review of Systems:  Review of Systems   Respiratory: Negative "for shortness of breath  Cardiovascular: Negative for chest pain, palpitations and leg swelling  Musculoskeletal: Positive for arthralgias  All other systems reviewed and are negative  Physical Exam:  /64 (BP Location: Left arm, Patient Position: Sitting, Cuff Size: Large)   Pulse 86   Temp 97 8 °F (36 6 °C)   Ht 5' 5\" (1 651 m)   Wt 96 2 kg (212 lb)   SpO2 96%   BMI 35 28 kg/m²     Physical Exam  Constitutional:       General: He is not in acute distress  Appearance: He is well-developed  He is not diaphoretic  HENT:      Head: Normocephalic and atraumatic  Eyes:      Conjunctiva/sclera: Conjunctivae normal       Pupils: Pupils are equal, round, and reactive to light  Neck:      Thyroid: No thyromegaly  Vascular: No JVD  Cardiovascular:      Rate and Rhythm: Normal rate and regular rhythm  Heart sounds: Normal heart sounds  No murmur heard  No friction rub  No gallop  Pulmonary:      Effort: Pulmonary effort is normal       Breath sounds: Normal breath sounds  Musculoskeletal:      Cervical back: Neck supple  Right lower leg: No edema  Left lower leg: No edema  Skin:     General: Skin is warm and dry  Findings: No erythema or rash  Neurological:      General: No focal deficit present  Mental Status: He is alert and oriented to person, place, and time  Cranial Nerves: No cranial nerve deficit  Psychiatric:         Mood and Affect: Mood normal          Behavior: Behavior normal          Thought Content:  Thought content normal          Judgment: Judgment normal          Cardiographics  ECG:  Normal ECG    Labs:  Lab Results   Component Value Date     05/12/2017    K 4 7 09/27/2022    K 5 0 04/25/2022     09/27/2022     04/25/2022    CO2 29 09/27/2022    CO2 21 04/25/2022    BUN 12 09/27/2022    BUN 15 04/25/2022    CREATININE 0 88 09/27/2022    CREATININE 0 82 05/12/2017    GLUCOSE 94 05/12/2017    CALCIUM 9 4 " 09/27/2022    CALCIUM 8 8 05/12/2017     Lab Results   Component Value Date    WBC 7 96 09/27/2022    HGB 14 2 09/27/2022    HCT 43 5 09/27/2022     (H) 09/27/2022     09/27/2022     Lab Results   Component Value Date    CHOL 112 05/12/2017    TRIG 93 04/25/2022    HDL 37 (L) 04/25/2022     Imaging: No results found  Discussion/Summary:  1  Coronary artery disease involving native coronary artery of native heart without angina pectoris  -   Patient is free of angina  Continue Toprol XL 25 mg daily  -   Continue aspirin 81 mg daily for secondary prevention   -   Discussed diet and exercise  2  Essential hypertension  -   Blood pressure target is less than 130/80  Continue current medication regimen   - POCT ECG    3  Dyslipidemia  -   LDL target is less than 70 mg/dL  Triglycerides less than 150    -Atorvastatin stopped because of myalgias    Feels better with Crestor  - Will increase Crestor to 20 mg daily     - POCT ECG  - Continue Zetia

## 2023-08-25 DIAGNOSIS — I25.10 CORONARY ARTERY DISEASE INVOLVING NATIVE CORONARY ARTERY OF NATIVE HEART WITHOUT ANGINA PECTORIS: ICD-10-CM

## 2023-08-25 RX ORDER — METOPROLOL SUCCINATE 25 MG/1
25 TABLET, EXTENDED RELEASE ORAL DAILY
Qty: 90 TABLET | Refills: 3 | Status: SHIPPED | OUTPATIENT
Start: 2023-08-25

## 2023-08-30 ENCOUNTER — HOSPITAL ENCOUNTER (OUTPATIENT)
Dept: NON INVASIVE DIAGNOSTICS | Facility: HOSPITAL | Age: 73
Discharge: HOME | End: 2023-08-30
Attending: INTERNAL MEDICINE
Payer: COMMERCIAL

## 2023-08-30 VITALS
WEIGHT: 212 LBS | HEIGHT: 65 IN | DIASTOLIC BLOOD PRESSURE: 72 MMHG | BODY MASS INDEX: 35.32 KG/M2 | OXYGEN SATURATION: 99 % | HEART RATE: 75 BPM | SYSTOLIC BLOOD PRESSURE: 116 MMHG

## 2023-08-30 DIAGNOSIS — R94.39 ABNORMAL STRESS ELECTROCARDIOGRAM TEST USING TREADMILL: Primary | ICD-10-CM

## 2023-08-30 DIAGNOSIS — I25.10 CAD IN NATIVE ARTERY: ICD-10-CM

## 2023-08-30 DIAGNOSIS — R93.1 ABNORMAL FINDINGS ON DIAGNOSTIC IMAGING OF HEART AND CORONARY CIRCULATION: ICD-10-CM

## 2023-08-30 LAB
ATRIAL RATE: 133 BPM
CHEST PAIN STATEMENT: NORMAL
MAX DIASTOLIC BP: 72 MMHG
MAX HEART RATE: 144 BPM
MAX HR PERCENT: 96 %
MAX HR: 144 BPM
MAX PREDICTED HEART RATE: 150 BPM
MAX. SYSTOLIC BP: 202 MMHG
P AXIS: 70 DEGREES
PR INTERVAL: 132 MS
PROTOCOL NAME: NORMAL
QRS AXIS: -29 DEGREES
QRSD INTERVAL: 122 MS
QT INTERVAL: 314 MS
QTC INTERVAL: 467 MS
RATE PRESSURE PRODUCT: NORMAL
REASON FOR TERMINATION: NORMAL
SL CV STRESS RECOVERY BP: NORMAL MMHG
SL CV STRESS RECOVERY HR: 94 BPM
SL CV STRESS RECOVERY O2 SAT: 98 %
SL CV STRESS STAGE REACHED: 3
STRESS ANGINA INDEX: 0
STRESS BASELINE BP: NORMAL MMHG
STRESS BASELINE HR: 75 BPM
STRESS O2 SAT REST: 99 %
STRESS PEAK HR: 142 BPM
STRESS POST ESTIMATED WORKLOAD: 10.1 METS
STRESS POST EXERCISE DUR MIN: 9 MIN
STRESS POST EXERCISE DUR SEC: 0 SEC
STRESS POST O2 SAT PEAK: 99 %
STRESS POST PEAK BP: 202 MMHG
T WAVE AXIS: 103 DEGREES
TARGET HR FORMULA: NORMAL
TEST INDICATION: NORMAL
TIME IN EXERCISE PHASE: NORMAL
VENTRICULAR RATE: 133 BPM

## 2023-08-30 PROCEDURE — 93018 CV STRESS TEST I&R ONLY: CPT | Performed by: INTERNAL MEDICINE

## 2023-08-30 PROCEDURE — 93017 CV STRESS TEST TRACING ONLY: CPT

## 2023-08-30 PROCEDURE — 93010 ELECTROCARDIOGRAM REPORT: CPT | Performed by: INTERNAL MEDICINE

## 2023-08-30 PROCEDURE — 93005 ELECTROCARDIOGRAM TRACING: CPT

## 2023-08-30 PROCEDURE — 93016 CV STRESS TEST SUPVJ ONLY: CPT | Performed by: INTERNAL MEDICINE

## 2023-08-31 ENCOUNTER — TELEPHONE (OUTPATIENT)
Dept: CARDIOLOGY CLINIC | Facility: CLINIC | Age: 73
End: 2023-08-31

## 2023-09-01 ENCOUNTER — TELEPHONE (OUTPATIENT)
Dept: CARDIOLOGY CLINIC | Facility: CLINIC | Age: 73
End: 2023-09-01

## 2023-09-01 NOTE — TELEPHONE ENCOUNTER
----- Message from Giovanni Hernandez DO sent at 8/30/2023  3:17 PM EDT -----  Can you please let the patient know His test was indeterminate.     I would like to repeat his study with nuclear test.

## 2023-09-22 ENCOUNTER — HOSPITAL ENCOUNTER (OUTPATIENT)
Dept: RADIOLOGY | Facility: HOSPITAL | Age: 73
Discharge: HOME | End: 2023-09-22
Attending: INTERNAL MEDICINE
Payer: COMMERCIAL

## 2023-09-22 ENCOUNTER — HOSPITAL ENCOUNTER (OUTPATIENT)
Dept: NON INVASIVE DIAGNOSTICS | Facility: HOSPITAL | Age: 73
Discharge: HOME | End: 2023-09-22
Attending: INTERNAL MEDICINE
Payer: COMMERCIAL

## 2023-09-22 VITALS
SYSTOLIC BLOOD PRESSURE: 116 MMHG | RESPIRATION RATE: 20 BRPM | BODY MASS INDEX: 34.07 KG/M2 | OXYGEN SATURATION: 100 % | HEIGHT: 66 IN | HEART RATE: 66 BPM | DIASTOLIC BLOOD PRESSURE: 80 MMHG | WEIGHT: 212 LBS

## 2023-09-22 DIAGNOSIS — R94.39 ABNORMAL STRESS ELECTROCARDIOGRAM TEST USING TREADMILL: ICD-10-CM

## 2023-09-22 DIAGNOSIS — R93.1 ABNORMAL FINDINGS ON DIAGNOSTIC IMAGING OF HEART AND CORONARY CIRCULATION: ICD-10-CM

## 2023-09-22 LAB
CHEST PAIN STATEMENT: NORMAL
MAX DIASTOLIC BP: 72 MMHG
MAX HEART RATE: 136 BPM
MAX HR PERCENT: 90 %
MAX HR: 136 BPM
MAX PREDICTED HEART RATE: 150 BPM
MAX. SYSTOLIC BP: 180 MMHG
NUC STRESS EJECTION FRACTION: 36 %
PROTOCOL NAME: NORMAL
RATE PRESSURE PRODUCT: NORMAL
REASON FOR TERMINATION: NORMAL
SL CV REST NUCLEAR ISOTOPE DOSE: 11 MCI
SL CV STRESS NUCLEAR ISOTOPE DOSE: 32.7 MCI
SL CV STRESS RECOVERY BP: NORMAL MMHG
SL CV STRESS RECOVERY HR: 85 BPM
SL CV STRESS RECOVERY O2 SAT: 98 %
SL CV STRESS STAGE REACHED: 3
STRESS ANGINA INDEX: 0
STRESS BASELINE BP: 116 MMHG
STRESS BASELINE HR: 66 BPM
STRESS O2 SAT REST: 100 %
STRESS PEAK HR: 134 BPM
STRESS POST ESTIMATED WORKLOAD: 10.1 METS
STRESS POST EXERCISE DUR MIN: 7 MIN
STRESS POST O2 SAT PEAK: 98 %
STRESS POST PEAK BP: 160 MMHG
STRESS/REST PERFUSION RATIO: 0.98
TARGET HR FORMULA: NORMAL
TEST INDICATION: NORMAL
TIME IN EXERCISE PHASE: NORMAL

## 2023-09-22 PROCEDURE — 78452 HT MUSCLE IMAGE SPECT MULT: CPT | Performed by: INTERNAL MEDICINE

## 2023-09-22 PROCEDURE — 78452 HT MUSCLE IMAGE SPECT MULT: CPT

## 2023-09-22 PROCEDURE — 93017 CV STRESS TEST TRACING ONLY: CPT

## 2023-09-22 PROCEDURE — A9502 TC99M TETROFOSMIN: HCPCS

## 2023-09-22 PROCEDURE — 93016 CV STRESS TEST SUPVJ ONLY: CPT | Performed by: INTERNAL MEDICINE

## 2023-09-22 PROCEDURE — G1004 CDSM NDSC: HCPCS

## 2023-09-22 PROCEDURE — 93018 CV STRESS TEST I&R ONLY: CPT | Performed by: INTERNAL MEDICINE

## 2023-09-25 DIAGNOSIS — I25.10 CAD IN NATIVE ARTERY: ICD-10-CM

## 2023-09-25 DIAGNOSIS — R94.39 ABNORMAL STRESS ELECTROCARDIOGRAM TEST USING TREADMILL: Primary | ICD-10-CM

## 2023-09-25 DIAGNOSIS — R93.1 ABNORMAL FINDINGS ON DIAGNOSTIC IMAGING OF HEART AND CORONARY CIRCULATION: ICD-10-CM

## 2023-09-26 ENCOUNTER — TELEPHONE (OUTPATIENT)
Dept: CARDIOLOGY CLINIC | Facility: CLINIC | Age: 73
End: 2023-09-26

## 2023-09-26 NOTE — TELEPHONE ENCOUNTER
----- Message from Jeb Began, DO sent at 9/25/2023  4:43 PM EDT -----  Called and left message. Stress test was overall normal.  Ejection fraction was reduced which may be artifact from bundle branch block. Need an echo. Order placed. Pt returning call for test results Hungarian speaking

## 2023-09-26 NOTE — TELEPHONE ENCOUNTER
Confirmed with pt.'s Wife that they received 's message. They will central scheduling to schedule the Echo.

## 2023-10-05 ENCOUNTER — HOSPITAL ENCOUNTER (OUTPATIENT)
Dept: NON INVASIVE DIAGNOSTICS | Facility: HOSPITAL | Age: 73
Discharge: HOME | End: 2023-10-05
Attending: INTERNAL MEDICINE
Payer: COMMERCIAL

## 2023-10-05 VITALS
BODY MASS INDEX: 34.07 KG/M2 | HEART RATE: 82 BPM | SYSTOLIC BLOOD PRESSURE: 158 MMHG | WEIGHT: 212 LBS | HEIGHT: 66 IN | DIASTOLIC BLOOD PRESSURE: 71 MMHG

## 2023-10-05 DIAGNOSIS — R93.1 ABNORMAL FINDINGS ON DIAGNOSTIC IMAGING OF HEART AND CORONARY CIRCULATION: ICD-10-CM

## 2023-10-05 DIAGNOSIS — I25.10 CAD IN NATIVE ARTERY: ICD-10-CM

## 2023-10-05 PROCEDURE — 93306 TTE W/DOPPLER COMPLETE: CPT

## 2023-10-06 ENCOUNTER — TELEPHONE (OUTPATIENT)
Dept: CARDIOLOGY CLINIC | Facility: CLINIC | Age: 73
End: 2023-10-06

## 2023-10-06 LAB
AORTIC ROOT: 3.1 CM
APICAL FOUR CHAMBER EJECTION FRACTION: 59 %
AV REGURGITATION PRESSURE HALF TIME: 633 MS
E WAVE DECELERATION TIME: 152 MS
FRACTIONAL SHORTENING: 40 (ref 28–44)
INTERVENTRICULAR SEPTUM IN DIASTOLE (PARASTERNAL SHORT AXIS VIEW): 0.9 CM
INTERVENTRICULAR SEPTUM: 0.9 CM (ref 0.6–1.1)
LAAS-AP2: 21.3 CM2
LAAS-AP4: 17.9 CM2
LEFT ATRIUM SIZE: 3.7 CM
LEFT ATRIUM VOLUME (MOD BIPLANE): 57 ML
LEFT ATRIUM VOLUME INDEX (MOD BIPLANE): 27.8
LEFT INTERNAL DIMENSION IN SYSTOLE: 2.7 CM (ref 2.1–4)
LEFT VENTRICULAR INTERNAL DIMENSION IN DIASTOLE: 4.5 CM (ref 3.5–6)
LEFT VENTRICULAR POSTERIOR WALL IN END DIASTOLE: 1 CM
LEFT VENTRICULAR STROKE VOLUME: 63 ML
LVSV (TEICH): 63 ML
MV E'TISSUE VEL-LAT: 10 CM/S
MV E'TISSUE VEL-SEP: 7 CM/S
MV PEAK A VEL: 0.96 M/S
MV PEAK E VEL: 72 CM/S
MV STENOSIS PRESSURE HALF TIME: 44 MS
MV VALVE AREA P 1/2 METHOD: 5
RIGHT ATRIUM AREA SYSTOLE A4C: 13 CM2
RIGHT VENTRICLE ID DIMENSION: 3 CM
SL CV AV DECELERATION TIME RETROGRADE: 2184 MS
SL CV AV PEAK GRADIENT RETROGRADE: 49 MMHG
SL CV LEFT ATRIUM LENGTH A2C: 5.7 CM
SL CV LV EF: 60
SL CV PED ECHO LEFT VENTRICLE DIASTOLIC VOLUME (MOD BIPLANE) 2D: 91 ML
SL CV PED ECHO LEFT VENTRICLE SYSTOLIC VOLUME (MOD BIPLANE) 2D: 28 ML
TR MAX PG: 36 MMHG
TR PEAK VELOCITY: 3 M/S
TRICUSPID ANNULAR PLANE SYSTOLIC EXCURSION: 2 CM
TRICUSPID VALVE PEAK REGURGITATION VELOCITY: 2.98 M/S

## 2023-10-06 PROCEDURE — 93306 TTE W/DOPPLER COMPLETE: CPT | Performed by: INTERNAL MEDICINE

## 2023-10-06 NOTE — TELEPHONE ENCOUNTER
----- Message from Dyan Pham DO sent at 10/6/2023  1:17 PM EDT -----  Can you please let the patient know echo was normal  - heart was strong  No more heart tests for now

## 2023-10-09 ENCOUNTER — OFFICE VISIT (OUTPATIENT)
Dept: CARDIOLOGY CLINIC | Facility: CLINIC | Age: 73
End: 2023-10-09
Payer: COMMERCIAL

## 2023-10-09 VITALS
BODY MASS INDEX: 33.89 KG/M2 | HEIGHT: 66 IN | DIASTOLIC BLOOD PRESSURE: 80 MMHG | WEIGHT: 210.9 LBS | HEART RATE: 93 BPM | OXYGEN SATURATION: 97 % | SYSTOLIC BLOOD PRESSURE: 120 MMHG

## 2023-10-09 DIAGNOSIS — I25.9 ISCHEMIC HEART DISEASE, CHRONIC: Primary | ICD-10-CM

## 2023-10-09 DIAGNOSIS — Z95.5 S/P RIGHT CORONARY ARTERY (RCA) STENT PLACEMENT: ICD-10-CM

## 2023-10-09 DIAGNOSIS — E78.5 DYSLIPIDEMIA: ICD-10-CM

## 2023-10-09 DIAGNOSIS — I10 ESSENTIAL HYPERTENSION: ICD-10-CM

## 2023-10-09 DIAGNOSIS — I25.10 CAD IN NATIVE ARTERY: ICD-10-CM

## 2023-10-09 PROCEDURE — 99214 OFFICE O/P EST MOD 30 MIN: CPT | Performed by: INTERNAL MEDICINE

## 2023-10-09 NOTE — PROGRESS NOTES
Cardiology Follow Up    Alex Cyr  1950  [id number]      Interval History: Alex Cyr is here for follow up of CAD. Since his last visit, he has been feeling well.  he denies any palpitations, chest pain, shortness of breath, LE edema, orthopnea or PND. Diet is overall unchanged. There has not been a significant change in weight. During his last visit, stress test was ordered and he was found to have a rate related left bundle branch block. He subsequently underwent nuclear stress test which was normal with a reduced EF. Echocardiogram done afterwards revealed normal EF. He has no complaints at this time. Most recent blood work was done on May 17, 2023 which showed an LDL of 83, triglycerides of 116, total cholesterol 144 and HDL of 40. In January 20, 2023 lab work showed LDL of 76, HDL of 34 and A1c of 6.1%. He had blood work done in August 2022 which showed LDL cholesterol of 68. Triglycerides of 109 and A1c of 6.2%. Current medication regimen includes Toprol XL 25 mg daily, lisinopril 20 mg daily, rosuvastatin 10 mg, Zetia 10 mg and Vascepa 2 g twice a day. He has a history of RCA stent in 2014. Prior to his PCI he had a burning sensation in his chest. He previously was on atorvastatin 80 mg daily which was reduced to 40 mg daily due to myalgias and elevated creatinine kinase. The following portions of the patient's history were reviewed and updated as appropriate:   He  has a past medical history of Abnormal electrocardiogram, Arthritis, Chest pain, Coronary artery disease, Enthesopathy of elbow (02/15/2006), Epididymo-orchitis (07/10/2012), Hyperlipidemia, Hypertension, Obesity, and Voice disturbance (05/27/2008). He  has a past surgical history that includes Coronary angioplasty with stent;  Appendectomy; Coronary angioplasty; Coronary angioplasty with stent (2013); pr neuroplasty &/transpos median nrv carpal tunne (Left, 8/13/2018); Angioplasty (2013); Carpal tunnel release (Left, 2018); pr neuroplasty &/transpos median nrv carpal tunne (Right, 9/19/2019); and pr tendon sheath incision (Left, 10/20/2022). His family history includes Arthritis in his brother; Heart disease in his father and mother; Hypertension in his brother and mother; No Known Problems in his maternal aunt, maternal grandfather, maternal grandmother, maternal uncle, paternal aunt, paternal grandfather, paternal grandmother, and paternal uncle; Obesity in his brother. He  reports that he has never smoked. He has never used smokeless tobacco. He reports that he does not drink alcohol and does not use drugs.   Current Outpatient Medications   Medication Sig Dispense Refill   • aspirin 81 MG tablet Take 1 tablet by mouth daily Last dose is 10/14/22     • Biotin 54323 MCG TABS Take 5,000 mg by mouth daily     • calcium citrate-vitamin D (CITRACAL+D) 315-200 MG-UNIT per tablet Take 1 tablet by mouth 2 (two) times a day     • Cholecalciferol (VITAMIN D3) 2000 units capsule Take 2,000 Units by mouth daily       • Coenzyme Q10 (CO Q-10) 400 MG CAPS Take 1 capsule by mouth Daily     • CRANBERRY JUICE EXTRACT PO Take by mouth     • cycloSPORINE (RESTASIS) 0.05 % ophthalmic emulsion Administer to both eyes every 12 (twelve) hours       • ezetimibe (ZETIA) 10 mg tablet TAKE 1 TABLET BY MOUTH IN  THE MORNING 90 tablet 3   • Icosapent Ethyl 1 g CAPS TAKE 2 CAPSULES BY MOUTH  TWICE DAILY 360 capsule 3   • lisinopril (ZESTRIL) 20 mg tablet TAKE 1 TABLET BY MOUTH  DAILY 90 tablet 3   • metoprolol succinate (TOPROL-XL) 25 mg 24 hr tablet TAKE 1 TABLET BY MOUTH ONCE DAILY 90 tablet 3   • Misc Natural Products (OSTEO BI-FLEX/5-LOXIN ADVANCED) TABS Take by mouth daily     • multivitamin-iron-minerals-folic acid (CENTRUM) chewable tablet Chew 1 tablet daily     • rosuvastatin (CRESTOR) 20 MG tablet Take 1 tablet (20 mg total) by mouth daily 90 tablet 3   • tamsulosin (FLOMAX) 0.4 mg TAKE 1 CAPSULE BY MOUTH  DAILY AT BEDTIME 90 capsule 3   • TURMERIC PO Take by mouth Last dose 1014/22       No current facility-administered medications for this visit. He has No Known Allergies. .      Review of Systems:  Review of Systems   Respiratory: Negative for shortness of breath. Cardiovascular: Negative for chest pain, palpitations and leg swelling. Musculoskeletal: Positive for arthralgias. All other systems reviewed and are negative. Physical Exam:  /80 (BP Location: Right arm, Patient Position: Sitting, Cuff Size: Large)   Pulse 93   Ht 5' 6" (1.676 m)   Wt 95.7 kg (210 lb 14.4 oz)   SpO2 97%   BMI 34.04 kg/m²     Physical Exam  Constitutional:       General: He is not in acute distress. Appearance: He is not ill-appearing, toxic-appearing or diaphoretic. Eyes:      General: No scleral icterus. Right eye: No discharge. Left eye: No discharge. Conjunctiva/sclera: Conjunctivae normal.   Musculoskeletal:      Right lower leg: No edema. Left lower leg: No edema. Neurological:      General: No focal deficit present. Mental Status: He is alert and oriented to person, place, and time. Mental status is at baseline. Psychiatric:         Mood and Affect: Mood normal.         Behavior: Behavior normal.         Thought Content:  Thought content normal.         Judgment: Judgment normal.         Labs:  Lab Results   Component Value Date     05/12/2017    K 4.7 09/27/2022    K 5.0 04/25/2022     09/27/2022     04/25/2022    CO2 29 09/27/2022    CO2 21 04/25/2022    BUN 12 09/27/2022    BUN 15 04/25/2022    CREATININE 0.88 09/27/2022    CREATININE 0.82 05/12/2017    GLUCOSE 94 05/12/2017    CALCIUM 9.4 09/27/2022    CALCIUM 8.8 05/12/2017     Lab Results   Component Value Date    WBC 7.96 09/27/2022    HGB 14.2 09/27/2022    HCT 43.5 09/27/2022     (H) 09/27/2022     09/27/2022     Lab Results Component Value Date    CHOL 112 05/12/2017    TRIG 93 04/25/2022    HDL 37 (L) 04/25/2022     Imaging: No results found. Discussion/Summary:  1. Coronary artery disease involving native coronary artery of native heart without angina pectoris  -   Continue Toprol XL 25 mg daily. -   Continue aspirin 81 mg daily for secondary prevention.  -   Discussed diet and exercise.     -Patient developed a rate related left bundle branch block with stress testing. Further testing revealed normal ejection fraction and no significant ischemia or infarction. 2. Essential hypertension  -   Blood pressure target is less than 130/80. Continue current medication regimen. 3. Dyslipidemia  -   LDL target is less than 70 mg/dL. Triglycerides less than 150.   -Atorvastatin stopped because of myalgias.   Feels better with Crestor  - Continue Zetia

## 2024-01-11 ENCOUNTER — TELEPHONE (OUTPATIENT)
Age: 74
End: 2024-01-11

## 2024-01-11 NOTE — TELEPHONE ENCOUNTER
Patient called to request a NP appt for scabs on his head and a growth on his back.  I explained wait list and placed on list.  I advised he call his pcp in the meantime

## 2024-02-10 DIAGNOSIS — E78.5 DYSLIPIDEMIA: ICD-10-CM

## 2024-02-10 DIAGNOSIS — I10 ESSENTIAL HYPERTENSION: ICD-10-CM

## 2024-02-14 RX ORDER — ICOSAPENT ETHYL 1000 MG/1
CAPSULE ORAL
Qty: 360 CAPSULE | Refills: 3 | Status: SHIPPED | OUTPATIENT
Start: 2024-02-14

## 2024-02-14 RX ORDER — LISINOPRIL 20 MG/1
TABLET ORAL
Qty: 90 TABLET | Refills: 3 | Status: SHIPPED | OUTPATIENT
Start: 2024-02-14

## 2024-02-14 RX ORDER — EZETIMIBE 10 MG/1
TABLET ORAL
Qty: 90 TABLET | Refills: 3 | Status: SHIPPED | OUTPATIENT
Start: 2024-02-14

## 2024-03-18 DIAGNOSIS — N40.0 BPH WITHOUT URINARY OBSTRUCTION: ICD-10-CM

## 2024-03-18 RX ORDER — TAMSULOSIN HYDROCHLORIDE 0.4 MG/1
0.4 CAPSULE ORAL
Qty: 90 CAPSULE | Refills: 0 | OUTPATIENT
Start: 2024-03-18

## 2024-03-18 NOTE — TELEPHONE ENCOUNTER
Patient called and stated that he tried to contact PCP office and can not get a hold of anyone for a refill on his Tamsulosin.   He was wondering if Dr. Caal could refill this for him, but he is not in the office toay.

## 2024-03-19 DIAGNOSIS — N40.0 BPH WITHOUT URINARY OBSTRUCTION: ICD-10-CM

## 2024-03-19 RX ORDER — TAMSULOSIN HYDROCHLORIDE 0.4 MG/1
0.4 CAPSULE ORAL
Qty: 90 CAPSULE | Refills: 0 | OUTPATIENT
Start: 2024-03-19

## 2024-03-21 ENCOUNTER — TELEPHONE (OUTPATIENT)
Age: 74
End: 2024-03-21

## 2024-03-21 DIAGNOSIS — N40.0 BPH WITHOUT URINARY OBSTRUCTION: ICD-10-CM

## 2024-03-21 NOTE — TELEPHONE ENCOUNTER
Patient was asking why medication is being refused, advised to call to set up an appt that he hasn't been seen. Patient stated he will call office back right away and make appt.     Reason for call:   [x] Refill   [] Prior Auth  [] Other:     Office:   [x] PCP/Provider - Ana Allison MD    [] Specialty/Provider -     Medication:     tamsulosin (FLOMAX) 0.4 mg       Dose/Frequency: TAKE 1 CAPSULE BY MOUTH DAILY AT BEDTIME,     Quantity: 90    Pharmacy: [facility]    Does the patient have enough for 3 days?   [x] Yes   [] No - Send as HP to POD

## 2024-03-21 NOTE — TELEPHONE ENCOUNTER
Pt called to see if this done yet. He said to leave a message on his house phone when it is done. Thank you.

## 2024-03-21 NOTE — TELEPHONE ENCOUNTER
Pt called requesting routine lab work. Pt scheduled an appt for 3/26 so he was hoping to get the lab work done today or tomorrow if possible.  Please advise

## 2024-03-22 ENCOUNTER — RA CDI HCC (OUTPATIENT)
Dept: OTHER | Facility: HOSPITAL | Age: 74
End: 2024-03-22

## 2024-03-22 DIAGNOSIS — R73.03 PREDIABETES: ICD-10-CM

## 2024-03-22 DIAGNOSIS — I10 ESSENTIAL HYPERTENSION: Primary | ICD-10-CM

## 2024-03-22 DIAGNOSIS — E78.5 DYSLIPIDEMIA: ICD-10-CM

## 2024-03-22 RX ORDER — TAMSULOSIN HYDROCHLORIDE 0.4 MG/1
0.4 CAPSULE ORAL
Qty: 90 CAPSULE | Refills: 0 | Status: SHIPPED | OUTPATIENT
Start: 2024-03-22

## 2024-03-22 NOTE — TELEPHONE ENCOUNTER
Patient moved appointment to 3/29 so you would have time to get lab results. Patient is requesting short term supply flomax sent to [facility] in meantime as only has couple left.

## 2024-03-27 LAB
ALBUMIN SERPL-MCNC: 4.6 G/DL (ref 3.8–4.8)
ALBUMIN/GLOB SERPL: 1.8 {RATIO} (ref 1.2–2.2)
ALP SERPL-CCNC: 41 IU/L (ref 44–121)
ALT SERPL-CCNC: 26 IU/L (ref 0–44)
AST SERPL-CCNC: 27 IU/L (ref 0–40)
BILIRUB SERPL-MCNC: 0.7 MG/DL (ref 0–1.2)
BUN SERPL-MCNC: 17 MG/DL (ref 8–27)
BUN/CREAT SERPL: 19 (ref 10–24)
CALCIUM SERPL-MCNC: 9.4 MG/DL (ref 8.6–10.2)
CHLORIDE SERPL-SCNC: 101 MMOL/L (ref 96–106)
CHOLEST SERPL-MCNC: 127 MG/DL (ref 100–199)
CO2 SERPL-SCNC: 26 MMOL/L (ref 20–29)
CREAT SERPL-MCNC: 0.89 MG/DL (ref 0.76–1.27)
EGFR: 92 ML/MIN/1.73
GLOBULIN SER-MCNC: 2.5 G/DL (ref 1.5–4.5)
GLUCOSE SERPL-MCNC: 90 MG/DL (ref 70–99)
HBA1C MFR BLD: 6.2 % (ref 4.8–5.6)
HDLC SERPL-MCNC: 42 MG/DL
LDLC SERPL CALC-MCNC: 65 MG/DL (ref 0–99)
MICRODELETION SYND BLD/T FISH: NORMAL
POTASSIUM SERPL-SCNC: 4.6 MMOL/L (ref 3.5–5.2)
PROT SERPL-MCNC: 7.1 G/DL (ref 6–8.5)
SL AMB VLDL CHOLESTEROL CALC: 20 MG/DL (ref 5–40)
SODIUM SERPL-SCNC: 140 MMOL/L (ref 134–144)
TRIGL SERPL-MCNC: 111 MG/DL (ref 0–149)
TSH SERPL DL<=0.005 MIU/L-ACNC: 4 UIU/ML (ref 0.45–4.5)

## 2024-04-02 ENCOUNTER — OFFICE VISIT (OUTPATIENT)
Dept: FAMILY MEDICINE CLINIC | Facility: CLINIC | Age: 74
End: 2024-04-02
Payer: COMMERCIAL

## 2024-04-02 VITALS
HEIGHT: 66 IN | SYSTOLIC BLOOD PRESSURE: 138 MMHG | BODY MASS INDEX: 34.65 KG/M2 | RESPIRATION RATE: 18 BRPM | DIASTOLIC BLOOD PRESSURE: 64 MMHG | OXYGEN SATURATION: 99 % | HEART RATE: 80 BPM | TEMPERATURE: 98 F | WEIGHT: 215.6 LBS

## 2024-04-02 DIAGNOSIS — Z12.12 ENCOUNTER FOR COLORECTAL CANCER SCREENING: ICD-10-CM

## 2024-04-02 DIAGNOSIS — Z12.11 ENCOUNTER FOR COLORECTAL CANCER SCREENING: ICD-10-CM

## 2024-04-02 DIAGNOSIS — I25.9 ISCHEMIC HEART DISEASE, CHRONIC: ICD-10-CM

## 2024-04-02 DIAGNOSIS — Z00.00 MEDICARE ANNUAL WELLNESS VISIT, SUBSEQUENT: Primary | ICD-10-CM

## 2024-04-02 DIAGNOSIS — I10 ESSENTIAL HYPERTENSION: ICD-10-CM

## 2024-04-02 DIAGNOSIS — R73.03 PREDIABETES: ICD-10-CM

## 2024-04-02 DIAGNOSIS — E66.09 CLASS 1 OBESITY DUE TO EXCESS CALORIES WITH SERIOUS COMORBIDITY AND BODY MASS INDEX (BMI) OF 34.0 TO 34.9 IN ADULT: ICD-10-CM

## 2024-04-02 PROCEDURE — G0439 PPPS, SUBSEQ VISIT: HCPCS | Performed by: FAMILY MEDICINE

## 2024-04-02 NOTE — PROGRESS NOTES
Assessment and Plan:     Problem List Items Addressed This Visit        Cardiovascular and Mediastinum    Essential hypertension    Ischemic heart disease, chronic       Other    Class 1 obesity due to excess calories with serious comorbidity and body mass index (BMI) of 34.0 to 34.9 in adult    Prediabetes   Other Visit Diagnoses     Medicare annual wellness visit, subsequent    -  Primary    Encounter for colorectal cancer screening        Relevant Orders    Ambulatory Referral to Gastroenterology             Preventive health issues were discussed with patient, and age appropriate screening tests were ordered as noted in patient's After Visit Summary.  Personalized health advice and appropriate referrals for health education or preventive services given if needed, as noted in patient's After Visit Summary.     History of Present Illness:     Patient presents for a Medicare Wellness Visit    HPI   Patient Care Team:  Ana Allison MD as PCP - General  MD Kathy Dominguez DO     Review of Systems:     Review of Systems     Problem List:     Patient Active Problem List   Diagnosis   • Essential hypertension   • CAD in native artery   • Class 1 obesity due to excess calories with serious comorbidity and body mass index (BMI) of 34.0 to 34.9 in adult   • Enlarged prostate without lower urinary tract symptoms (luts)   • Ischemic heart disease, chronic   • Dyslipidemia   • S/P right coronary artery (RCA) stent placement   • Carpal tunnel syndrome on right   • Class 2 obesity without serious comorbidity with body mass index (BMI) of 35.0 to 35.9 in adult   • Prediabetes   • Sensorineural hearing loss (SNHL), bilateral   • Bilateral tinnitus      Past Medical and Surgical History:     Past Medical History:   Diagnosis Date   • Abnormal electrocardiogram     last assessed 12/19/13   • Arthritis     hand and forearm   • Chest pain    • Coronary artery disease    • Enthesopathy of elbow 02/15/2006   •  Epididymo-orchitis 07/10/2012   • Hyperlipidemia    • Hypertension    • Obesity    • Voice disturbance 05/27/2008     Past Surgical History:   Procedure Laterality Date   • ANGIOPLASTY  2013    one stent RCA   • APPENDECTOMY     • CARPAL TUNNEL RELEASE Left 2018   • CORONARY ANGIOPLASTY     • CORONARY ANGIOPLASTY WITH STENT PLACEMENT     • CORONARY ANGIOPLASTY WITH STENT PLACEMENT  2013   • MN NEUROPLASTY &/TRANSPOS MEDIAN NRV CARPAL TUNNE Left 8/13/2018    Procedure: RELEASE CARPAL TUNNEL;  Surgeon: Gustavo Trejo MD;  Location: [facility];  Service: Orthopedics   • MN NEUROPLASTY &/TRANSPOS MEDIAN NRV CARPAL TUNNE Right 9/19/2019    Procedure: RELEASE CARPAL TUNNEL;  Surgeon: Gustavo Trejo MD;  Location: [facility];  Service: Orthopedics   • MN TENDON SHEATH INCISION Left 10/20/2022    Procedure: RELEASE TRIGGER FINGER LONG FINGER;  Surgeon: Gustavo Trejo MD;  Location: [facility];  Service: Orthopedics      Family History:     Family History   Problem Relation Age of Onset   • Heart disease Mother    • Hypertension Mother    • Heart disease Father         CHF   • Arthritis Brother    • Obesity Brother    • Hypertension Brother    • No Known Problems Maternal Aunt    • No Known Problems Maternal Uncle    • No Known Problems Paternal Aunt    • No Known Problems Paternal Uncle    • No Known Problems Maternal Grandmother    • No Known Problems Maternal Grandfather    • No Known Problems Paternal Grandmother    • No Known Problems Paternal Grandfather       Social History:     Social History     Socioeconomic History   • Marital status: /Civil Union     Spouse name: None   • Number of children: None   • Years of education: None   • Highest education level: None   Occupational History   • None   Tobacco Use   • Smoking status: Never   • Smokeless tobacco: Never   Vaping Use   • Vaping status: Never Used   Substance and Sexual Activity   • Alcohol use: No   • Drug use: No   • Sexual activity:  None   Other Topics Concern   • None   Social History Narrative   • None     Social Determinants of Health     Financial Resource Strain: Not on file   Food Insecurity: No Food Insecurity (4/2/2024)    Hunger Vital Sign    • Worried About Running Out of Food in the Last Year: Never true    • Ran Out of Food in the Last Year: Never true   Transportation Needs: No Transportation Needs (4/2/2024)    PRAPARE - Transportation    • Lack of Transportation (Medical): No    • Lack of Transportation (Non-Medical): No   Physical Activity: Not on file   Stress: Not on file   Social Connections: Not on file   Intimate Partner Violence: Not on file   Housing Stability: Unknown (4/2/2024)    Housing Stability Vital Sign    • Unable to Pay for Housing in the Last Year: No    • Number of Places Lived in the Last Year: Not on file    • Unstable Housing in the Last Year: No      Medications and Allergies:     Current Outpatient Medications   Medication Sig Dispense Refill   • aspirin 81 MG tablet Take 1 tablet by mouth daily Last dose is 10/14/22     • Biotin 40401 MCG TABS Take 5,000 mg by mouth daily     • calcium citrate-vitamin D (CITRACAL+D) 315-200 MG-UNIT per tablet Take 1 tablet by mouth 2 (two) times a day     • Cholecalciferol (VITAMIN D3) 2000 units capsule Take 2,000 Units by mouth daily       • Coenzyme Q10 (CO Q-10) 400 MG CAPS Take 1 capsule by mouth Daily     • CRANBERRY JUICE EXTRACT PO Take by mouth     • cycloSPORINE (RESTASIS) 0.05 % ophthalmic emulsion Administer to both eyes every 12 (twelve) hours       • ezetimibe (ZETIA) 10 mg tablet TAKE 1 TABLET BY MOUTH IN THE  MORNING 90 tablet 3   • lisinopril (ZESTRIL) 20 mg tablet TAKE 1 TABLET BY MOUTH DAILY 90 tablet 3   • metoprolol succinate (TOPROL-XL) 25 mg 24 hr tablet TAKE 1 TABLET BY MOUTH ONCE DAILY 90 tablet 3   • Misc Natural Products (OSTEO BI-FLEX/5-LOXIN ADVANCED) TABS Take by mouth daily     • multivitamin-iron-minerals-folic acid (CENTRUM) chewable  tablet Chew 1 tablet daily     • rosuvastatin (CRESTOR) 20 MG tablet Take 1 tablet (20 mg total) by mouth daily 90 tablet 3   • tamsulosin (FLOMAX) 0.4 mg Take 1 capsule (0.4 mg total) by mouth daily at bedtime 90 capsule 0   • TURMERIC PO Take by mouth Last dose 1014/22     • Icosapent Ethyl 1 g CAPS TAKE 2 CAPSULES BY MOUTH TWICE  DAILY 360 capsule 3     No current facility-administered medications for this visit.     No Known Allergies   Immunizations:     Immunization History   Administered Date(s) Administered   • COVID-19 MODERNA VACC 0.25 ML IM BOOSTER 01/28/2022   • COVID-19 MODERNA VACC 0.5 ML IM 02/11/2021, 03/11/2021   • Influenza Quadrivalent Preservative Free 3 years and older IM 11/09/2015, 10/19/2016   • Influenza, high dose seasonal 0.7 mL 11/04/2019, 12/14/2020, 12/01/2021, 01/27/2023   • Influenza, injectable, quadrivalent, preservative free 0.5 mL 11/05/2018   • Influenza, seasonal, injectable 11/21/2014, 11/03/2017   • Pneumococcal Conjugate 13-Valent 06/05/2019   • Pneumococcal Polysaccharide PPV23 05/04/2022   • Tdap 08/10/2007, 11/20/2017      Health Maintenance:         Topic Date Due   • Hepatitis C Screening  Never done   • Colorectal Cancer Screening  Never done         Topic Date Due   • Influenza Vaccine (1) 09/01/2023   • COVID-19 Vaccine (4 - 2023-24 season) 09/01/2023      Medicare Screening Tests and Risk Assessments:     Alden is here for his Subsequent Wellness visit.     Health Risk Assessment:   Patient rates overall health as very good. Patient feels that their physical health rating is slightly better. Patient is very satisfied with their life. Eyesight was rated as same. Hearing was rated as same. Patient feels that their emotional and mental health rating is much better. Patients states they are never, rarely angry. Patient states they are never, rarely unusually tired/fatigued. Pain experienced in the last 7 days has been some. Patient's pain rating has been 7/10. Patient  states that he has experienced no weight loss or gain in last 6 months. Small joints both hands pain     Depression Screening:   PHQ-2 Score: 0      Fall Risk Screening:   In the past year, patient has experienced: no history of falling in past year      Home Safety:  Patient does not have trouble with stairs inside or outside of their home. Patient has working smoke alarms and has working carbon monoxide detector. Home safety hazards include: none.     Nutrition:   Current diet is Regular.     Medications:   Patient is currently taking over-the-counter supplements. OTC medications include: see medication list. Patient is able to manage medications.     Activities of Daily Living (ADLs)/Instrumental Activities of Daily Living (IADLs):   Walk and transfer into and out of bed and chair?: Yes  Dress and groom yourself?: Yes    Bathe or shower yourself?: Yes    Feed yourself? Yes  Do your laundry/housekeeping?: Yes  Manage your money, pay your bills and track your expenses?: Yes  Make your own meals?: Yes    Do your own shopping?: Yes    Previous Hospitalizations:   Any hospitalizations or ED visits within the last 12 months?: No      Advance Care Planning:   Living will: Yes    Durable POA for healthcare: Yes      Cognitive Screening:   Provider or family/friend/caregiver concerned regarding cognition?: No    PREVENTIVE SCREENINGS      Cardiovascular Screening:    General: Screening Current      Diabetes Screening:     General: Screening Current      Colorectal Cancer Screening:       Due for: Colonoscopy - Low Risk      Prostate Cancer Screening:    General: Screening Not Indicated      Osteoporosis Screening:    General: Screening Not Indicated      Abdominal Aortic Aneurysm (AAA) Screening:    Risk factors include: age between 65-76 yo        General: Screening Not Indicated      Lung Cancer Screening:     General: Screening Not Indicated      Hepatitis C Screening:    General: Screening Not Indicated    Screening,  "Brief Intervention, and Referral to Treatment (SBIRT)    Screening  Typical number of drinks in a day: 0  Typical number of drinks in a week: 0  Interpretation: Low risk drinking behavior.    Single Item Drug Screening:  How often have you used an illegal drug (including marijuana) or a prescription medication for non-medical reasons in the past year? never    Single Item Drug Screen Score: 0  Interpretation: Negative screen for possible drug use disorder    No results found.     Physical Exam:     /64 (BP Location: Left arm, Patient Position: Sitting, Cuff Size: Large)   Pulse 80   Temp 98 °F (36.7 °C) (Temporal)   Resp 18   Ht 5' 6\" (1.676 m)   Wt 97.8 kg (215 lb 9.6 oz)   SpO2 99%   BMI 34.80 kg/m²     Physical Exam     Ana Allison MD  "

## 2024-04-02 NOTE — PATIENT INSTRUCTIONS
Medicare Preventive Visit Patient Instructions  Thank you for completing your Welcome to Medicare Visit or Medicare Annual Wellness Visit today. Your next wellness visit will be due in one year (4/3/2025).  The screening/preventive services that you may require over the next 5-10 years are detailed below. Some tests may not apply to you based off risk factors and/or age. Screening tests ordered at today's visit but not completed yet may show as past due. Also, please note that scanned in results may not display below.  Preventive Screenings:  Service Recommendations Previous Testing/Comments   Colorectal Cancer Screening  Colonoscopy    Fecal Occult Blood Test (FOBT)/Fecal Immunochemical Test (FIT)  Fecal DNA/Cologuard Test  Flexible Sigmoidoscopy Age: 45-75 years old   Colonoscopy: every 10 years (May be performed more frequently if at higher risk)  OR  FOBT/FIT: every 1 year  OR  Cologuard: every 3 years  OR  Sigmoidoscopy: every 5 years  Screening may be recommended earlier than age 45 if at higher risk for colorectal cancer. Also, an individualized decision between you and your healthcare provider will decide whether screening between the ages of 76-85 would be appropriate. Colonoscopy: Not on file  FOBT/FIT: Not on file  Cologuard: Not on file  Sigmoidoscopy: Not on file          Prostate Cancer Screening Individualized decision between patient and health care provider in men between ages of 55-69   Medicare will cover every 12 months beginning on the day after your 50th birthday PSA: No results in last 5 years           Hepatitis C Screening Once for adults born between 1945 and 1965  More frequently in patients at high risk for Hepatitis C Hep C Antibody: Not on file        Diabetes Screening 1-2 times per year if you're at risk for diabetes or have pre-diabetes Fasting glucose: 106 mg/dL (9/27/2022)  A1C: 6.2 % (3/26/2024)      Cholesterol Screening Once every 5 years if you don't have a lipid disorder. May  order more often based on risk factors. Lipid panel: 03/26/2024         Other Preventive Screenings Covered by Medicare:  Abdominal Aortic Aneurysm (AAA) Screening: covered once if your at risk. You're considered to be at risk if you have a family history of AAA or a male between the age of 65-75 who smoking at least 100 cigarettes in your lifetime.  Lung Cancer Screening: covers low dose CT scan once per year if you meet all of the following conditions: (1) Age 55-77; (2) No signs or symptoms of lung cancer; (3) Current smoker or have quit smoking within the last 15 years; (4) You have a tobacco smoking history of at least 20 pack years (packs per day x number of years you smoked); (5) You get a written order from a healthcare provider.  Glaucoma Screening: covered annually if you're considered high risk: (1) You have diabetes OR (2) Family history of glaucoma OR (3)  aged 50 and older OR (4)  American aged 65 and older  Osteoporosis Screening: covered every 2 years if you meet one of the following conditions: (1) Have a vertebral abnormality; (2) On glucocorticoid therapy for more than 3 months; (3) Have primary hyperparathyroidism; (4) On osteoporosis medications and need to assess response to drug therapy.  HIV Screening: covered annually if you're between the age of 15-65. Also covered annually if you are younger than 15 and older than 65 with risk factors for HIV infection. For pregnant patients, it is covered up to 3 times per pregnancy.    Immunizations:  Immunization Recommendations   Influenza Vaccine Annual influenza vaccination during flu season is recommended for all persons aged >= 6 months who do not have contraindications   Pneumococcal Vaccine   * Pneumococcal conjugate vaccine = PCV13 (Prevnar 13), PCV15 (Vaxneuvance), PCV20 (Prevnar 20)  * Pneumococcal polysaccharide vaccine = PPSV23 (Pneumovax) Adults 19-65 yo with certain risk factors or if 65+ yo  If never received any  pneumonia vaccine: recommend Prevnar 20 (PCV20)  Give PCV20 if previously received 1 dose of PCV13 or PPSV23   Hepatitis B Vaccine 3 dose series if at intermediate or high risk (ex: diabetes, end stage renal disease, liver disease)   Respiratory syncytial virus (RSV) Vaccine - COVERED BY MEDICARE PART D  * RSVPreF3 (Arexvy) CDC recommends that adults 60 years of age and older may receive a single dose of RSV vaccine using shared clinical decision-making (SCDM)   Tetanus (Td) Vaccine - COST NOT COVERED BY MEDICARE PART B Following completion of primary series, a booster dose should be given every 10 years to maintain immunity against tetanus. Td may also be given as tetanus wound prophylaxis.   Tdap Vaccine - COST NOT COVERED BY MEDICARE PART B Recommended at least once for all adults. For pregnant patients, recommended with each pregnancy.   Shingles Vaccine (Shingrix) - COST NOT COVERED BY MEDICARE PART B  2 shot series recommended in those 19 years and older who have or will have weakened immune systems or those 50 years and older     Health Maintenance Due:      Topic Date Due    Hepatitis C Screening  Never done    Colorectal Cancer Screening  Never done     Immunizations Due:      Topic Date Due    Influenza Vaccine (1) 09/01/2023    COVID-19 Vaccine (4 - 2023-24 season) 09/01/2023     Advance Directives   What are advance directives?  Advance directives are legal documents that state your wishes and plans for medical care. These plans are made ahead of time in case you lose your ability to make decisions for yourself. Advance directives can apply to any medical decision, such as the treatments you want, and if you want to donate organs.   What are the types of advance directives?  There are many types of advance directives, and each state has rules about how to use them. You may choose a combination of any of the following:  Living will:  This is a written record of the treatment you want. You can also choose  which treatments you do not want, which to limit, and which to stop at a certain time. This includes surgery, medicine, IV fluid, and tube feedings.   Durable power of  for healthcare (DPAHC):  This is a written record that states who you want to make healthcare choices for you when you are unable to make them for yourself. This person, called a proxy, is usually a family member or a friend. You may choose more than 1 proxy.  Do not resuscitate (DNR) order:  A DNR order is used in case your heart stops beating or you stop breathing. It is a request not to have certain forms of treatment, such as CPR. A DNR order may be included in other types of advance directives.  Medical directive:  This covers the care that you want if you are in a coma, near death, or unable to make decisions for yourself. You can list the treatments you want for each condition. Treatment may include pain medicine, surgery, blood transfusions, dialysis, IV or tube feedings, and a ventilator (breathing machine).  Values history:  This document has questions about your views, beliefs, and how you feel and think about life. This information can help others choose the care that you would choose.  Why are advance directives important?  An advance directive helps you control your care. Although spoken wishes may be used, it is better to have your wishes written down. Spoken wishes can be misunderstood, or not followed. Treatments may be given even if you do not want them. An advance directive may make it easier for your family to make difficult choices about your care.   Weight Management   Why it is important to manage your weight:  Being overweight increases your risk of health conditions such as heart disease, high blood pressure, type 2 diabetes, and certain types of cancer. It can also increase your risk for osteoarthritis, sleep apnea, and other respiratory problems. Aim for a slow, steady weight loss. Even a small amount of weight loss  can lower your risk of health problems.  How to lose weight safely:  A safe and healthy way to lose weight is to eat fewer calories and get regular exercise. You can lose up about 1 pound a week by decreasing the number of calories you eat by 500 calories each day.   Healthy meal plan for weight management:  A healthy meal plan includes a variety of foods, contains fewer calories, and helps you stay healthy. A healthy meal plan includes the following:  Eat whole-grain foods more often.  A healthy meal plan should contain fiber. Fiber is the part of grains, fruits, and vegetables that is not broken down by your body. Whole-grain foods are healthy and provide extra fiber in your diet. Some examples of whole-grain foods are whole-wheat breads and pastas, oatmeal, brown rice, and bulgur.  Eat a variety of vegetables every day.  Include dark, leafy greens such as spinach, kale, shivani greens, and mustard greens. Eat yellow and orange vegetables such as carrots, sweet potatoes, and winter squash.   Eat a variety of fruits every day.  Choose fresh or canned fruit (canned in its own juice or light syrup) instead of juice. Fruit juice has very little or no fiber.  Eat low-fat dairy foods.  Drink fat-free (skim) milk or 1% milk. Eat fat-free yogurt and low-fat cottage cheese. Try low-fat cheeses such as mozzarella and other reduced-fat cheeses.  Choose meat and other protein foods that are low in fat.  Choose beans or other legumes such as split peas or lentils. Choose fish, skinless poultry (chicken or turkey), or lean cuts of red meat (beef or pork). Before you cook meat or poultry, cut off any visible fat.   Use less fat and oil.  Try baking foods instead of frying them. Add less fat, such as margarine, sour cream, regular salad dressing and mayonnaise to foods. Eat fewer high-fat foods. Some examples of high-fat foods include french fries, doughnuts, ice cream, and cakes.  Eat fewer sweets.  Limit foods and drinks that  are high in sugar. This includes candy, cookies, regular soda, and sweetened drinks.  Exercise:  Exercise at least 30 minutes per day on most days of the week. Some examples of exercise include walking, biking, dancing, and swimming. You can also fit in more physical activity by taking the stairs instead of the elevator or parking farther away from stores. Ask your healthcare provider about the best exercise plan for you.      © Copyright [employer] 2018 Information is for End User's use only and may not be sold, redistributed or otherwise used for commercial purposes. All illustrations and images included in CareNotes® are the copyrighted property of [employer] or "[employer]  Recent Results (from the past 672 hour(s))   Comprehensive metabolic panel    Collection Time: 03/26/24  8:42 AM   Result Value Ref Range    Glucose, Random 90 70 - 99 mg/dL    BUN 17 8 - 27 mg/dL    Creatinine 0.89 0.76 - 1.27 mg/dL    eGFR 92 >59 mL/min/1.73    SL AMB BUN/CREATININE RATIO 19 10 - 24    Sodium 140 134 - 144 mmol/L    Potassium 4.6 3.5 - 5.2 mmol/L    Chloride 101 96 - 106 mmol/L    CO2 26 20 - 29 mmol/L    CALCIUM 9.4 8.6 - 10.2 mg/dL    Protein, Total 7.1 6.0 - 8.5 g/dL    Albumin 4.6 3.8 - 4.8 g/dL    Globulin, Total 2.5 1.5 - 4.5 g/dL    Albumin/Globulin Ratio 1.8 1.2 - 2.2    TOTAL BILIRUBIN 0.7 0.0 - 1.2 mg/dL    Alk Phos Isoenzymes 41 (L) 44 - 121 IU/L    AST 27 0 - 40 IU/L    ALT 26 0 - 44 IU/L   Lipid panel    Collection Time: 03/26/24  8:42 AM   Result Value Ref Range    Cholesterol, Total 127 100 - 199 mg/dL    Triglycerides 111 0 - 149 mg/dL    HDL 42 >39 mg/dL    VLDL Cholesterol Calculated 20 5 - 40 mg/dL    LDL Calculated 65 0 - 99 mg/dL   Hemoglobin A1c (w/out EAG)    Collection Time: 03/26/24  8:42 AM   Result Value Ref Range    Hemoglobin A1C 6.2 (H) 4.8 - 5.6 %   TSH, 3rd generation    Collection Time: 03/26/24  8:42 AM   Result Value Ref Range    TSH 4.000 0.450 - 4.500 uIU/mL    Cardiovascular Report    Collection Time: 03/26/24  8:42 AM   Result Value Ref Range    Interpretation Note        Topical Clindamycin Pregnancy And Lactation Text: This medication is Pregnancy Category B and is considered safe during pregnancy. It is unknown if it is excreted in breast milk.

## 2024-05-05 DIAGNOSIS — I25.10 CAD IN NATIVE ARTERY: ICD-10-CM

## 2024-05-06 RX ORDER — ROSUVASTATIN CALCIUM 20 MG/1
20 TABLET, COATED ORAL DAILY
Qty: 90 TABLET | Refills: 1 | Status: SHIPPED | OUTPATIENT
Start: 2024-05-06

## 2024-05-17 ENCOUNTER — OFFICE VISIT (OUTPATIENT)
Dept: DERMATOLOGY | Facility: CLINIC | Age: 74
End: 2024-05-17

## 2024-05-17 VITALS — TEMPERATURE: 97.6 F | WEIGHT: 213.2 LBS | BODY MASS INDEX: 34.27 KG/M2 | HEIGHT: 66 IN

## 2024-05-17 DIAGNOSIS — L57.0 KERATOSIS, ACTINIC: ICD-10-CM

## 2024-05-17 DIAGNOSIS — L82.1 SEBORRHEIC KERATOSES: ICD-10-CM

## 2024-05-17 DIAGNOSIS — D18.01 CHERRY ANGIOMA: ICD-10-CM

## 2024-05-17 DIAGNOSIS — D48.9 NEOPLASM OF UNCERTAIN BEHAVIOR: Primary | ICD-10-CM

## 2024-05-17 PROCEDURE — 88342 IMHCHEM/IMCYTCHM 1ST ANTB: CPT | Performed by: STUDENT IN AN ORGANIZED HEALTH CARE EDUCATION/TRAINING PROGRAM

## 2024-05-17 PROCEDURE — 88307 TISSUE EXAM BY PATHOLOGIST: CPT | Performed by: STUDENT IN AN ORGANIZED HEALTH CARE EDUCATION/TRAINING PROGRAM

## 2024-05-17 PROCEDURE — 88341 IMHCHEM/IMCYTCHM EA ADD ANTB: CPT | Performed by: STUDENT IN AN ORGANIZED HEALTH CARE EDUCATION/TRAINING PROGRAM

## 2024-05-17 NOTE — PATIENT INSTRUCTIONS
"    NEOPLASM OF UNCERTAIN BEHAVIOR OF SKIN      Assessment and Plan:  I have discussed with the patient that a sample of skin via a \"skin biopsy” would be potentially helpful to further make a specific diagnosis under the microscope.  Based on a thorough discussion of this condition and the management approach to it (including a comprehensive discussion of the known risks, side effects and potential benefits of treatment), the patient (family) agrees to implement the following specific plan:    Procedure:  Skin Biopsy. WILL CALL WITH RESULTS   After a thorough discussion of treatment options and risk/benefits/alternatives (including but not limited to local pain, scarring, dyspigmentation, blistering, possible superinfection, and inability to confirm a diagnosis via histopathology), verbal and written consent were obtained and portion of the rash was biopsied for tissue sample.  See below for consent that was obtained from patient and subsequent Procedure Note.   PROCEDURE TANGENTIAL (SHAVE) BIOPSY NOTE:        INFORMED CONSENT DISCUSSION AND POST-OPERATIVE INSTRUCTIONS FOR PATIENT    I.  RATIONALE FOR PROCEDURE  I understand that a skin biopsy allows the Dermatologist to test a lesion or rash under the microscope to obtain a diagnosis.  It usually involves numbing the area with numbing medication and removing a small piece of skin; sometimes the area will be closed with sutures. In this specific procedure, sutures are not usually needed.  If any sutures are placed, then they are usually need to be removed in 2 weeks or less.    I understand that my Dermatologist recommends that a skin \"shave\" biopsy be performed today.  A local anesthetic, similar to the kind that a dentist uses when filling a cavity, will be injected with a very small needle into the skin area to be sampled.  The injected skin and tissue underneath \"will go to sleep” and become numb so no pain should be felt afterwards.  An instrument shaped like " "a tiny \"razor blade\" (shave biopsy instrument) will be used to cut a small piece of tissue and skin from the area so that a sample of tissue can be taken and examined more closely under the microscope.  A slight amount of bleeding will occur, but it will be stopped with direct pressure and a pressure bandage and any other appropriate methods.  I understands that a scar will form where the wound was created.  Surgical ointment will be applied to help protect the wound.  Sutures are not usually needed.    II.  RISKS AND POTENTIAL COMPLICATIONS   I understand the risks and potential complications of a skin biopsy include but are not limited to the following:  Bleeding  Infection  Pain  Scar/keloid  Skin discoloration  Incomplete Removal  Recurrence  Nerve Damage/Numbness/Loss of Function  Allergic Reaction to Anesthesia  Biopsies are diagnostic procedures and based on findings additional treatment or evaluation may be required  Loss or destruction of specimen resulting in no additional findings    My Dermatologist has explained to me the nature of the condition, the nature of the procedure, and the benefits to be reasonably expected compared with alternative approaches.  My Dermatologist has discussed the likelihood of major risks or complications of this procedure including the specific risks listed above, such as bleeding, infection, and scarring/keloid.  I understand that a scar is expected after this procedure.  I understand that my physician cannot predict if the scar will form a \"keloid,\" which extends beyond the borders of the wound that is created.  A keloid is a thick, painful, and bumpy scar.  A keloid can be difficult to treat, as it does not always respond well to therapy, which includes injecting cortisone directly into the keloid every few weeks.  While this usually reduces the pain and size of the scar, it does not eliminate it.      I understand that photographs may be taken before and after the " "procedure.  These will be maintained as part of the medical providers confidential records and may not be made available to me.  I further authorize the medical provider to use the photographs for teaching purposes or to illustrate scientific papers, books, or lectures if in his/her judgment, medical research, education, or science may benefit from its use.    I have had an opportunity to fully inquire about the risks and benefits of this procedure and its alternatives.   I have been given ample time and opportunity to ask questions and to seek a second opinion if I wished to do so.  I acknowledge that there have specifically been no guarantees as to the cosmetic results from the procedure.  I am aware that with any procedure there is always the possibility of an unexpected complication.    III. POST-PROCEDURAL CARE (WHAT YOU WILL NEED TO DO \"AFTER THE BIOPSY\" TO OPTIMIZE HEALING)    Keep the area clean and dry.  Try NOT to remove the bandage or get it wet for the first 24 hours.    Gently clean the area and apply surgical ointment (such as Vaseline petrolatum ointment, which is available \"over the counter\" and not a prescription) to the biopsy site for up to 2 weeks straight.  This acts to protect the wound from the outside world.      Sutures are not usually placed in this procedure.  If any sutures were placed, return for suture removal as instructed (generally 1 week for the face, 2 weeks for the body).      Take Acetaminophen (Tylenol) for discomfort, if no contraindications.  Ibuprofen or aspirin could make bleeding worse.    Call our office immediately for signs of infection: fever, chills, increased redness, warmth, tenderness, discomfort/pain, or pus or foul smell coming from the wound.    WHAT TO DO IF THERE IS ANY BLEEDING?  If a small amount of bleeding is noticed, place a clean cloth over the area and apply firm pressure for ten minutes.  Check the wound after 10 minutes of direct pressure.  If bleeding " "persists, try one more time for an additional 10 minutes of direct pressure on the area.  If the bleeding becomes heavier or does not stop after the second attempt, or if you have any other questions about this procedure, then please call your [facility] Dermatologist by calling [phone] (SKIN).     I hereby acknowledge that I have reviewed and verified the site with my Dermatologist and have requested and authorized my Dermatologist to proceed with the procedure.        ACTINIC KERATOSIS    Plan:  PRESCRIPTION MANAGEMENT:  Several topical management options and their side effects were discussed including \"field therapies\" such as Efudex (5-fluorouracil) and/or Aldara (imiquimod). Efudex may be used alone or in combination with Calcipotriene. Begin treatment once regions that have been sprayed with liquid nitrogen are healed. Redness and irritation are to be expected within a few days of field therapy treatment and should resolve within 1 to 2 weeks following treatment. Do NOT cover the treatment areas with bandages. Use a sunscreen with an SPF 50+ while using field therapy.  We discussed the pre-cancerous nature of the condition. Actinic keratosis is found on sun-damaged skin and there is small risk that the condition could turn into a skin cancer called squamous cell carcinoma. There is no risk of actinic keratosis turning into melanoma.  Proliferative actinic keratosis tends to be resistant against standard treatments, including topical therapies and cryotherapy. It has a tendency to develop into infiltrative squamous cell carcinoma. Excision may be considered.  We discussed sun protection measures, including using sunscreen with an SPF 50+ year round, avoiding the sun, and wearing protective clothing such as long sleeves and pants when out in the sun.  Continue to monitor clinically for signs of recurrence. Discussed with patient the importance of keeping up to date with full body skin exams.  Cryotherapy " performed in the office (See Procedure Note). We discussed that a hypopigmentation or scar may form in the region following cryotherapy.  Follow up in 6 months for repeat treatment or topical chemotherapy    PROCEDURES PERFORMED TODAY ASSOCIATED WITH THIS CONDITION:          Cryotherapy: PROCEDURE:  DESTRUCTION OF PRE-MALIGNANT LESIONS  After a thorough discussion of treatment options and risk/benefits/alternatives (including but not limited to local pain, scarring, dyspigmentation, blistering, and possible superinfection), verbal and written consent were obtained and the aforementioned lesions were treated on with cryotherapy using liquid nitrogen x 1 cycle for 5-10 seconds.    TOTAL NUMBER of 11 pre-malignant lesions were treated today on the ANATOMIC LOCATION: Scalp,cheeks, forehead.     The patient tolerated the procedure well, and after-care instructions were provided.         MEDICAL DECISION MAKING  Treatment Goal:  Resolution of this ACUTE, UNCOMPLICATED condition.       A recent or new short-term problem for which treatment is CONSIDERED OR INITIATED. There is little to no risk of mortality with treatment, and full recovery without functional impairment is expected.  Diagnosis or treatment significantly limited by the following social determinants of health:  NONE IDENTIFIED           SHAIKH ANGIOMAS       Assessment and Plan:  Based on a thorough discussion of this condition and the management approach to it (including a comprehensive discussion of the known risks, side effects and potential benefits of treatment), the patient (family) agrees to implement the following specific plan:  Reassure benign        SEBORRHEIC KERATOSIS; NON-INFLAMED          Assessment and Plan:  Based on a thorough discussion of this condition and the management approach to it (including a comprehensive discussion of the known risks, side effects and potential benefits of treatment), the patient (family) agrees to implement the  "following specific plan:  Reassure benign  Use sun protection.  Apply SPF 30 or higher at least three times a day.  Wear sun protecting clothing and hats.        SOLAR LENTIGINES   OTHER SKIN CHANGES DUE TO CHRONIC EXPOSURE TO NONIONIZING RADIATION        Assessment and Plan:  Based on a thorough discussion of this condition and the management approach to it (including a comprehensive discussion of the known risks, side effects and potential benefits of treatment), the patient (family) agrees to implement the following specific plan:  Reassure benign  Use sun protection.  Apply SPF 30 or higher at least three times a day.  Wear sun protecting clothing and hats.         MULTIPLE MELANOCYTIC NEVI (\"Moles\")     Assessment and Plan:  Based on a thorough discussion of this condition and the management approach to it (including a comprehensive discussion of the known risks, side effects and potential benefits of treatment), the patient (family) agrees to implement the following specific plan:  Reassure benign  Monitor for changes  Use sun protection.  Apply SPF 30 or higher at least three times a day.  Wear sun protecting clothing and hats.       Worrisome signs of skin malignancy discussed, questions answered. Regular self-skin check discussed. Advised to call or return to office if patient notices any spots of concern, rapidly growing/changing lesions, bleeding lesions, non-healing lesions. Advised regular SPF use.    "

## 2024-05-17 NOTE — PROGRESS NOTES
"[facility] Dermatology Clinic Note     Patient Name: Alden Brooke  Encounter Date: 5/17/2024     Have you been cared for by a [facility] Dermatologist in the last 3 years and, if so, which description applies to you?    NO.   I am considered a \"new\" patient and must complete all patient intake questions. I am MALE/not capable of bearing children.    REVIEW OF SYSTEMS:  Have you recently had or currently have any of the following? Recent fever or chills? No  Any non-healing wound? No   PAST MEDICAL HISTORY:  Have you personally ever had or currently have any of the following?  If \"YES,\" then please provide more detail. Skin cancer (such as Melanoma, Basal Cell Carcinoma, Squamous Cell Carcinoma?  No  Tuberculosis, HIV/AIDS, Hepatitis B or C: No  Radiation Treatment No   HISTORY OF IMMUNOSUPPRESSION:   Do you have a history of any of the following:  Systemic Immunosuppression such as Diabetes, Biologic or Immunotherapy, Chemotherapy, Organ Transplantation, Bone Marrow Transplantation?  No     Answering \"YES\" requires the addition of the dotphrase \"IMMUNOSUPPRESSED\" as the first diagnosis of the patient's visit.   FAMILY HISTORY:  Any \"first degree relatives\" (parent, brother, sister, or child) with the following?    Skin Cancer, Pancreatic or Other Cancer? No   PATIENT EXPERIENCE:    Do you want the Dermatologist to perform a COMPLETE skin exam today including a clinical examination under the \"bra and underwear\" areas?  Yes  If necessary, do we have your permission to call and leave a detailed message on your Preferred Phone number that includes your specific medical information?  Yes      No Known Allergies   Current Outpatient Medications:     aspirin 81 MG tablet, Take 1 tablet by mouth daily Last dose is 10/14/22, Disp: , Rfl:     Biotin 45103 MCG TABS, Take 5,000 mg by mouth daily, Disp: , Rfl:     calcium citrate-vitamin D (CITRACAL+D) 315-200 MG-UNIT per tablet, Take 1 tablet by mouth 2 (two) times a " day, Disp: , Rfl:     Cholecalciferol (VITAMIN D3) 2000 units capsule, Take 2,000 Units by mouth daily  , Disp: , Rfl:     Coenzyme Q10 (CO Q-10) 400 MG CAPS, Take 1 capsule by mouth Daily, Disp: , Rfl:     CRANBERRY JUICE EXTRACT PO, Take by mouth, Disp: , Rfl:     cycloSPORINE (RESTASIS) 0.05 % ophthalmic emulsion, Administer to both eyes every 12 (twelve) hours  , Disp: , Rfl:     ezetimibe (ZETIA) 10 mg tablet, TAKE 1 TABLET BY MOUTH IN THE  MORNING, Disp: 90 tablet, Rfl: 3    Icosapent Ethyl 1 g CAPS, TAKE 2 CAPSULES BY MOUTH TWICE  DAILY, Disp: 360 capsule, Rfl: 3    lisinopril (ZESTRIL) 20 mg tablet, TAKE 1 TABLET BY MOUTH DAILY, Disp: 90 tablet, Rfl: 3    metoprolol succinate (TOPROL-XL) 25 mg 24 hr tablet, TAKE 1 TABLET BY MOUTH ONCE DAILY, Disp: 90 tablet, Rfl: 3    Misc Natural Products (OSTEO BI-FLEX/5-LOXIN ADVANCED) TABS, Take by mouth daily, Disp: , Rfl:     multivitamin-iron-minerals-folic acid (CENTRUM) chewable tablet, Chew 1 tablet daily, Disp: , Rfl:     rosuvastatin (CRESTOR) 20 MG tablet, TAKE 1 TABLET BY MOUTH DAILY, Disp: 90 tablet, Rfl: 1    tamsulosin (FLOMAX) 0.4 mg, Take 1 capsule (0.4 mg total) by mouth daily at bedtime, Disp: 90 capsule, Rfl: 0    TURMERIC PO, Take by mouth Last dose 1014/22, Disp: , Rfl:           Whom besides the patient is providing clinical information about today's encounter?   NO ADDITIONAL HISTORIAN (patient alone provided history)    Physical Exam and Assessment/Plan by Diagnosis:      NEOPLASM OF UNCERTAIN BEHAVIOR OF SKIN    Physical Exam:  (Anatomic Location); (Size and Morphological Description); (Differential Diagnosis):  Specimen A:Mid ParaSpinal Skin; Shave Biopsy; 73 year old MALE with a fleshy 1cm nodule ; Differential Diagnosis: Nevus Versus Lipoma  Pertinent Positives:  Pertinent Negatives:  NO PICTURE DUE TO TECHNICAL DIFFICULTIES    Additional History of Present Condition:  Patient has a spot of concern on the middle of his back. Patient states he  "has had it over 20 years. It has not changed in color or growth. Patient denies bleeding. Patient states when drying himself off with towel it irritates the area.     Assessment and Plan:  I have discussed with the patient that a sample of skin via a \"skin biopsy” would be potentially helpful to further make a specific diagnosis under the microscope.  Based on a thorough discussion of this condition and the management approach to it (including a comprehensive discussion of the known risks, side effects and potential benefits of treatment), the patient (family) agrees to implement the following specific plan:    Procedure:  Skin Biopsy.  After a thorough discussion of treatment options and risk/benefits/alternatives (including but not limited to local pain, scarring, dyspigmentation, blistering, possible superinfection, and inability to confirm a diagnosis via histopathology), verbal and written consent were obtained and portion of the rash was biopsied for tissue sample.  See below for consent that was obtained from patient and subsequent Procedure Note.   PROCEDURE TANGENTIAL (SHAVE) BIOPSY NOTE:    Performing Physician:   Anatomic Location; Clinical Description with size (cm); Pre-Op Diagnosis:   Specimen A:Mid ParaSpinal Skin; Shave Biopsy; 73 year old MALE with a fleshy 1cm nodule ; Differential Diagnosis: Nevus Versus Lipoma  Post-op diagnosis: Same     Local anesthesia: 3:1 1% xylocaine with epi and 1-100,000 buffered     Topical anesthesia: None    Hemostasis: Aluminum chloride, Cautery      After obtaining informed consent  at which time there was a discussion about the purpose of biopsy  and low risks of infection and bleeding.  The area was prepped and draped in the usual fashion. Anesthesia was obtained with 1% lidocaine with epinephrine. A shave biopsy to an appropriate sampling depth was obtained by Shave (Dermablade or 15 blade) The resulting wound was covered with surgical ointment and " "bandaged appropriately.     The patient tolerated the procedure well without complications and was without signs of functional compromise.      Specimen has been sent for review by Dermatopathology.    Standard post-procedure care has been explained and has been included in written form within the patient's copy of Informed Consent.    INFORMED CONSENT DISCUSSION AND POST-OPERATIVE INSTRUCTIONS FOR PATIENT    I.  RATIONALE FOR PROCEDURE  I understand that a skin biopsy allows the Dermatologist to test a lesion or rash under the microscope to obtain a diagnosis.  It usually involves numbing the area with numbing medication and removing a small piece of skin; sometimes the area will be closed with sutures. In this specific procedure, sutures are not usually needed.  If any sutures are placed, then they are usually need to be removed in 2 weeks or less.    I understand that my Dermatologist recommends that a skin \"shave\" biopsy be performed today.  A local anesthetic, similar to the kind that a dentist uses when filling a cavity, will be injected with a very small needle into the skin area to be sampled.  The injected skin and tissue underneath \"will go to sleep” and become numb so no pain should be felt afterwards.  An instrument shaped like a tiny \"razor blade\" (shave biopsy instrument) will be used to cut a small piece of tissue and skin from the area so that a sample of tissue can be taken and examined more closely under the microscope.  A slight amount of bleeding will occur, but it will be stopped with direct pressure and a pressure bandage and any other appropriate methods.  I understands that a scar will form where the wound was created.  Surgical ointment will be applied to help protect the wound.  Sutures are not usually needed.    II.  RISKS AND POTENTIAL COMPLICATIONS   I understand the risks and potential complications of a skin biopsy include but are not limited to the " "following:  Bleeding  Infection  Pain  Scar/keloid  Skin discoloration  Incomplete Removal  Recurrence  Nerve Damage/Numbness/Loss of Function  Allergic Reaction to Anesthesia  Biopsies are diagnostic procedures and based on findings additional treatment or evaluation may be required  Loss or destruction of specimen resulting in no additional findings    My Dermatologist has explained to me the nature of the condition, the nature of the procedure, and the benefits to be reasonably expected compared with alternative approaches.  My Dermatologist has discussed the likelihood of major risks or complications of this procedure including the specific risks listed above, such as bleeding, infection, and scarring/keloid.  I understand that a scar is expected after this procedure.  I understand that my physician cannot predict if the scar will form a \"keloid,\" which extends beyond the borders of the wound that is created.  A keloid is a thick, painful, and bumpy scar.  A keloid can be difficult to treat, as it does not always respond well to therapy, which includes injecting cortisone directly into the keloid every few weeks.  While this usually reduces the pain and size of the scar, it does not eliminate it.      I understand that photographs may be taken before and after the procedure.  These will be maintained as part of the medical providers confidential records and may not be made available to me.  I further authorize the medical provider to use the photographs for teaching purposes or to illustrate scientific papers, books, or lectures if in his/her judgment, medical research, education, or science may benefit from its use.    I have had an opportunity to fully inquire about the risks and benefits of this procedure and its alternatives.   I have been given ample time and opportunity to ask questions and to seek a second opinion if I wished to do so.  I acknowledge that there have specifically been no guarantees as to " "the cosmetic results from the procedure.  I am aware that with any procedure there is always the possibility of an unexpected complication.    III. POST-PROCEDURAL CARE (WHAT YOU WILL NEED TO DO \"AFTER THE BIOPSY\" TO OPTIMIZE HEALING)    Keep the area clean and dry.  Try NOT to remove the bandage or get it wet for the first 24 hours.    Gently clean the area and apply surgical ointment (such as Vaseline petrolatum ointment, which is available \"over the counter\" and not a prescription) to the biopsy site for up to 2 weeks straight.  This acts to protect the wound from the outside world.      Sutures are not usually placed in this procedure.  If any sutures were placed, return for suture removal as instructed (generally 1 week for the face, 2 weeks for the body).      Take Acetaminophen (Tylenol) for discomfort, if no contraindications.  Ibuprofen or aspirin could make bleeding worse.    Call our office immediately for signs of infection: fever, chills, increased redness, warmth, tenderness, discomfort/pain, or pus or foul smell coming from the wound.    WHAT TO DO IF THERE IS ANY BLEEDING?  If a small amount of bleeding is noticed, place a clean cloth over the area and apply firm pressure for ten minutes.  Check the wound after 10 minutes of direct pressure.  If bleeding persists, try one more time for an additional 10 minutes of direct pressure on the area.  If the bleeding becomes heavier or does not stop after the second attempt, or if you have any other questions about this procedure, then please call your [facility]'s Dermatologist by calling [phone] (SKIN).     I hereby acknowledge that I have reviewed and verified the site with my Dermatologist and have requested and authorized my Dermatologist to proceed with the procedure.        ACTINIC KERATOSIS    Physical Exam:  Anatomic Location: Scalp, Cheeks and forehead  Morphologic Description:  Scaly pink papules  Pertinent Positives:  Pertinent " "Negatives:    Additional History of Present Condition:  Found on exam   History of bleeding from this lesion? NO  History of pain, tenderness, and/or itching within this lesion? NO  Personal history of skin cancer? NO  Family history of skin cancer? NO  Previous treatment to the same site? NO    Plan:  PRESCRIPTION MANAGEMENT:  Several topical management options and their side effects were discussed including \"field therapies\" such as Efudex (5-fluorouracil) and/or Aldara (imiquimod). Efudex may be used alone or in combination with Calcipotriene. Begin treatment once regions that have been sprayed with liquid nitrogen are healed. Redness and irritation are to be expected within a few days of field therapy treatment and should resolve within 1 to 2 weeks following treatment. Do NOT cover the treatment areas with bandages. Use a sunscreen with an SPF 50+ while using field therapy.  We discussed the pre-cancerous nature of the condition. Actinic keratosis is found on sun-damaged skin and there is small risk that the condition could turn into a skin cancer called squamous cell carcinoma. There is no risk of actinic keratosis turning into melanoma.  Proliferative actinic keratosis tends to be resistant against standard treatments, including topical therapies and cryotherapy. It has a tendency to develop into infiltrative squamous cell carcinoma. Excision may be considered.  We discussed sun protection measures, including using sunscreen with an SPF 50+ year round, avoiding the sun, and wearing protective clothing such as long sleeves and pants when out in the sun.  Continue to monitor clinically for signs of recurrence. Discussed with patient the importance of keeping up to date with full body skin exams.  Cryotherapy performed in the office (See Procedure Note). We discussed that a hypopigmentation or scar may form in the region following cryotherapy.  Follow up in 6 months for repeat treatment or topical chemotherapy " "   PROCEDURES PERFORMED TODAY ASSOCIATED WITH THIS CONDITION:          Cryotherapy: PROCEDURE:  DESTRUCTION OF PRE-MALIGNANT LESIONS  After a thorough discussion of treatment options and risk/benefits/alternatives (including but not limited to local pain, scarring, dyspigmentation, blistering, and possible superinfection), verbal and written consent were obtained and the aforementioned lesions were treated on with cryotherapy using liquid nitrogen x 1 cycle for 5-10 seconds.    TOTAL NUMBER of 11 pre-malignant lesions were treated today on the ANATOMIC LOCATION: Scalp,cheeks, forehead.     The patient tolerated the procedure well, and after-care instructions were provided.         MEDICAL DECISION MAKING  Treatment Goal:  Resolution of this ACUTE, UNCOMPLICATED condition.       A recent or new short-term problem for which treatment is CONSIDERED OR INITIATED. There is little to no risk of mortality with treatment, and full recovery without functional impairment is expected.  Diagnosis or treatment significantly limited by the following social determinants of health:  NONE IDENTIFIED           SHAIKH ANGIOMAS     Physical Exam:  Anatomic Location Affected:  Trunk and extremities  Morphological Description:  Scattered cherry red papules  Denies pain, itch, bleeding. No treatments tried. Present for years. Present constantly; no modifying factors which make it worse or better.     Assessment and Plan:  Based on a thorough discussion of this condition and the management approach to it (including a comprehensive discussion of the known risks, side effects and potential benefits of treatment), the patient (family) agrees to implement the following specific plan:  Reassure benign        SEBORRHEIC KERATOSIS; NON-INFLAMED     Physical Exam:  Anatomic Location Affected:  Trunk and extremities  Morphological Description:  Waxy, smooth to warty textured, yellow to brownish-grey to dark brown to blackish, discrete, \"stuck-on\" " "appearing papules.  Present for years. Denies pain, itch, bleeding.      Additional History of Present Condition:  Present constantly; no modifying factors which make it worse or better. No prior treatment.       Assessment and Plan:  Based on a thorough discussion of this condition and the management approach to it (including a comprehensive discussion of the known risks, side effects and potential benefits of treatment), the patient (family) agrees to implement the following specific plan:  Reassure benign  Use sun protection.  Apply SPF 30 or higher at least three times a day.  Wear sun protecting clothing and hats.        SOLAR LENTIGINES   OTHER SKIN CHANGES DUE TO CHRONIC EXPOSURE TO NONIONIZING RADIATION     Physical Exam:  Anatomic Location Affected:  Sun exposed areas of back, chest, arms, legs  Morphological Description:  Multiple scattered brown to tan evenly pigmented macules   Denies pain, itch, bleeding. No treatments tried. Present for months - years. Reports getting newer lesions with sun exposure.         Assessment and Plan:  Based on a thorough discussion of this condition and the management approach to it (including a comprehensive discussion of the known risks, side effects and potential benefits of treatment), the patient (family) agrees to implement the following specific plan:  Reassure benign  Use sun protection.  Apply SPF 30 or higher at least three times a day.  Wear sun protecting clothing and hats.         MULTIPLE MELANOCYTIC NEVI (\"Moles\")     Physical Exam:  Anatomic Location Affected: Trunk and extremities  Morphological Description:  Scattered, round to ovoid, symmetrical-appearing, even bordered, skin colored to dark brown macules/papules  Denies pain, itch, bleeding. No treatments tried. Present for years. Present constantly; no modifying factors which make it worse or better. Denies actively changing or growing moles.      Assessment and Plan:  Based on a thorough discussion of " this condition and the management approach to it (including a comprehensive discussion of the known risks, side effects and potential benefits of treatment), the patient (family) agrees to implement the following specific plan:  Reassure benign  Monitor for changes  Use sun protection.  Apply SPF 30 or higher at least three times a day.  Wear sun protecting clothing and hats.       Worrisome signs of skin malignancy discussed, questions answered. Regular self-skin check discussed. Advised to call or return to office if patient notices any spots of concern, rapidly growing/changing lesions, bleeding lesions, non-healing lesions. Advised regular SPF use.    Scribe Attestation      I,:  Betty Garcia am acting as a scribe while in the presence of the attending physician.:       I,:  Mindy Delaney MD personally performed the services described in this documentation    as scribed in my presence.:

## 2024-05-28 PROCEDURE — 88341 IMHCHEM/IMCYTCHM EA ADD ANTB: CPT | Performed by: STUDENT IN AN ORGANIZED HEALTH CARE EDUCATION/TRAINING PROGRAM

## 2024-05-28 PROCEDURE — 88342 IMHCHEM/IMCYTCHM 1ST ANTB: CPT | Performed by: STUDENT IN AN ORGANIZED HEALTH CARE EDUCATION/TRAINING PROGRAM

## 2024-05-28 PROCEDURE — 88305 TISSUE EXAM BY PATHOLOGIST: CPT | Performed by: STUDENT IN AN ORGANIZED HEALTH CARE EDUCATION/TRAINING PROGRAM

## 2024-06-12 ENCOUNTER — OFFICE VISIT (OUTPATIENT)
Dept: CARDIOLOGY CLINIC | Facility: CLINIC | Age: 74
End: 2024-06-12
Payer: COMMERCIAL

## 2024-06-12 VITALS
HEART RATE: 71 BPM | WEIGHT: 210 LBS | BODY MASS INDEX: 33.75 KG/M2 | OXYGEN SATURATION: 98 % | SYSTOLIC BLOOD PRESSURE: 138 MMHG | HEIGHT: 66 IN | DIASTOLIC BLOOD PRESSURE: 76 MMHG

## 2024-06-12 DIAGNOSIS — I25.10 CAD IN NATIVE ARTERY: Primary | ICD-10-CM

## 2024-06-12 DIAGNOSIS — I25.9 ISCHEMIC HEART DISEASE, CHRONIC: ICD-10-CM

## 2024-06-12 DIAGNOSIS — E78.5 DYSLIPIDEMIA: ICD-10-CM

## 2024-06-12 DIAGNOSIS — I10 ESSENTIAL HYPERTENSION: ICD-10-CM

## 2024-06-12 PROCEDURE — 99214 OFFICE O/P EST MOD 30 MIN: CPT | Performed by: INTERNAL MEDICINE

## 2024-06-12 PROCEDURE — 93000 ELECTROCARDIOGRAM COMPLETE: CPT | Performed by: INTERNAL MEDICINE

## 2024-06-12 NOTE — PROGRESS NOTES
Cardiology Follow Up    Alden Brooke  1950  [id number]      Interval History: Alden Brooke is here for follow up of CAD.   Since his last visit, he has noticed generalized myalgias which is predominantly in his legs and upper arms.  Myalgias worsened at night.  There was no inciting event.  In June 2023, rosuvastatin was increased to 10 to 20 mg.  He initially did well with it.  He did develop myalgias with atorvastatin in the past.  He had blood work done on March 26, 2024 which showed an LDL of 65 mg/dL and total cholesterol 127.  Liver function testing and renal testing was normal.  A1c was 6.2%.  Numbers are improved from previous.  He denies any chest pain, shortness of breath, lower extreme edema, orthopnea or paroxysmal nocturnal dyspnea.  During his last visit, stress test was ordered and he was found to have a rate related left bundle branch block.  He subsequently underwent nuclear stress test which was normal with a reduced EF.  Echocardiogram done afterwards revealed normal EF.        Current medication regimen includes Toprol XL 25 mg daily, lisinopril 20 mg daily, rosuvastatin 20 mg, Zetia 10 mg and Vascepa 2 g twice a day.     He has a history of RCA stent in 2014.  Prior to his PCI he had a burning sensation in his chest. He previously was on atorvastatin 80 mg daily which was reduced to 40 mg daily due to myalgias and elevated creatinine kinase.    The following portions of the patient's history were reviewed and updated as appropriate:   He  has a past medical history of Abnormal electrocardiogram, Arthritis, Chest pain, Coronary artery disease, Enthesopathy of elbow (02/15/2006), Epididymo-orchitis (07/10/2012), Hyperlipidemia, Hypertension, Obesity, and Voice disturbance (05/27/2008).  He  has a past surgical history that includes Coronary angioplasty with stent; Appendectomy; Coronary angioplasty; Coronary angioplasty with stent  (2013); pr neuroplasty &/transpos median nrv carpal tunne (Left, 8/13/2018); Angioplasty (2013); Carpal tunnel release (Left, 2018); pr neuroplasty &/transpos median nrv carpal tunne (Right, 9/19/2019); and pr tendon sheath incision (Left, 10/20/2022).  His family history includes Arthritis in his brother; Heart disease in his father and mother; Hypertension in his brother and mother; No Known Problems in his maternal aunt, maternal grandfather, maternal grandmother, maternal uncle, paternal aunt, paternal grandfather, paternal grandmother, and paternal uncle; Obesity in his brother.  He  reports that he has never smoked. He has never used smokeless tobacco. He reports that he does not drink alcohol and does not use drugs.  Current Outpatient Medications   Medication Sig Dispense Refill    aspirin 81 MG tablet Take 1 tablet by mouth daily Last dose is 10/14/22      Biotin 65321 MCG TABS Take 5,000 mg by mouth daily      calcium citrate-vitamin D (CITRACAL+D) 315-200 MG-UNIT per tablet Take 1 tablet by mouth 2 (two) times a day      Cholecalciferol (VITAMIN D3) 2000 units capsule Take 2,000 Units by mouth daily        Coenzyme Q10 (CO Q-10) 400 MG CAPS Take 1 capsule by mouth Daily      CRANBERRY JUICE EXTRACT PO Take by mouth      cycloSPORINE (RESTASIS) 0.05 % ophthalmic emulsion Administer to both eyes every 12 (twelve) hours        ezetimibe (ZETIA) 10 mg tablet TAKE 1 TABLET BY MOUTH IN THE  MORNING 90 tablet 3    Icosapent Ethyl 1 g CAPS TAKE 2 CAPSULES BY MOUTH TWICE  DAILY 360 capsule 3    lisinopril (ZESTRIL) 20 mg tablet TAKE 1 TABLET BY MOUTH DAILY 90 tablet 3    metoprolol succinate (TOPROL-XL) 25 mg 24 hr tablet TAKE 1 TABLET BY MOUTH ONCE DAILY 90 tablet 3    Misc Natural Products (OSTEO BI-FLEX/5-LOXIN ADVANCED) TABS Take by mouth daily      multivitamin-iron-minerals-folic acid (CENTRUM) chewable tablet Chew 1 tablet daily      rosuvastatin (CRESTOR) 20 MG tablet TAKE 1 TABLET BY MOUTH DAILY 90 tablet  "1    tamsulosin (FLOMAX) 0.4 mg Take 1 capsule (0.4 mg total) by mouth daily at bedtime 90 capsule 0    TURMERIC PO Take by mouth Last dose 1014/22       No current facility-administered medications for this visit.     He has No Known Allergies..      Review of Systems:  Review of Systems   Respiratory:  Negative for shortness of breath.    Cardiovascular:  Negative for chest pain, palpitations and leg swelling.   Musculoskeletal:  Positive for arthralgias and myalgias.   All other systems reviewed and are negative.      Physical Exam:  /76 (BP Location: Right arm, Patient Position: Sitting, Cuff Size: Standard)   Pulse 71   Ht 5' 6\" (1.676 m)   Wt 95.3 kg (210 lb)   SpO2 98%   BMI 33.89 kg/m²     Physical Exam  Constitutional:       General: He is not in acute distress.     Appearance: Normal appearance. He is well-developed. He is not diaphoretic.   HENT:      Head: Normocephalic and atraumatic.   Eyes:      General: No scleral icterus.     Conjunctiva/sclera: Conjunctivae normal.      Pupils: Pupils are equal, round, and reactive to light.   Neck:      Thyroid: No thyromegaly.      Vascular: No JVD.   Cardiovascular:      Rate and Rhythm: Normal rate and regular rhythm.      Heart sounds: Normal heart sounds. No murmur heard.     No friction rub. No gallop.   Pulmonary:      Effort: Pulmonary effort is normal.      Breath sounds: Normal breath sounds.   Musculoskeletal:      Cervical back: Neck supple.      Right lower leg: No edema.      Left lower leg: No edema.   Skin:     General: Skin is warm and dry.      Findings: No erythema or rash.   Neurological:      General: No focal deficit present.      Mental Status: He is alert and oriented to person, place, and time. Mental status is at baseline.   Psychiatric:         Mood and Affect: Mood normal.         Behavior: Behavior normal.         Thought Content: Thought content normal.         Judgment: Judgment normal.         Labs:  Lab Results "   Component Value Date     05/12/2017    K 4.6 03/26/2024     03/26/2024    CO2 26 03/26/2024    BUN 17 03/26/2024    CREATININE 0.89 03/26/2024    CREATININE 0.88 09/27/2022    CREATININE 0.82 05/12/2017    GLUCOSE 94 05/12/2017    CALCIUM 9.4 09/27/2022    CALCIUM 8.8 05/12/2017     Lab Results   Component Value Date    WBC 7.96 09/27/2022    HGB 14.2 09/27/2022    HCT 43.5 09/27/2022     (H) 09/27/2022     09/27/2022     Lab Results   Component Value Date    CHOL 112 05/12/2017    TRIG 111 03/26/2024    HDL 42 03/26/2024     Imaging: No results found.    Discussion/Summary:  1. Coronary artery disease involving native coronary artery of native heart without angina pectoris  -   Continue Toprol XL 25 mg daily.  -   Continue aspirin 81 mg daily for secondary prevention.  -   Discussed diet and exercise.     -Patient developed a rate related left bundle branch block with stress testing.  Further testing revealed normal ejection fraction and no significant ischemia or infarction.    2. Essential hypertension  -   Blood pressure target is less than 130/80.   Continue current medication regimen.    3. Dyslipidemia  -   LDL target is less than 70 mg/dL.  Triglycerides less than 150.   -Atorvastatin stopped because of myalgias.  He is currently on Crestor 20 mg daily but has developed generalized myalgias.  Will hold Crestor for 1 to 2 weeks until myalgias resolve and then begin at 10 mg daily.  - Continue Zetia and Vascepa

## 2024-06-13 DIAGNOSIS — N40.0 BPH WITHOUT URINARY OBSTRUCTION: ICD-10-CM

## 2024-06-13 RX ORDER — TAMSULOSIN HYDROCHLORIDE 0.4 MG/1
0.4 CAPSULE ORAL
Qty: 90 CAPSULE | Refills: 1 | Status: SHIPPED | OUTPATIENT
Start: 2024-06-13

## 2024-06-19 ENCOUNTER — TELEPHONE (OUTPATIENT)
Age: 74
End: 2024-06-19

## 2024-06-19 DIAGNOSIS — I25.10 CAD IN NATIVE ARTERY: Primary | ICD-10-CM

## 2024-06-19 RX ORDER — ROSUVASTATIN CALCIUM 10 MG/1
10 TABLET, COATED ORAL DAILY
Qty: 90 TABLET | Refills: 3 | Status: SHIPPED | OUTPATIENT
Start: 2024-06-19 | End: 2024-06-28

## 2024-06-19 NOTE — PROGRESS NOTES
Patient developed myalgias with atorvastatin and higher dose Rosuvasatatin.  Resolved with holding medication.  Will begin rosuvastatin 10 mg daily.

## 2024-06-19 NOTE — TELEPHONE ENCOUNTER
Received call from patient after he held Crestor for myalgias per Dr. Caal's instructions given on 6/12/24.  Pt states the muscle aches have almost completely resolved.  Per visit note 6/12, if myalgias resolve pt to begin 10 mg daily.    Please confirm you'd like patient to start lower dose of the Crestor.  Pt would like new order to be sent to [facility].

## 2024-06-24 ENCOUNTER — OFFICE VISIT (OUTPATIENT)
Dept: FAMILY MEDICINE CLINIC | Facility: CLINIC | Age: 74
End: 2024-06-24
Payer: COMMERCIAL

## 2024-06-24 VITALS
RESPIRATION RATE: 16 BRPM | DIASTOLIC BLOOD PRESSURE: 82 MMHG | HEART RATE: 72 BPM | TEMPERATURE: 98.5 F | WEIGHT: 211 LBS | BODY MASS INDEX: 33.91 KG/M2 | SYSTOLIC BLOOD PRESSURE: 130 MMHG | OXYGEN SATURATION: 98 % | HEIGHT: 66 IN

## 2024-06-24 DIAGNOSIS — T46.6X5A MYALGIA DUE TO STATIN: Primary | ICD-10-CM

## 2024-06-24 DIAGNOSIS — M79.10 MYALGIA DUE TO STATIN: Primary | ICD-10-CM

## 2024-06-24 PROCEDURE — 99214 OFFICE O/P EST MOD 30 MIN: CPT | Performed by: FAMILY MEDICINE

## 2024-06-24 PROCEDURE — G2211 COMPLEX E/M VISIT ADD ON: HCPCS | Performed by: FAMILY MEDICINE

## 2024-06-24 NOTE — PROGRESS NOTES
Chief Complaint   Patient presents with   • Muscle Pain     Pt c/o muscle aches x1 month        Patient ID: Alden Brooke is a 73 y.o. male.    HPI  Pt is seeing for muscle pain for over 1 m -  started after statin dose was increased by cardiologist -  pain resolved after statin was on hold x 2 wks, now restart Crestor 10 mg and muscle pian is coming back but not as severe -  mostly at night     The following portions of the patient's history were reviewed and updated as appropriate: allergies, current medications, past family history, past medical history, past social history, past surgical history and problem list.    Review of Systems   Constitutional: Negative.    Respiratory: Negative.     Cardiovascular: Negative.    Gastrointestinal: Negative.    Genitourinary: Negative.    Musculoskeletal:  Positive for myalgias. Negative for arthralgias and joint swelling.   Skin: Negative.    Neurological: Negative.        Current Outpatient Medications   Medication Sig Dispense Refill   • aspirin 81 MG tablet Take 1 tablet by mouth daily Last dose is 10/14/22     • Biotin 49413 MCG TABS Take 5,000 mg by mouth daily     • calcium citrate-vitamin D (CITRACAL+D) 315-200 MG-UNIT per tablet Take 1 tablet by mouth 2 (two) times a day     • Cholecalciferol (VITAMIN D3) 2000 units capsule Take 2,000 Units by mouth daily       • Coenzyme Q10 (CO Q-10) 400 MG CAPS Take 1 capsule by mouth Daily     • CRANBERRY JUICE EXTRACT PO Take by mouth     • cycloSPORINE (RESTASIS) 0.05 % ophthalmic emulsion Administer to both eyes every 12 (twelve) hours       • ezetimibe (ZETIA) 10 mg tablet TAKE 1 TABLET BY MOUTH IN THE  MORNING 90 tablet 3   • Icosapent Ethyl 1 g CAPS TAKE 2 CAPSULES BY MOUTH TWICE  DAILY 360 capsule 3   • lisinopril (ZESTRIL) 20 mg tablet TAKE 1 TABLET BY MOUTH DAILY 90 tablet 3   • Misc Natural Products (OSTEO BI-FLEX/5-LOXIN ADVANCED) TABS Take by mouth daily     • multivitamin-iron-minerals-folic acid (CENTRUM)  "chewable tablet Chew 1 tablet daily     • rosuvastatin (CRESTOR) 10 MG tablet Take 1 tablet (10 mg total) by mouth daily 90 tablet 3   • tamsulosin (FLOMAX) 0.4 mg take 1 capsule by mouth at bedtime 90 capsule 1   • TURMERIC PO Take by mouth Last dose 1014/22     • metoprolol succinate (TOPROL-XL) 25 mg 24 hr tablet TAKE 1 TABLET BY MOUTH ONCE DAILY 90 tablet 3     No current facility-administered medications for this visit.       Objective:    /82 (BP Location: Left arm, Patient Position: Sitting, Cuff Size: Adult)   Pulse 72   Temp 98.5 °F (36.9 °C) (Temporal)   Resp 16   Ht 5' 6\" (1.676 m)   Wt 95.7 kg (211 lb)   SpO2 98%   BMI 34.06 kg/m²        Physical Exam  Constitutional:       General: He is not in acute distress.     Appearance: He is obese. He is not ill-appearing.   Cardiovascular:      Rate and Rhythm: Normal rate.   Pulmonary:      Effort: Pulmonary effort is normal. No respiratory distress.   Musculoskeletal:         General: No swelling or tenderness.   Skin:     Findings: No rash.   Neurological:      Mental Status: He is alert.           Labs in chart were reviewed.      Assessment/Plan:         Diagnoses and all orders for this visit:    Myalgia due to statin      Has CAD -  statins are the main therapy -  will try to cont Crestor at 10 mg and antiinflammatory diet with weight loss would help     Rto prирина Allison MD      "

## 2024-06-27 ENCOUNTER — TELEPHONE (OUTPATIENT)
Age: 74
End: 2024-06-27

## 2024-06-27 NOTE — TELEPHONE ENCOUNTER
Patient resumed rosuvastatin at the lower dose of 10 mg daily.  He said the muscle pain has returned.    Patient said a retired doctor who lives next door to him mentioned a med called Nexletol that might be a replacement for the statin.    Please advise.

## 2024-06-28 ENCOUNTER — TELEPHONE (OUTPATIENT)
Age: 74
End: 2024-06-28

## 2024-06-28 DIAGNOSIS — E78.5 DYSLIPIDEMIA: ICD-10-CM

## 2024-06-28 DIAGNOSIS — Z78.9 STATIN INTOLERANCE: ICD-10-CM

## 2024-06-28 DIAGNOSIS — I25.10 CAD IN NATIVE ARTERY: Primary | ICD-10-CM

## 2024-06-28 RX ORDER — BEMPEDOIC ACID 180 MG/1
1 TABLET, FILM COATED ORAL DAILY
Qty: 30 TABLET | Refills: 5 | Status: SHIPPED | OUTPATIENT
Start: 2024-06-28

## 2024-06-28 NOTE — TELEPHONE ENCOUNTER
PA for bempedoic acid 180 mg submitted     Submitted via    []CMM-KEY   [x][employer]-Case ID # [id number]   []Faxed to plan   []Other website   []Phone call Case ID #     Office notes sent, clinical questions answered. Awaiting determination    Turnaround time for your insurance to make a decision on your Prior Authorization can take 7-21 business days.

## 2024-07-01 NOTE — TELEPHONE ENCOUNTER
I spoke with patient, made aware of order for nexletol.   He was wondering if he can continue turmeric and coq10?

## 2024-07-01 NOTE — TELEPHONE ENCOUNTER
Spoke with patient he has question about medication CO Q-10 & his bempedoic /back #  [phone]         PA for BEMPEDOIC Approved     Date(s) approved June 28, 2024 to December 28, 2024       Patient advised by          [] [employer] Message  [x] Phone call   []LMOM  []L/M to call office as no active Communication consent on file  []Unable to leave detailed message as VM not approved on Communication consent       Pharmacy advised by    []Fax  []Phone call    Approval letter scanned into [employer] No

## 2024-07-08 ENCOUNTER — TELEPHONE (OUTPATIENT)
Age: 74
End: 2024-07-08

## 2024-07-08 NOTE — TELEPHONE ENCOUNTER
Pl, advise pt to call his insurance for participating providers -  we have over 6 m waiting time for  rheumatology

## 2024-07-09 ENCOUNTER — TELEPHONE (OUTPATIENT)
Dept: CARDIOLOGY CLINIC | Facility: CLINIC | Age: 74
End: 2024-07-09

## 2024-07-09 DIAGNOSIS — Z78.9 STATIN INTOLERANCE: ICD-10-CM

## 2024-07-09 DIAGNOSIS — I25.10 CAD IN NATIVE ARTERY: ICD-10-CM

## 2024-07-09 DIAGNOSIS — E78.5 DYSLIPIDEMIA: ICD-10-CM

## 2024-07-09 RX ORDER — BEMPEDOIC ACID 180 MG/1
1 TABLET, FILM COATED ORAL DAILY
Qty: 30 TABLET | Refills: 5 | Status: CANCELLED | OUTPATIENT
Start: 2024-07-09

## 2024-07-09 NOTE — TELEPHONE ENCOUNTER
Medication: Nexletol    Dose/Frequency: 180 mg by mouth in the morning     Quantity: 90    Pharmacy: [facility]    Office:   [x] PCP/Provider - Dr. Caal  [] Speciality/Provider -     Does the patient have enough for 3 days?   [x] Yes   [] No - Send as HP to POD

## 2024-07-10 RX ORDER — BEMPEDOIC ACID 180 MG/1
1 TABLET, FILM COATED ORAL DAILY
Qty: 90 TABLET | Refills: 3 | Status: SHIPPED | OUTPATIENT
Start: 2024-07-10

## 2024-07-22 DIAGNOSIS — I25.10 CORONARY ARTERY DISEASE INVOLVING NATIVE CORONARY ARTERY OF NATIVE HEART WITHOUT ANGINA PECTORIS: ICD-10-CM

## 2024-07-22 RX ORDER — METOPROLOL SUCCINATE 25 MG/1
25 TABLET, EXTENDED RELEASE ORAL DAILY
Qty: 90 TABLET | Refills: 1 | Status: SHIPPED | OUTPATIENT
Start: 2024-07-22

## 2024-08-09 ENCOUNTER — TELEPHONE (OUTPATIENT)
Dept: CARDIOLOGY CLINIC | Facility: CLINIC | Age: 74
End: 2024-08-09

## 2024-08-09 ENCOUNTER — TELEPHONE (OUTPATIENT)
Age: 74
End: 2024-08-09

## 2024-08-09 NOTE — TELEPHONE ENCOUNTER
Patient stopped in sees Dr Caal would like to know if he can take advil for arthritis pain, please advise

## 2024-08-09 NOTE — TELEPHONE ENCOUNTER
FYI: patient called to get an update on the referral he requested earlier today. He was told it is still being worked on.  Pt wanted to let PCP know that he will be going on vacation soon and wanted to handle this situation as soon as possible.

## 2024-08-12 ENCOUNTER — EVALUATION (OUTPATIENT)
Dept: OCCUPATIONAL THERAPY | Facility: CLINIC | Age: 74
End: 2024-08-12
Payer: COMMERCIAL

## 2024-08-12 ENCOUNTER — OFFICE VISIT (OUTPATIENT)
Dept: FAMILY MEDICINE CLINIC | Facility: CLINIC | Age: 74
End: 2024-08-12
Payer: COMMERCIAL

## 2024-08-12 VITALS
SYSTOLIC BLOOD PRESSURE: 138 MMHG | OXYGEN SATURATION: 99 % | TEMPERATURE: 98.2 F | HEIGHT: 66 IN | BODY MASS INDEX: 32.82 KG/M2 | DIASTOLIC BLOOD PRESSURE: 72 MMHG | RESPIRATION RATE: 16 BRPM | HEART RATE: 90 BPM | WEIGHT: 204.2 LBS

## 2024-08-12 DIAGNOSIS — M79.10 MYALGIA: Primary | ICD-10-CM

## 2024-08-12 DIAGNOSIS — Z12.11 ENCOUNTER FOR COLORECTAL CANCER SCREENING: ICD-10-CM

## 2024-08-12 DIAGNOSIS — Z12.12 ENCOUNTER FOR COLORECTAL CANCER SCREENING: ICD-10-CM

## 2024-08-12 PROCEDURE — G2211 COMPLEX E/M VISIT ADD ON: HCPCS | Performed by: FAMILY MEDICINE

## 2024-08-12 PROCEDURE — 99214 OFFICE O/P EST MOD 30 MIN: CPT | Performed by: FAMILY MEDICINE

## 2024-08-12 PROCEDURE — 97166 OT EVAL MOD COMPLEX 45 MIN: CPT

## 2024-08-12 RX ORDER — MAGNESIUM 30 MG
500 TABLET ORAL 2 TIMES DAILY
COMMUNITY

## 2024-08-12 RX ORDER — SENNOSIDES 8.6 MG
650 CAPSULE ORAL EVERY 8 HOURS PRN
COMMUNITY

## 2024-08-12 NOTE — TELEPHONE ENCOUNTER
Spoke with patient, he says physical therapist told him that ongoing pains are due to arthritis and he is need of occupational therapy. Per Dr Perez, I scheduled patient for appointment today to discuss.

## 2024-08-12 NOTE — PROGRESS NOTES
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 male.    HPI  Pt is seeing for f/u myalgia for several months - both arms, hands, thighs  -  no rash -  initially related to statins -  stopped  -  still on Zetia -  had BW with different office and was \" diagnosed with arthritis\"     The following portions of the patient's history were reviewed and updated as appropriate: allergies, current medications, past family history, past medical history, past social history, past surgical history and problem list.    Review of Systems   Constitutional: Negative.    Respiratory: Negative.     Cardiovascular: Negative.    Gastrointestinal: Negative.    Musculoskeletal:  Positive for myalgias. Negative for arthralgias and joint swelling.   Skin:  Negative for rash.   Neurological: Negative.        Current Outpatient Medications   Medication Sig Dispense Refill   • acetaminophen (TYLENOL) 650 mg CR tablet Take 650 mg by mouth every 8 (eight) hours as needed for mild pain     • aspirin 81 MG tablet Take 1 tablet by mouth daily Last dose is 10/14/22     • Bempedoic Acid (Nexletol) 180 MG TABS Take 1 tablet by mouth in the morning 90 tablet 3   • Biotin 37779 MCG TABS Take 5,000 mg by mouth daily     • calcium citrate-vitamin D (CITRACAL+D) 315-200 MG-UNIT per tablet Take 1 tablet by mouth 2 (two) times a day     • Cholecalciferol (VITAMIN D3) 2000 units capsule Take 2,000 Units by mouth daily       • Coenzyme Q10 (CO Q-10) 400 MG CAPS Take 1 capsule by mouth Daily     • CRANBERRY JUICE EXTRACT PO Take by mouth     • cycloSPORINE (RESTASIS) 0.05 % ophthalmic emulsion Administer to both eyes every 12 (twelve) hours       • ezetimibe (ZETIA) 10 mg tablet TAKE 1 TABLET BY MOUTH IN THE  MORNING 90 tablet 3   • Icosapent Ethyl 1 g CAPS TAKE 2 CAPSULES BY MOUTH TWICE  " "DAILY 360 capsule 3   • lisinopril (ZESTRIL) 20 mg tablet TAKE 1 TABLET BY MOUTH DAILY 90 tablet 3   • magnesium 30 MG tablet Take 500 mg by mouth 2 (two) times a day     • metoprolol succinate (TOPROL-XL) 25 mg 24 hr tablet TAKE 1 TABLET BY MOUTH ONCE  DAILY 90 tablet 1   • Misc Natural Products (OSTEO BI-FLEX/5-LOXIN ADVANCED) TABS Take by mouth daily     • multivitamin-iron-minerals-folic acid (CENTRUM) chewable tablet Chew 1 tablet daily     • tamsulosin (FLOMAX) 0.4 mg take 1 capsule by mouth at bedtime 90 capsule 1   • TURMERIC PO Take by mouth Last dose 1014/22       No current facility-administered medications for this visit.       Objective:    /72 (BP Location: Left arm, Patient Position: Sitting, Cuff Size: Large)   Pulse 90   Temp 98.2 °F (36.8 °C)   Resp 16   Ht 5' 6\" (1.676 m)   Wt 92.6 kg (204 lb 3.2 oz)   SpO2 99%   BMI 32.96 kg/m²        Physical Exam  Constitutional:       General: He is not in acute distress.     Appearance: He is obese. He is not ill-appearing.   Cardiovascular:      Rate and Rhythm: Normal rate.   Pulmonary:      Effort: Pulmonary effort is normal. No respiratory distress.   Musculoskeletal:         General: No swelling, tenderness or deformity.      Right lower leg: No edema.      Left lower leg: No edema.   Skin:     Findings: No erythema or rash.   Neurological:      Mental Status: He is alert.                 Assessment/Plan:         Diagnoses and all orders for this visit:    Myalgia  -     Ambulatory Referral to Occupational Therapy; Future    Will hold Zetia for 2 wks   Encounter for colorectal cancer screening  -     Ambulatory referral to Gastroenterology; Future    Other orders  -     magnesium 30 MG tablet; Take 500 mg by mouth 2 (two) times a day  -     acetaminophen (TYLENOL) 650 mg CR tablet; Take 650 mg by mouth every 8 (eight) hours as needed for mild pain          Rto prn                   Ana Allison MD      "

## 2024-08-12 NOTE — PROGRESS NOTES
OT Evaluation     Today's date: 2024  Patient name: Alden Brooke  : 1950  MRN: [id number]  Referring provider: Ana Allison MD  Dx:   Encounter Diagnosis     ICD-10-CM    1. Myalgia  M79.10 Ambulatory Referral to Occupational Therapy                     Assessment  Impairments: abnormal or restricted ROM, impaired physical strength, lacks appropriate home exercise program and pain with function    Assessment details: Patient is a 73 y.o. RHD male who presents for OT IE and treatment for B/L hand pain. Patient reports having ongoing pain over the last 3 months in fingers, and arms intermittently with activities and at night. Patient referred by Dr. Orlando to initiate treatment including hand therapy.    Understanding of Dx/Px/POC: good     Prognosis: good    Goals  Short term goals 2-4 weeks  Establish HEP to enhance performance with ADLs.    In crease  strength by 10 lbs. to enhance gripping hand tools.       Long Term goals by discharge  Establish final home exercise program to enhance maximal functional level with ADLs.    Achieve functional active range of motion of BUE for full return to household chores.       Achieve functional strength of BUE for full return to high level ADLs.          Plan  Patient would benefit from: OT eval, skilled occupational therapy, orthotics and custom splinting  Planned modality interventions: thermotherapy: hydrocollator packs, cryotherapy, TENS, unattended electrical stimulation, ultrasound and electrical stimulation/Russian stimulation    Planned therapy interventions: manual therapy, joint mobilization, strengthening, stretching, therapeutic activities, therapeutic exercise, home exercise program, graded exercise, graded activity, functional ROM exercises, flexibility, orthotic fitting/training, IASTM and kinesiology taping    Frequency: 1-2x week  Duration in weeks: 10  Plan of Care beginning date: 2024  Plan of Care expiration date:  10/21/2024  Treatment plan discussed with: patient        Subjective Evaluation    History of Present Illness  Mechanism of injury: Patient is a 73 y.o. RHD male who presents for OT IE and treatment for B/L hand pain. Patient reports having ongoing pain over the last 3 months in fingers, and arms intermittently with activities and at night. Patient referred by Dr. Orlando to initiate treatment including hand therapy.    Pain  Current pain ratin  At best pain ratin  At worst pain ratin  Quality: dull ache and sharp        Objective     Active Range of Motion     Left Wrist   Wrist flexion: WFL  Wrist extension: WFL  Radial deviation: WFL  Ulnar deviation: WFL      Right Wrist   Wrist flexion: WFL  Wrist extension: WFl  Radial deviation: WFL  Ulnar deviation: WFL    Strength/Myotome Testing     Left Wrist/Hand      (2nd hand position)     Trial 1: 18    Trial 2: 30    Trial 3: 25    Average: 24.33    Thumb Strength  Key/Lateral Pinch     Trial 1: 13  Tip/Two-Point Pinch     Trial 1: 2  Palmar/Three-Point Pinch     Trial 1: 7    Right Wrist/Hand      (2nd hand position)     Trial 1: 26    Trial 2: 30    Trial 3: 30    Average: 28.67    Thumb Strength   Key/Lateral Pinch     Trial 1: 10  Tip/Two-Point Pinch     Trial 1: 2  Palmar/Three-Point Pinch     Trial 1: 7               Auth Tracker  Auth Status Total   Visits  Expiration date Co-Insurance   pending                                  Visit Tracker  Date                                                                                         PMHx:   has a past medical history of Abnormal electrocardiogram, Arthritis, Chest pain, Coronary artery disease, Enthesopathy of elbow (02/15/2006), Epididymo-orchitis (07/10/2012), Hyperlipidemia, Hypertension, Obesity, and Voice disturbance (2008).    Precautions:        Manuals HEP 2024                       Ther Ex     Education on HEP and dx  x5min   AROM tendon glides x x10   AROM table  top extension x x10   AROM digit add/abduction x x10   AROM wrist flex/ext/RD/UD x x10   PROM elbow ext   5x5sec    Prayer stretch x x3 10 sec   FDC stretch  5x5 sec        Ther Act                     Modalities     MHP  5 min           Assessment:   Patient tolerated session well. Patient demonstrates Strength and pain deficits upon assessment today. Patient session focused on patient education on anatomy and physiology concerning current dx, techniques for decreasing deficits through HEP, and appropriate use of modalities. Patient educated on HEP to include modalities and ROM TE  with verbal instructions and handouts for patient reference. Patient educated on treatment plan at this time. Patient benefiting from skilled hand therapy OT to reduce deficits to improve independence with daily activities      Plan:   Focus on ROM  to improve ability to complete daily activites with ease.  POC 8/12/24 - 10/21/24

## 2024-08-14 ENCOUNTER — OFFICE VISIT (OUTPATIENT)
Dept: OCCUPATIONAL THERAPY | Facility: CLINIC | Age: 74
End: 2024-08-14
Payer: COMMERCIAL

## 2024-08-14 ENCOUNTER — NURSE TRIAGE (OUTPATIENT)
Age: 74
End: 2024-08-14

## 2024-08-14 DIAGNOSIS — M79.10 MYALGIA: Primary | ICD-10-CM

## 2024-08-14 PROCEDURE — 97110 THERAPEUTIC EXERCISES: CPT

## 2024-08-14 NOTE — TELEPHONE ENCOUNTER
Chief Complaint: lost 10 lbs in 1 month    History of Present Illness (HPI): patient was taken off statin, and switched to Nexletol.  PCP few days ago advised him to discontinue Zetia as he was still having muscle pain- advised him to d/c for a few weeks and see if sx improve    VS/Weight: 204 lbs    Pain: No    Risk Factors: Hypertension    Recent Testing: Other n/a    Medicine: Nexletol    Upcoming Office Visit: Yes    Last Office Visit: 6/12/24    Additional Comments: pt would like to know if there are any medications he is taking that can contribute to weight loss.  He would also like to know if Dr. Caal agrees with him stopping Zetia for a few weeks.    Please advise.

## 2024-08-14 NOTE — PROGRESS NOTES
Daily Note     Today's date: 2024  Patient name: Alden Brooke  : 1950  MRN: [id number]  Referring provider: Ana Allison MD  Dx:   Encounter Diagnosis     ICD-10-CM    1. Myalgia  M79.10                      Subjective: Patient notes consistency with HEP.       Objective: See treatment diary below         Auth Tracker  Auth Status Total   Visits  Expiration date Co-Insurance   pending                                  Visit Tracker  Date                                                                                        PMHx:   has a past medical history of Abnormal electrocardiogram, Arthritis, Chest pain, Coronary artery disease, Enthesopathy of elbow (02/15/2006), Epididymo-orchitis (07/10/2012), Hyperlipidemia, Hypertension, Obesity, and Voice disturbance (2008).    Precautions:        Manuals HEP 2024                        Ther Ex     Education on HEP and dx  x5min   AROM tendon glides x x10   AROM table top extension x x10   AROM digit add/abduction x x10   AROM wrist flex/ext/RD/UD x x10   PROM elbow ext   5x5sec    Prayer stretch x x3 10 sec   FDC stretch  5x5 sec   Theraputty    X10 yellow   Digit ext band  X10 red        Ther Act                     Modalities     MHP  5 min           Assessment:   Patient tolerated session well. Session focused on ROM and gentle strengthening to improve deficits and functional performance with daily activities. Patient tolerated all TE/TA with no complaints. Patient progressing well towards goals. Patient benefiting from skilled hand therapy OT to reduce deficits to improve independence with daily activities.        Plan:   Focus on ROM  to improve ability to complete daily activites with ease.  POC 24 - 10/21/24

## 2024-08-14 NOTE — TELEPHONE ENCOUNTER
I spoke with wife - made aware of above.   She wanted to know if the reason for his aches were related to the statin?  He is going to physical therapy for arm and finger pain.

## 2024-08-14 NOTE — TELEPHONE ENCOUNTER
"Reason for Disposition   Nursing judgment    Answer Assessment - Initial Assessment Questions  1. REASON FOR CALL: \"What is your main concern right now?\"      Weight loss  2. ONSET: \"When did the weight loss start?\"      Month ago   3. SEVERITY: \"How bad is the weight loss?\"      Lost 10 lbs in past month  4. FEVER: \"Do you have a fever?\"      denies  5. OTHER SYMPTOMS: \"Do you have any other new symptoms?\"      denies  6. INTERVENTIONS AND RESPONSE: \"What have you done so far to try to make this better? What medications have you used?\"      PCP discontinued Zetia    Protocols used: No Protocol Available-ADULT-[place]    "

## 2024-08-19 ENCOUNTER — OFFICE VISIT (OUTPATIENT)
Dept: OCCUPATIONAL THERAPY | Facility: CLINIC | Age: 74
End: 2024-08-19
Payer: COMMERCIAL

## 2024-08-19 DIAGNOSIS — M79.10 MYALGIA: Primary | ICD-10-CM

## 2024-08-19 PROCEDURE — 97110 THERAPEUTIC EXERCISES: CPT

## 2024-08-19 NOTE — PROGRESS NOTES
Daily Note     Today's date: 2024  Patient name: Alden Brooke  : 1950  MRN: [id number]  Referring provider: Ana Allison MD  Dx:   Encounter Diagnosis     ICD-10-CM    1. Myalgia  M79.10                      Subjective: Patient notes consistency with HEP.       Objective: See treatment diary below         Auth Tracker  Auth Status Total   Visits  Expiration date Co-Insurance   pending                                  Visit Tracker  Date                                                                                       PMHx:   has a past medical history of Abnormal electrocardiogram, Arthritis, Chest pain, Coronary artery disease, Enthesopathy of elbow (02/15/2006), Epididymo-orchitis (07/10/2012), Hyperlipidemia, Hypertension, Obesity, and Voice disturbance (2008).    Precautions:        Manuals HEP 2024                        Ther Ex     Education on HEP and dx  x5min   AROM tendon glides x x10   AROM table top extension x x10   AROM digit add/abduction x x10   AROM wrist flex/ext/RD/UD x x10   PROM elbow ext   5 x 5 sec    Prayer stretch x x3 10 sec   FDC stretch  5x5 sec   Theraputty    X10 yellow   Digit ext band  X10 red   Theraband rows, triceps, shoulder ext, biceps  X10 red              Ther Act                     Modalities     MHP  5 min           Assessment:   Patient tolerated session well. Session focused on ROM and gentle strengthening to improve deficits and functional performance with daily activities. Patient tolerated all TE/TA with no complaints. Patient progressing well towards goals. Patient benefiting from skilled hand therapy OT to reduce deficits to improve independence with daily activities.        Plan:   Focus on ROM and strengthening to improve ability to complete daily activites with ease.  POC 24 - 10/21/24

## 2024-08-21 ENCOUNTER — OFFICE VISIT (OUTPATIENT)
Dept: OCCUPATIONAL THERAPY | Facility: CLINIC | Age: 74
End: 2024-08-21
Payer: COMMERCIAL

## 2024-08-21 DIAGNOSIS — M79.10 MYALGIA: Primary | ICD-10-CM

## 2024-08-21 PROCEDURE — 97110 THERAPEUTIC EXERCISES: CPT

## 2024-08-21 NOTE — PROGRESS NOTES
Daily Note     Today's date: 2024  Patient name: Alden Brooke  : 1950  MRN: [id number]  Referring provider: Ana Allison MD  Dx:   Encounter Diagnosis     ICD-10-CM    1. Myalgia  M79.10                      Subjective: Patient notes consistency with HEP.       Objective: See treatment diary below         Auth Tracker  Auth Status Total   Visits  Expiration date Co-Insurance   pending                                  Visit Tracker  Date                                                                                      PMHx:   has a past medical history of Abnormal electrocardiogram, Arthritis, Chest pain, Coronary artery disease, Enthesopathy of elbow (02/15/2006), Epididymo-orchitis (07/10/2012), Hyperlipidemia, Hypertension, Obesity, and Voice disturbance (2008).    Precautions:        Manuals HEP 2024                        Ther Ex     Education on HEP and dx  x5min   AROM tendon glides x x10   AROM table top extension x x10   AROM digit add/abduction x x10   AROM wrist flex/ext/RD/UD x x10   PROM elbow ext   5 x 5 sec    Prayer stretch x x3 10 sec   FDC stretch  5x5 sec   Theraputty    X10 yellow   Digit ext band  X10 red   Theraband rows, triceps, shoulder ext, biceps  X10 red              Ther Act                     Modalities     MHP  5 min           Assessment:   Patient tolerated session well. Session focused on ROM and gentle strengthening to improve deficits and functional performance with daily activities. Patient tolerated all TE/TA with no complaints. Patient progressing well towards goals. Patient benefiting from skilled hand therapy OT to reduce deficits to improve independence with daily activities.        Plan:   Focus on ROM and strengthening to improve ability to complete daily activites with ease.  POC 24 - 10/21/24

## 2024-08-23 ENCOUNTER — APPOINTMENT (OUTPATIENT)
Dept: OCCUPATIONAL THERAPY | Facility: CLINIC | Age: 74
End: 2024-08-23
Payer: COMMERCIAL

## 2024-08-26 ENCOUNTER — APPOINTMENT (OUTPATIENT)
Dept: OCCUPATIONAL THERAPY | Facility: CLINIC | Age: 74
End: 2024-08-26
Payer: COMMERCIAL

## 2024-09-19 ENCOUNTER — NEW PATIENT COMPREHENSIVE (OUTPATIENT)
Dept: URBAN - METROPOLITAN AREA CLINIC 6 | Facility: CLINIC | Age: 74
End: 2024-09-19

## 2024-09-19 DIAGNOSIS — H35.371: ICD-10-CM

## 2024-09-19 DIAGNOSIS — H43.22: ICD-10-CM

## 2024-09-19 DIAGNOSIS — H25.813: ICD-10-CM

## 2024-09-19 DIAGNOSIS — H04.123: ICD-10-CM

## 2024-09-19 DIAGNOSIS — H43.813: ICD-10-CM

## 2024-09-19 PROCEDURE — 92004 COMPRE OPH EXAM NEW PT 1/>: CPT

## 2024-09-19 PROCEDURE — 92134 CPTRZ OPH DX IMG PST SGM RTA: CPT

## 2024-09-19 ASSESSMENT — TONOMETRY
OD_IOP_MMHG: 25
OS_IOP_MMHG: 23

## 2024-09-19 ASSESSMENT — VISUAL ACUITY
OD_CC: 20/50
OD_PH: 20/30-2
OS_CC: 20/70+2
OS_PH: 20/40
OS_GLARE: 20/100
OD_GLARE: 20/60

## 2024-09-23 ENCOUNTER — OFFICE VISIT (OUTPATIENT)
Dept: OCCUPATIONAL THERAPY | Facility: CLINIC | Age: 74
End: 2024-09-23
Payer: COMMERCIAL

## 2024-09-23 DIAGNOSIS — M79.10 MYALGIA: Primary | ICD-10-CM

## 2024-09-23 PROCEDURE — 97110 THERAPEUTIC EXERCISES: CPT

## 2024-09-23 NOTE — PROGRESS NOTES
Daily Note     Today's date: 2024  Patient name: Alden Brooke  : 1950  MRN: [id number]  Referring provider: Ana Allison MD  Dx:   Encounter Diagnosis     ICD-10-CM    1. Myalgia  M79.10                        Subjective: Patient notes consistency with HEP. Patient on vacation for last 3 weeks.       Objective: See treatment diary below         Auth Tracker  Auth Status Total   Visits  Expiration date Co-Insurance   pending                                  Visit Tracker  Date                                                                                     PMHx:   has a past medical history of Abnormal electrocardiogram, Arthritis, Chest pain, Coronary artery disease, Enthesopathy of elbow (02/15/2006), Epididymo-orchitis (07/10/2012), Hyperlipidemia, Hypertension, Obesity, and Voice disturbance (2008).    Precautions:        Manuals HEP 2024                        Ther Ex     Education on HEP and dx  x5min   AROM tendon glides x x10   AROM table top extension x x10   AROM digit add/abduction x x10   AROM wrist flex/ext/RD/UD x x10   PROM elbow ext   5 x 5 sec    Prayer stretch x x3 10 sec   FDC stretch  5x5 sec   Theraputty    X10 yellow   Digit ext band  X10 red   Theraband rows, triceps, shoulder ext, biceps  X10 red              Ther Act                     Modalities     MHP  5 min           Assessment:   Patient tolerated session well. Session focused on ROM and gentle strengthening to improve deficits and functional performance with daily activities. Patient tolerated all TE/TA with no complaints. Patient progressing well towards goals. Patient benefiting from skilled hand therapy OT to reduce deficits to improve independence with daily activities.        Plan:   Focus on ROM and strengthening to improve ability to complete daily activites with ease.  POC 24 - 10/21/24

## 2024-09-24 ENCOUNTER — APPOINTMENT (OUTPATIENT)
Dept: OCCUPATIONAL THERAPY | Facility: CLINIC | Age: 74
End: 2024-09-24
Payer: COMMERCIAL

## 2024-09-26 ENCOUNTER — OFFICE VISIT (OUTPATIENT)
Dept: OCCUPATIONAL THERAPY | Facility: CLINIC | Age: 74
End: 2024-09-26
Payer: COMMERCIAL

## 2024-09-26 DIAGNOSIS — M79.10 MYALGIA: Primary | ICD-10-CM

## 2024-09-26 PROCEDURE — 97110 THERAPEUTIC EXERCISES: CPT

## 2024-09-26 NOTE — PROGRESS NOTES
Daily Note     Today's date: 2024  Patient name: Alden Brooke  : 1950  MRN: [id number]  Referring provider: Ana Allison MD  Dx:   Encounter Diagnosis     ICD-10-CM    1. Myalgia  M79.10                        Subjective: Patient notes consistency with HEP. Patient on vacation for last 3 weeks.       Objective: See treatment diary below         Auth Tracker  Auth Status Total   Visits  Expiration date Co-Insurance   pending                                  Visit Tracker  Date                                                                                    PMHx:   has a past medical history of Abnormal electrocardiogram, Arthritis, Chest pain, Coronary artery disease, Enthesopathy of elbow (02/15/2006), Epididymo-orchitis (07/10/2012), Hyperlipidemia, Hypertension, Obesity, and Voice disturbance (2008).    Precautions:        Manuals HEP 2024                        Ther Ex     Education on HEP and dx  x5min   AROM tendon glides x x10   AROM table top extension x x10   AROM digit add/abduction x x10   AROM wrist flex/ext/RD/UD x x10   PROM elbow ext   5 x 5 sec    Prayer stretch x x3 10 sec   FDC stretch  5x5 sec   Theraputty    X10 yellow   Digit ext band  X10 red   Theraband rows, triceps, shoulder ext, biceps  X10 red              Ther Act                     Modalities     MHP  5 min           Assessment:   Patient tolerated session well. Session focused on ROM and gentle strengthening to improve deficits and functional performance with daily activities. Patient tolerated all TE/TA with no complaints. Patient progressing well towards goals. Patient benefiting from skilled hand therapy OT to reduce deficits to improve independence with daily activities.        Plan:   Focus on ROM and strengthening to improve ability to complete daily activites with ease.  POC 24 - 10/21/24

## 2024-09-27 ENCOUNTER — TELEPHONE (OUTPATIENT)
Age: 74
End: 2024-09-27

## 2024-09-27 NOTE — TELEPHONE ENCOUNTER
Pt called in stating he was told before he renews his Bempedoic Acid (Nexletol) 180 MG TABS [[id number]] he would need blood work done. He would like a call back once the order is in his chart so he can pick it up. He says his Cardiologist (Dr. Kathy Caal) needs the results as well.

## 2024-10-01 DIAGNOSIS — R73.03 PREDIABETES: ICD-10-CM

## 2024-10-01 DIAGNOSIS — I10 ESSENTIAL HYPERTENSION: Primary | ICD-10-CM

## 2024-10-01 DIAGNOSIS — I25.10 CAD IN NATIVE ARTERY: ICD-10-CM

## 2024-10-02 ENCOUNTER — OFFICE VISIT (OUTPATIENT)
Dept: OCCUPATIONAL THERAPY | Facility: CLINIC | Age: 74
End: 2024-10-02
Payer: COMMERCIAL

## 2024-10-02 DIAGNOSIS — M79.10 MYALGIA: Primary | ICD-10-CM

## 2024-10-02 PROCEDURE — 97110 THERAPEUTIC EXERCISES: CPT

## 2024-10-07 LAB
ALBUMIN SERPL-MCNC: 4.4 G/DL (ref 3.8–4.8)
ALP SERPL-CCNC: 42 IU/L (ref 44–121)
ALT SERPL-CCNC: 26 IU/L (ref 0–44)
AST SERPL-CCNC: 28 IU/L (ref 0–40)
BILIRUB SERPL-MCNC: 0.3 MG/DL (ref 0–1.2)
BUN SERPL-MCNC: 16 MG/DL (ref 8–27)
BUN/CREAT SERPL: 18 (ref 10–24)
CALCIUM SERPL-MCNC: 9.7 MG/DL (ref 8.6–10.2)
CHLORIDE SERPL-SCNC: 102 MMOL/L (ref 96–106)
CHOLEST SERPL-MCNC: 205 MG/DL (ref 100–199)
CHOLEST/HDLC SERPL: 6.8 RATIO (ref 0–5)
CO2 SERPL-SCNC: 23 MMOL/L (ref 20–29)
CREAT SERPL-MCNC: 0.89 MG/DL (ref 0.76–1.27)
EGFR: 92 ML/MIN/1.73
EST. AVERAGE GLUCOSE BLD GHB EST-MCNC: 123 MG/DL
GLOBULIN SER-MCNC: 2.9 G/DL (ref 1.5–4.5)
GLUCOSE SERPL-MCNC: 95 MG/DL (ref 70–99)
HBA1C MFR BLD: 5.9 % (ref 4.8–5.6)
HDLC SERPL-MCNC: 30 MG/DL
LDLC SERPL CALC-MCNC: 136 MG/DL (ref 0–99)
MICRODELETION SYND BLD/T FISH: NORMAL
POTASSIUM SERPL-SCNC: 5.3 MMOL/L (ref 3.5–5.2)
PROT SERPL-MCNC: 7.3 G/DL (ref 6–8.5)
SL AMB VLDL CHOLESTEROL CALC: 39 MG/DL (ref 5–40)
SODIUM SERPL-SCNC: 140 MMOL/L (ref 134–144)
TRIGL SERPL-MCNC: 216 MG/DL (ref 0–149)

## 2024-10-08 ENCOUNTER — TELEPHONE (OUTPATIENT)
Age: 74
End: 2024-10-08

## 2024-10-08 NOTE — TELEPHONE ENCOUNTER
Patient received a notification email that his blood work results are ready.  He would like to  a copy ASAP.    Please let patient know when they are reviewed by provider so he can  copy.

## 2024-10-09 ENCOUNTER — TELEPHONE (OUTPATIENT)
Age: 74
End: 2024-10-09

## 2024-10-09 NOTE — TELEPHONE ENCOUNTER
Pt calls today concerned because he got his Lipid panel results back and they were much higher then his previous numbers.  Pt had been taken off a statin due to body aches and put on Nexletol.  Pt is wondering if he should go back on the Rosuvastatin.  He said he is still going to physical therapy to help with his arthritis and aches.

## 2024-10-10 DIAGNOSIS — I25.10 CAD IN NATIVE ARTERY: Primary | ICD-10-CM

## 2024-10-10 RX ORDER — PRAVASTATIN SODIUM 20 MG
20 TABLET ORAL DAILY
Qty: 60 TABLET | Refills: 0 | Status: SHIPPED | OUTPATIENT
Start: 2024-10-10

## 2024-10-11 ENCOUNTER — OFFICE VISIT (OUTPATIENT)
Dept: OCCUPATIONAL THERAPY | Facility: CLINIC | Age: 74
End: 2024-10-11
Payer: COMMERCIAL

## 2024-10-11 DIAGNOSIS — M79.10 MYALGIA: Primary | ICD-10-CM

## 2024-10-11 PROCEDURE — 97110 THERAPEUTIC EXERCISES: CPT

## 2024-10-11 NOTE — PROGRESS NOTES
Daily Note     Today's date: 10/11/2024  Patient name: Alden Brooke  : 1950  MRN: [id number]  Referring provider: Ana Allison MD  Dx:   Encounter Diagnosis     ICD-10-CM    1. Myalgia  M79.10                          Subjective: Patient notes consistency with HEP.       Objective: See treatment diary below         Auth Tracker  Auth Status Total   Visits  Expiration date Co-Insurance   pending                                  Visit Tracker  Date 8/12 8/14 8/19 8/21 9/23 9/26    10/2 10/11                                                                              PMHx:   has a past medical history of Abnormal electrocardiogram, Arthritis, Chest pain, Coronary artery disease, Enthesopathy of elbow (02/15/2006), Epididymo-orchitis (07/10/2012), Hyperlipidemia, Hypertension, Obesity, and Voice disturbance (2008).    Precautions:        Manuals HEP 10/11/2024                        Ther Ex     Education on HEP and dx  x5min   AROM tendon glides x x10   AROM table top extension x x10   AROM digit add/abduction x x10   AROM wrist flex/ext/RD/UD x x10   PROM elbow ext   5 x 5 sec    Prayer stretch x x3 10 sec   FDC stretch  5x5 sec   Theraputty    X10 yellow   Digit ext band  X10 red   Theraband rows, triceps, shoulder ext, biceps  X10 blue             Ther Act                     Modalities     MHP  5 min           Assessment:   Patient tolerated session well. Session focused on ROM and gentle strengthening to improve deficits and functional performance with daily activities. Patient tolerated all TE/TA with no complaints. Patient progressing well towards goals. Patient benefiting from skilled hand therapy OT to reduce deficits to improve independence with daily activities.        Plan:   Focus on ROM and strengthening to improve ability to complete daily activites with ease.  POC 24 - 10/21/24

## 2024-10-15 ENCOUNTER — APPOINTMENT (OUTPATIENT)
Dept: OCCUPATIONAL THERAPY | Facility: CLINIC | Age: 74
End: 2024-10-15
Payer: COMMERCIAL

## 2024-10-17 ENCOUNTER — OFFICE VISIT (OUTPATIENT)
Dept: OCCUPATIONAL THERAPY | Facility: CLINIC | Age: 74
End: 2024-10-17
Payer: COMMERCIAL

## 2024-10-17 DIAGNOSIS — M79.10 MYALGIA: Primary | ICD-10-CM

## 2024-10-17 PROCEDURE — 97110 THERAPEUTIC EXERCISES: CPT

## 2024-10-17 NOTE — PROGRESS NOTES
Daily Note     Today's date: 10/17/2024  Patient name: Alden Brooke  : 1950  MRN: [id number]  Referring provider: Ana Allison MD  Dx:   Encounter Diagnosis     ICD-10-CM    1. Myalgia  M79.10                          Subjective: Patient notes consistency with HEP.       Objective: See treatment diary below         Auth Tracker  Auth Status Total   Visits  Expiration date Co-Insurance   pending                                  Visit Tracker  Date 8/12 8/14 8/19 8/21 9/23 9/26    10/2 10/11                                                                              PMHx:   has a past medical history of Abnormal electrocardiogram, Arthritis, Chest pain, Coronary artery disease, Enthesopathy of elbow (02/15/2006), Epididymo-orchitis (07/10/2012), Hyperlipidemia, Hypertension, Obesity, and Voice disturbance (2008).    Precautions:        Manuals HEP 10/17/2024                        Ther Ex     Education on HEP and dx  x5min   AROM tendon glides x x10   AROM table top extension x x10   AROM digit add/abduction x x10   AROM wrist flex/ext/RD/UD x x10   PROM elbow ext   5 x 5 sec    Prayer stretch x x3 10 sec   FDC stretch  5x5 sec   Theraputty    X10 yellow   Digit ext band  X10 red   Theraband rows, triceps, shoulder ext, biceps  X10 blue             Ther Act                     Modalities     MHP  5 min           Assessment:   Patient tolerated session well. Session focused on ROM and gentle strengthening to improve deficits and functional performance with daily activities. Patient tolerated all TE/TA with no complaints. Patient progressing well towards goals. Patient benefiting from skilled hand therapy OT to reduce deficits to improve independence with daily activities.        Plan:   Focus on ROM and strengthening to improve ability to complete daily activites with ease.  POC 24 - 10/21/24

## 2024-11-18 ENCOUNTER — OFFICE VISIT (OUTPATIENT)
Age: 74
End: 2024-11-18
Payer: COMMERCIAL

## 2024-11-18 VITALS — BODY MASS INDEX: 34.01 KG/M2 | HEIGHT: 66 IN | TEMPERATURE: 97.5 F | WEIGHT: 211.6 LBS

## 2024-11-18 DIAGNOSIS — L57.0 KERATOSIS, ACTINIC: Primary | ICD-10-CM

## 2024-11-18 PROCEDURE — 17000 DESTRUCT PREMALG LESION: CPT | Performed by: DERMATOLOGY

## 2024-11-18 PROCEDURE — 99214 OFFICE O/P EST MOD 30 MIN: CPT | Performed by: DERMATOLOGY

## 2024-11-18 PROCEDURE — 17003 DESTRUCT PREMALG LES 2-14: CPT | Performed by: DERMATOLOGY

## 2024-11-18 RX ORDER — FLUOROURACIL 50 MG/G
CREAM TOPICAL 2 TIMES DAILY
Qty: 40 G | Refills: 1 | Status: SHIPPED | OUTPATIENT
Start: 2024-11-18

## 2024-11-18 NOTE — PROGRESS NOTES
"[facility] Note     Patient Name: Alden Brooke  Encounter Date: 11/18/24     Have you been cared for by a [facility] Dermatologist in the last 3 years and, if so, which description applies to you?    Yes.  I have been here within the last 3 years, and my medical history has NOT changed since that time.  I am MALE/not capable of bearing children.    REVIEW OF SYSTEMS:  Have you recently had or currently have any of the following? No changes in my recent health.   PAST MEDICAL HISTORY:  Have you personally ever had or currently have any of the following?  If \"YES,\" then please provide more detail. No changes in my medical history.   HISTORY OF IMMUNOSUPPRESSION: Do you have a history of any of the following:  Systemic Immunosuppression such as Diabetes, Biologic or Immunotherapy, Chemotherapy, Organ Transplantation, Bone Marrow Transplantation or Prednisone?  No     Answering \"YES\" requires the addition of the dotphrase \"IMMUNOSUPPRESSED\" as the first diagnosis of the patient's visit.   FAMILY HISTORY:  Any \"first degree relatives\" (parent, brother, sister, or child) with the following?    No changes in my family's known health.   PATIENT EXPERIENCE:    Do you want the Dermatologist to perform a COMPLETE skin exam today including a clinical examination under the \"bra and underwear\" areas?  NO  If necessary, do we have your permission to call and leave a detailed message on your Preferred Phone number that includes your specific medical information?  Yes      No Known Allergies   Current Outpatient Medications:     acetaminophen (TYLENOL) 650 mg CR tablet, Take 650 mg by mouth every 8 (eight) hours as needed for mild pain, Disp: , Rfl:     aspirin 81 MG tablet, Take 1 tablet by mouth daily Last dose is 10/14/22, Disp: , Rfl:     Bempedoic Acid (Nexletol) 180 MG TABS, Take 1 tablet by mouth in the morning, Disp: 90 tablet, Rfl: 3    Biotin 99137 MCG TABS, Take 5,000 mg by mouth daily, Disp: , " Rfl:     calcium citrate-vitamin D (CITRACAL+D) 315-200 MG-UNIT per tablet, Take 1 tablet by mouth 2 (two) times a day, Disp: , Rfl:     Cholecalciferol (VITAMIN D3) 2000 units capsule, Take 2,000 Units by mouth daily  , Disp: , Rfl:     Coenzyme Q10 (CO Q-10) 400 MG CAPS, Take 1 capsule by mouth Daily, Disp: , Rfl:     CRANBERRY JUICE EXTRACT PO, Take by mouth, Disp: , Rfl:     cycloSPORINE (RESTASIS) 0.05 % ophthalmic emulsion, Administer to both eyes every 12 (twelve) hours  , Disp: , Rfl:     ezetimibe (ZETIA) 10 mg tablet, TAKE 1 TABLET BY MOUTH IN THE  MORNING, Disp: 90 tablet, Rfl: 3    Icosapent Ethyl 1 g CAPS, TAKE 2 CAPSULES BY MOUTH TWICE  DAILY, Disp: 360 capsule, Rfl: 3    lisinopril (ZESTRIL) 20 mg tablet, TAKE 1 TABLET BY MOUTH DAILY, Disp: 90 tablet, Rfl: 3    magnesium 30 MG tablet, Take 500 mg by mouth 2 (two) times a day, Disp: , Rfl:     metoprolol succinate (TOPROL-XL) 25 mg 24 hr tablet, TAKE 1 TABLET BY MOUTH ONCE  DAILY, Disp: 90 tablet, Rfl: 1    Misc Natural Products (OSTEO BI-FLEX/5-LOXIN ADVANCED) TABS, Take by mouth daily, Disp: , Rfl:     multivitamin-iron-minerals-folic acid (CENTRUM) chewable tablet, Chew 1 tablet daily, Disp: , Rfl:     pravastatin (PRAVACHOL) 20 mg tablet, Take 1 tablet (20 mg total) by mouth daily, Disp: 60 tablet, Rfl: 0    tamsulosin (FLOMAX) 0.4 mg, take 1 capsule by mouth at bedtime, Disp: 90 capsule, Rfl: 1    TURMERIC PO, Take by mouth Last dose 1014/22, Disp: , Rfl:           Whom besides the patient is providing clinical information about today's encounter?   NO ADDITIONAL HISTORIAN (patient alone provided history)    Physical Exam and Assessment/Plan by Diagnosis:    ACTINIC KERATOSIS    Physical Exam:  Anatomic Location Affected:  posterior scalp, frontal scalp, cheeks  Morphological Description:  Scaly pink papules  Pertinent Positives:  Pertinent Negatives:      Assessment and Plan:  Based on a thorough discussion of this condition and the management  approach to it (including a comprehensive discussion of the known risks, side effects and potential benefits of treatment), the patient (family) agrees to implement the following specific plan:  When outside we recommend using a wide brim hat, sunglasses, long sleeve and pants, sunscreen with SPF 30+ with reapplication every 2 hours, or SPF specific clothing   Efudex 5% (fluorouracil) cream: apply topically twice a day for 14 days to scalp. This medication will cause extreme redness, inflammation, open sores. You can apply plain Vaseline 20-30 minutes after applying Efudex to help with the redness, inflammation and blistering. If it is very uncomfortable, please contact Dr. Booth and she may order topical steroids. You can also take a break and try to resume and complete the 14 day course when able to. *If it is more expensive than $50, please contact Dr. Booth/office and we can send the prescription to a mail order compounding pharmacy instead.   Follow up in 6 months.    PROCEDURE:  DESTRUCTION OF PRE-MALIGNANT LESIONS  After a thorough discussion of treatment options and risk/benefits/alternatives (including but not limited to local pain, scarring, dyspigmentation, blistering, and possible superinfection), verbal and written consent were obtained and the aforementioned lesions were treated on with cryotherapy using liquid nitrogen x 1 cycle for 5-10 seconds.    TOTAL NUMBER of 7 pre-malignant lesions were treated today on the ANATOMIC LOCATION: posterior scalp.     The patient tolerated the procedure well, and after-care instructions were provided.     Scribe Attestation      I,:  Ginette Hutton MA am acting as a scribe while in the presence of the attending physician.:       I,:  Mindy Delaney MD personally performed the services described in this documentation    as scribed in my presence.:           Harriet Perez MD   PGY-2 Dermatology Resident

## 2024-11-18 NOTE — PATIENT INSTRUCTIONS
ACTINIC KERATOSIS    Assessment and Plan:  Based on a thorough discussion of this condition and the management approach to it (including a comprehensive discussion of the known risks, side effects and potential benefits of treatment), the patient (family) agrees to implement the following specific plan:  When outside we recommend using a wide brim hat, sunglasses, long sleeve and pants, sunscreen with SPF 30+ with reapplication every 2 hours, or SPF specific clothing   Efudex 5% (fluorouracil) cream: apply topically twice a day for 14-21 days to scalp. This medication will cause extreme redness, inflammation, open sores. You can apply plain Vaseline 20-30 minutes after applying Efudex to help with the redness, inflammation and blistering. If it is very uncomfortable, please contact Dr. Booth and she may order topical steroids. You can also take a break and try to resume and complete the 14 day course when able to. *If it is more expensive than $50, please contact Dr. Booth/office and we can send the prescription to a mail order compounding pharmacy instead.   Follow up in 6 months.

## 2024-11-27 DIAGNOSIS — I25.10 CAD IN NATIVE ARTERY: ICD-10-CM

## 2024-11-27 LAB
CHOLEST SERPL-MCNC: 200 MG/DL (ref 100–199)
CHOLEST/HDLC SERPL: 5.6 RATIO (ref 0–5)
HDLC SERPL-MCNC: 36 MG/DL
LDLC SERPL CALC-MCNC: 135 MG/DL (ref 0–99)
SL AMB VLDL CHOLESTEROL CALC: 29 MG/DL (ref 5–40)
TRIGL SERPL-MCNC: 162 MG/DL (ref 0–149)

## 2024-11-27 NOTE — TELEPHONE ENCOUNTER
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 I gave him a copy. I also put in a request to refill his med.  He asked to to ask you if he still needs to take it. If so [please send to home delivery service

## 2024-11-28 ENCOUNTER — RESULTS FOLLOW-UP (OUTPATIENT)
Dept: CARDIOLOGY CLINIC | Facility: CLINIC | Age: 74
End: 2024-11-28

## 2024-11-29 RX ORDER — PRAVASTATIN SODIUM 20 MG
20 TABLET ORAL DAILY
Qty: 90 TABLET | Refills: 0 | Status: SHIPPED | OUTPATIENT
Start: 2024-11-29 | End: 2024-12-02 | Stop reason: SDUPTHER

## 2024-11-29 NOTE — TELEPHONE ENCOUNTER
Pt is agreeable to increase medication, he asked that we send it to mail order on file. Thank you.

## 2024-11-29 NOTE — TELEPHONE ENCOUNTER
----- Message from Kathy Caal DO sent at 11/28/2024  8:07 AM EST -----  Can you please let the patient know his cholesterol levels are still very high.  If he is tolerating pravastatin, would like him to double it up.    His numbers are double what they were last year

## 2024-11-29 NOTE — TELEPHONE ENCOUNTER
Received call from patient to clarify dosage of Pravastatin. Pt has been taking Pravastatin 20mg since October.  As per Dr. Caal's note from today, patient should double dosage. Prescription called into [facility] pharmacy was for Pravastatin 20mg.   Pt wanting to know if he should take 40mg daily.   Patient would like medication called into his [facility] pharmacy on file. Pt has 7 tablets of 20mg left.

## 2024-12-02 ENCOUNTER — TELEPHONE (OUTPATIENT)
Dept: CARDIOLOGY CLINIC | Facility: CLINIC | Age: 74
End: 2024-12-02

## 2024-12-02 DIAGNOSIS — I25.10 CAD IN NATIVE ARTERY: ICD-10-CM

## 2024-12-02 NOTE — TELEPHONE ENCOUNTER
Patient stopped in today to verify dosage again for Pravastatin also wants rx called to [facility] but does not have enough order sent to [facility] still saying 1 a day supposed to double please call patient and clarify

## 2024-12-03 RX ORDER — PRAVASTATIN SODIUM 40 MG
40 TABLET ORAL DAILY
Qty: 90 TABLET | Refills: 1 | Status: SHIPPED | OUTPATIENT
Start: 2024-12-03

## 2024-12-08 DIAGNOSIS — N40.0 BPH WITHOUT URINARY OBSTRUCTION: ICD-10-CM

## 2024-12-09 RX ORDER — TAMSULOSIN HYDROCHLORIDE 0.4 MG/1
0.4 CAPSULE ORAL
Qty: 90 CAPSULE | Refills: 1 | Status: SHIPPED | OUTPATIENT
Start: 2024-12-09

## 2024-12-18 ENCOUNTER — TELEPHONE (OUTPATIENT)
Age: 74
End: 2024-12-18

## 2024-12-18 NOTE — TELEPHONE ENCOUNTER
Patient called and was seen 11/18/24 and was supposed to do efudex for 14 days to the scalp, he is having cataract surgery that will be all completed by March 15th he was wondering if he should wait until after his cataract surgery is done and then do the efudex treatment call back number [phone]

## 2025-01-13 ENCOUNTER — PRE-OP CATARACT MEASUREMENTS (OUTPATIENT)
Dept: URBAN - METROPOLITAN AREA CLINIC 6 | Facility: CLINIC | Age: 75
End: 2025-01-13

## 2025-01-13 DIAGNOSIS — H25.813: ICD-10-CM

## 2025-01-13 DIAGNOSIS — H40.053: ICD-10-CM

## 2025-01-13 PROCEDURE — 92136 OPHTHALMIC BIOMETRY: CPT

## 2025-01-13 PROCEDURE — 92012 INTRM OPH EXAM EST PATIENT: CPT

## 2025-01-13 ASSESSMENT — KERATOMETRY
OD_K1POWER_DIOPTERS: 40.75
OD_AXISANGLE2_DEGREES: 125
OD_K2POWER_DIOPTERS: 41.50
OS_AXISANGLE_DEGREES: 127
OD_AXISANGLE_DEGREES: 035
OS_K2POWER_DIOPTERS: 42.00
OS_AXISANGLE2_DEGREES: 37
OS_K1POWER_DIOPTERS: 41.25

## 2025-01-13 ASSESSMENT — VISUAL ACUITY
OS_PH: 20/40
OS_CC: 20/100
OD_CC: 20/60
OD_PH: 20/30

## 2025-01-13 ASSESSMENT — TONOMETRY
OS_IOP_MMHG: 21
OD_IOP_MMHG: 23

## 2025-01-20 ENCOUNTER — RA CDI HCC (OUTPATIENT)
Dept: OTHER | Facility: HOSPITAL | Age: 75
End: 2025-01-20

## 2025-01-21 ENCOUNTER — OFFICE VISIT (OUTPATIENT)
Dept: CARDIOLOGY CLINIC | Facility: CLINIC | Age: 75
End: 2025-01-21
Payer: COMMERCIAL

## 2025-01-21 VITALS
WEIGHT: 211 LBS | OXYGEN SATURATION: 98 % | BODY MASS INDEX: 33.91 KG/M2 | HEART RATE: 64 BPM | HEIGHT: 66 IN | SYSTOLIC BLOOD PRESSURE: 118 MMHG | DIASTOLIC BLOOD PRESSURE: 80 MMHG

## 2025-01-21 DIAGNOSIS — E78.5 DYSLIPIDEMIA: ICD-10-CM

## 2025-01-21 DIAGNOSIS — I25.10 CORONARY ARTERY DISEASE INVOLVING NATIVE CORONARY ARTERY OF NATIVE HEART WITHOUT ANGINA PECTORIS: Primary | ICD-10-CM

## 2025-01-21 DIAGNOSIS — Z01.810 PREOPERATIVE CARDIOVASCULAR EXAMINATION: ICD-10-CM

## 2025-01-21 DIAGNOSIS — I10 ESSENTIAL HYPERTENSION: ICD-10-CM

## 2025-01-21 PROCEDURE — 93000 ELECTROCARDIOGRAM COMPLETE: CPT | Performed by: INTERNAL MEDICINE

## 2025-01-21 PROCEDURE — 99214 OFFICE O/P EST MOD 30 MIN: CPT | Performed by: INTERNAL MEDICINE

## 2025-01-21 NOTE — PROGRESS NOTES
Cardiology Follow Up    Alden Brooke  1950  [id number]      Interval History: Alden Brooke is here for follow up of CAD and for preoperative risk evaluation prior to undergoing cataract surgery.    Since his last , he has been feeling well without any chest pain or shortness of breath.  He had myalgias with rosuvastatin and atorvastatin and was switched to pravastatin last visit. Lipid levels were elevated and pravastatin was increased to 40 mg daily along with Zetia 10 mg daily.  Denies any chest pain, shortness of breath, lower extremity edema, orthopnea or paroxysmal nocturnal dyspnea.     Blood work done on November 26, 2024 showed total cholesterol 200 with triglycerides of 162 and LDL of 135.  Pravastatin was increased to 40 mg daily afterwards.  He had blood work done on March 26, 2024 which showed an LDL of 65 mg/dL and total cholesterol 127.  Liver function testing and renal testing was normal.  A1c was 6.2%.  He was on rosuvastatin at that time.     In September 2023, stress test was ordered and he was found to have a rate related left bundle branch block.  He subsequently underwent nuclear stress test which was normal with a reduced EF.  Echocardiogram done afterwards revealed normal EF.           He has a history of RCA stent in 2014.  Prior to his PCI he had a burning sensation in his chest. He previously was on atorvastatin 80 mg daily which was reduced to 40 mg daily due to myalgias and elevated creatinine kinase.    The following portions of the patient's history were reviewed and updated as appropriate:   He  has a past medical history of Abnormal electrocardiogram, Arthritis, Chest pain, Coronary artery disease, Enthesopathy of elbow (02/15/2006), Epididymo-orchitis (07/10/2012), Hyperlipidemia, Hypertension, Obesity, and Voice disturbance (05/27/2008).  He  has a past surgical history that includes Coronary angioplasty with stent;  Appendectomy; Coronary angioplasty; Coronary angioplasty with stent (2013); pr neuroplasty &/transpos median nrv carpal tunne (Left, 8/13/2018); Angioplasty (2013); Carpal tunnel release (Left, 2018); pr neuroplasty &/transpos median nrv carpal tunne (Right, 9/19/2019); and pr tendon sheath incision (Left, 10/20/2022).  His family history includes Arthritis in his brother; Heart disease in his father and mother; Hypertension in his brother and mother; No Known Problems in his maternal aunt, maternal grandfather, maternal grandmother, maternal uncle, paternal aunt, paternal grandfather, paternal grandmother, and paternal uncle; Obesity in his brother.  He  reports that he has never smoked. He has never used smokeless tobacco. He reports that he does not drink alcohol and does not use drugs.  Current Outpatient Medications   Medication Sig Dispense Refill    acetaminophen (TYLENOL) 650 mg CR tablet Take 650 mg by mouth every 8 (eight) hours as needed for mild pain      aspirin 81 MG tablet Take 1 tablet by mouth daily Last dose is 10/14/22      Bempedoic Acid (Nexletol) 180 MG TABS Take 1 tablet by mouth in the morning 90 tablet 3    Biotin 70608 MCG TABS Take 5,000 mg by mouth daily      calcium citrate-vitamin D (CITRACAL+D) 315-200 MG-UNIT per tablet Take 1 tablet by mouth 2 (two) times a day      Cholecalciferol (VITAMIN D3) 2000 units capsule Take 2,000 Units by mouth daily        Coenzyme Q10 (CO Q-10) 400 MG CAPS Take 1 capsule by mouth Daily      CRANBERRY JUICE EXTRACT PO Take by mouth      cycloSPORINE (RESTASIS) 0.05 % ophthalmic emulsion Administer to both eyes every 12 (twelve) hours        ezetimibe (ZETIA) 10 mg tablet TAKE 1 TABLET BY MOUTH IN THE  MORNING 90 tablet 3    fluorouracil (EFUDEX) 5 % cream Apply topically 2 (two) times a day To the scalp and spots on the cheeks for 2 weeks. 40 g 1    Icosapent Ethyl 1 g CAPS TAKE 2 CAPSULES BY MOUTH TWICE  DAILY 360 capsule 3    lisinopril (ZESTRIL) 20 mg  "tablet TAKE 1 TABLET BY MOUTH DAILY 90 tablet 3    magnesium 30 MG tablet Take 500 mg by mouth 2 (two) times a day      metoprolol succinate (TOPROL-XL) 25 mg 24 hr tablet TAKE 1 TABLET BY MOUTH ONCE  DAILY 90 tablet 1    Misc Natural Products (OSTEO BI-FLEX/5-LOXIN ADVANCED) TABS Take by mouth daily      multivitamin-iron-minerals-folic acid (CENTRUM) chewable tablet Chew 1 tablet daily      pravastatin (PRAVACHOL) 40 mg tablet Take 1 tablet (40 mg total) by mouth daily 90 tablet 1    tamsulosin (FLOMAX) 0.4 mg take 1 capsule by mouth at bedtime 90 capsule 1    TURMERIC PO Take by mouth Last dose 1014/22       No current facility-administered medications for this visit.     He has no known allergies..      Review of Systems:  Review of Systems   Eyes:  Positive for visual disturbance.   Respiratory:  Negative for shortness of breath.    Cardiovascular:  Negative for chest pain, palpitations and leg swelling.   Musculoskeletal:  Positive for arthralgias and myalgias.   All other systems reviewed and are negative.      Physical Exam:  /80 (BP Location: Right arm, Patient Position: Sitting, Cuff Size: Large)   Pulse 64   Ht 5' 6\" (1.676 m)   Wt 95.7 kg (211 lb)   SpO2 98%   BMI 34.06 kg/m²     Physical Exam  Constitutional:       General: He is not in acute distress.     Appearance: Normal appearance. He is well-developed. He is not diaphoretic.   HENT:      Head: Normocephalic and atraumatic.   Eyes:      General: No scleral icterus.     Conjunctiva/sclera: Conjunctivae normal.      Pupils: Pupils are equal, round, and reactive to light.   Neck:      Thyroid: No thyromegaly.      Vascular: No JVD.   Cardiovascular:      Rate and Rhythm: Normal rate and regular rhythm.      Heart sounds: Normal heart sounds. No murmur heard.     No friction rub. No gallop.   Pulmonary:      Effort: Pulmonary effort is normal.      Breath sounds: Normal breath sounds.   Musculoskeletal:      Cervical back: Neck supple. "   Skin:     General: Skin is warm and dry.      Findings: No erythema or rash.   Neurological:      General: No focal deficit present.      Mental Status: He is alert and oriented to person, place, and time. Mental status is at baseline.   Psychiatric:         Mood and Affect: Mood normal.         Behavior: Behavior normal.         Thought Content: Thought content normal.         Judgment: Judgment normal.         Labs:  Lab Results   Component Value Date     05/12/2017    K 5.3 (H) 10/07/2024     10/07/2024    CO2 23 10/07/2024    BUN 16 10/07/2024    CREATININE 0.89 10/07/2024    CREATININE 0.88 09/27/2022    CREATININE 0.82 05/12/2017    GLUCOSE 94 05/12/2017    CALCIUM 9.4 09/27/2022    CALCIUM 8.8 05/12/2017     Lab Results   Component Value Date    WBC 7.96 09/27/2022    HGB 14.2 09/27/2022    HCT 43.5 09/27/2022     (H) 09/27/2022     09/27/2022     Lab Results   Component Value Date    CHOL 112 05/12/2017    TRIG 162 (H) 11/26/2024    HDL 36 (L) 11/26/2024     Imaging: No results found.    Discussion/Summary:  1. Coronary artery disease involving native coronary artery of native heart without angina pectoris  -   Continue Toprol XL 25 mg daily.  -   Continue aspirin 81 mg daily for secondary prevention.  -   Discussed diet and exercise.          2. Essential hypertension  -   Blood pressure target is less than 130/80.   Continue current medication regimen.    3. Dyslipidemia  -   LDL target is less than 70 mg/dL.  Triglycerides less than 150.   -Atorvastatin and rosuvastatin stopped because of myalgias.   Switched to pravastatin which he is tolerating but lipid levels increased.  Will repeat lipid profile with pravasatatin dose increase.  - Continue Zetia and Vascepa    4. Preoperative risk stratification prior to undergoing cataract surgery  -Surgery is low risk and he is currently stable from cardiac standpoint.  May proceed as planned.

## 2025-01-27 ENCOUNTER — CONSULT (OUTPATIENT)
Dept: FAMILY MEDICINE CLINIC | Facility: CLINIC | Age: 75
End: 2025-01-27
Payer: COMMERCIAL

## 2025-01-27 VITALS
HEART RATE: 77 BPM | OXYGEN SATURATION: 99 % | TEMPERATURE: 97.5 F | RESPIRATION RATE: 12 BRPM | DIASTOLIC BLOOD PRESSURE: 82 MMHG | HEIGHT: 66 IN | BODY MASS INDEX: 34.07 KG/M2 | WEIGHT: 212 LBS | SYSTOLIC BLOOD PRESSURE: 150 MMHG

## 2025-01-27 DIAGNOSIS — H26.9 CATARACT OF BOTH EYES, UNSPECIFIED CATARACT TYPE: ICD-10-CM

## 2025-01-27 DIAGNOSIS — E66.811 CLASS 1 OBESITY DUE TO EXCESS CALORIES WITH SERIOUS COMORBIDITY AND BODY MASS INDEX (BMI) OF 34.0 TO 34.9 IN ADULT: ICD-10-CM

## 2025-01-27 DIAGNOSIS — I10 ESSENTIAL HYPERTENSION: Primary | ICD-10-CM

## 2025-01-27 DIAGNOSIS — E66.09 CLASS 1 OBESITY DUE TO EXCESS CALORIES WITH SERIOUS COMORBIDITY AND BODY MASS INDEX (BMI) OF 34.0 TO 34.9 IN ADULT: ICD-10-CM

## 2025-01-27 DIAGNOSIS — I25.10 CAD IN NATIVE ARTERY: ICD-10-CM

## 2025-01-27 PROBLEM — E66.812 CLASS 2 OBESITY WITHOUT SERIOUS COMORBIDITY WITH BODY MASS INDEX (BMI) OF 35.0 TO 35.9 IN ADULT: Status: RESOLVED | Noted: 2021-02-22 | Resolved: 2025-01-27

## 2025-01-27 PROCEDURE — 99214 OFFICE O/P EST MOD 30 MIN: CPT | Performed by: FAMILY MEDICINE

## 2025-01-27 PROCEDURE — G2211 COMPLEX E/M VISIT ADD ON: HCPCS | Performed by: FAMILY MEDICINE

## 2025-01-27 NOTE — PROGRESS NOTES
Name: Alden Brooke      : 1950      MRN: [id number]  Encounter Provider: Ana Allison MD  Encounter Date: 2025   Encounter department: [facility]    Assessment & Plan  Essential hypertension  Monitor BP at home  Low Na diet  Cont meds       CAD in native artery  Stable  F/u with cardiologist        Class 1 obesity due to excess calories with serious comorbidity and body mass index (BMI) of 34.0 to 34.9 in adult         Cataract of both eyes, unspecified cataract type  Cleared for surgery             History of Present Illness     Pr with h/o HTN, CAD, HLD, Obesity seen for preop exam prior to b/l cataract surgery  -  admits eating salty food over the weekend       Review of Systems   Constitutional: Negative.    HENT: Negative.     Eyes:  Positive for visual disturbance.   Respiratory: Negative.     Cardiovascular: Negative.    Gastrointestinal: Negative.    Genitourinary: Negative.    Musculoskeletal: Negative.    Skin: Negative.    Neurological: Negative.    Psychiatric/Behavioral: Negative.       Past Medical History:   Diagnosis Date   • Abnormal electrocardiogram     last assessed 13   • Arthritis     hand and forearm   • Chest pain    • Coronary artery disease    • Enthesopathy of elbow 02/15/2006   • Epididymo-orchitis 07/10/2012   • Hyperlipidemia    • Hypertension    • Obesity    • Voice disturbance 2008     Past Surgical History:   Procedure Laterality Date   • ANGIOPLASTY      one stent RCA   • APPENDECTOMY     • CARPAL TUNNEL RELEASE Left    • CORONARY ANGIOPLASTY     • CORONARY ANGIOPLASTY WITH STENT PLACEMENT     • CORONARY ANGIOPLASTY WITH STENT PLACEMENT     • [place] NEUROPLASTY &/TRANSPOS MEDIAN NRV CARPAL TUNNE Left 2018    Procedure: RELEASE CARPAL TUNNEL;  Surgeon: Gustavo Trejo MD;  Location: [facility] OR;  Service: Orthopedics   • [place] NEUROPLASTY &/TRANSPOS MEDIAN NRV CARPAL TUNNE Right 2019    Procedure: RELEASE  CARPAL TUNNEL;  Surgeon: Gustavo Trejo MD;  Location: [facility] OR;  Service: Orthopedics   • CT TENDON SHEATH INCISION Left 10/20/2022    Procedure: RELEASE TRIGGER FINGER LONG FINGER;  Surgeon: Gustavo Treoj MD;  Location: [facility] OR;  Service: Orthopedics     Family History   Problem Relation Age of Onset   • Heart disease Mother    • Hypertension Mother    • Heart disease Father         CHF   • Arthritis Brother    • Obesity Brother    • Hypertension Brother    • No Known Problems Maternal Aunt    • No Known Problems Maternal Uncle    • No Known Problems Paternal Aunt    • No Known Problems Paternal Uncle    • No Known Problems Maternal Grandmother    • No Known Problems Maternal Grandfather    • No Known Problems Paternal Grandmother    • No Known Problems Paternal Grandfather      Social History     Tobacco Use   • Smoking status: Never   • Smokeless tobacco: Never   Vaping Use   • Vaping status: Never Used   Substance and Sexual Activity   • Alcohol use: No   • Drug use: No   • Sexual activity: Not on file     Current Outpatient Medications on File Prior to Visit   Medication Sig   • acetaminophen (TYLENOL) 650 mg CR tablet Take 650 mg by mouth every 8 (eight) hours as needed for mild pain   • aspirin 81 MG tablet Take 1 tablet by mouth daily Last dose is 10/14/22   • Bempedoic Acid (Nexletol) 180 MG TABS Take 1 tablet by mouth in the morning   • Biotin 28233 MCG TABS Take 5,000 mg by mouth daily   • calcium citrate-vitamin D (CITRACAL+D) 315-200 MG-UNIT per tablet Take 1 tablet by mouth 2 (two) times a day   • Cholecalciferol (VITAMIN D3) 2000 units capsule Take 2,000 Units by mouth daily     • Coenzyme Q10 (CO Q-10) 400 MG CAPS Take 1 capsule by mouth Daily   • CRANBERRY JUICE EXTRACT PO Take by mouth   • cycloSPORINE (RESTASIS) 0.05 % ophthalmic emulsion Administer to both eyes every 12 (twelve) hours     • ezetimibe (ZETIA) 10 mg tablet TAKE 1 TABLET BY MOUTH IN THE  MORNING   •  "fluorouracil (EFUDEX) 5 % cream Apply topically 2 (two) times a day To the scalp and spots on the cheeks for 2 weeks.   • Icosapent Ethyl 1 g CAPS TAKE 2 CAPSULES BY MOUTH TWICE  DAILY   • lisinopril (ZESTRIL) 20 mg tablet TAKE 1 TABLET BY MOUTH DAILY   • magnesium 30 MG tablet Take 500 mg by mouth 2 (two) times a day   • metoprolol succinate (TOPROL-XL) 25 mg 24 hr tablet TAKE 1 TABLET BY MOUTH ONCE  DAILY   • Misc Natural Products (OSTEO BI-FLEX/5-LOXIN ADVANCED) TABS Take by mouth daily   • multivitamin-iron-minerals-folic acid (CENTRUM) chewable tablet Chew 1 tablet daily   • pravastatin (PRAVACHOL) 40 mg tablet Take 1 tablet (40 mg total) by mouth daily   • tamsulosin (FLOMAX) 0.4 mg take 1 capsule by mouth at bedtime   • TURMERIC PO Take by mouth Last dose 1014/22     No Known Allergies  Immunization History   Administered Date(s) Administered   • COVID-19 MODERNA VACC 0.25 ML IM BOOSTER 01/28/2022   • COVID-19 MODERNA VACC 0.5 ML IM 02/11/2021, 03/11/2021   • Influenza Quadrivalent Preservative Free 3 years and older IM 11/09/2015, 10/19/2016   • Influenza, high dose seasonal 0.7 mL 11/04/2019, 12/14/2020, 12/01/2021, 01/27/2023   • Influenza, injectable, quadrivalent, preservative free 0.5 mL 11/05/2018   • Influenza, seasonal, injectable 11/21/2014, 11/03/2017   • Pneumococcal Conjugate 13-Valent 06/05/2019   • Pneumococcal Polysaccharide PPV23 05/04/2022   • Tdap 08/10/2007, 11/20/2017     Objective   /82 (BP Location: Left arm, Patient Position: Sitting, Cuff Size: Large)   Pulse 77   Temp 97.5 °F (36.4 °C) (Temporal)   Resp 12   Ht 5' 6\" (1.676 m)   Wt 96.2 kg (212 lb)   SpO2 99%   BMI 34.22 kg/m²     Physical Exam  Constitutional:       General: He is not in acute distress.     Appearance: He is obese. He is not ill-appearing.   HENT:      Nose: No congestion or rhinorrhea.      Mouth/Throat:      Pharynx: No oropharyngeal exudate or posterior oropharyngeal erythema.   Cardiovascular:      " Rate and Rhythm: Normal rate and regular rhythm.      Heart sounds: No murmur heard.  Pulmonary:      Effort: Pulmonary effort is normal. No respiratory distress.      Breath sounds: No wheezing, rhonchi or rales.   Abdominal:      Palpations: Abdomen is soft.      Tenderness: There is no abdominal tenderness.   Musculoskeletal:      Right lower leg: No edema.      Left lower leg: No edema.   Neurological:      Mental Status: He is alert and oriented to person, place, and time.      Cranial Nerves: No cranial nerve deficit.      Motor: No weakness.      Gait: Gait normal.

## 2025-02-10 NOTE — PROGRESS NOTES
CHW tried calling the pt to introduce the PCM program. Pt did not answer and this CHW left a VM requesting a return call. 2/10  Community Health Worker Follow-Up    Reason for outreach: CHW tried calling the pt to introduce the PCM program.    CHW Interventions: CHW and the pt discussed the program and the benefits of the program. Pt stated st this time she still was not interested and would like a call again ing 6 mo to a year.    Specific Resources Provided:  Housing/Shelter: n/a  Transportation: N/A  Food: N/A  Financial: N/A  Social Supports: N/A  Other: N/A    Plan: Pt delined the program at this time. This CHW will call again in 6 mo to a year.This CHW gave the pt the PCM number and encouraged her to call if needed.     Daily Note     Today's date: 10/2/2024  Patient name: Alden Brooke  : 1950  MRN: [id number]  Referring provider: Ana Alilson MD  Dx:   Encounter Diagnosis     ICD-10-CM    1. Myalgia  M79.10                          Subjective: Patient notes consistency with HEP.       Objective: See treatment diary below         Auth Tracker  Auth Status Total   Visits  Expiration date Co-Insurance   pending                                  Visit Tracker  Date 8/12 8/14 8/19 8/21 9/23 9/26    10/2                                                                               PMHx:   has a past medical history of Abnormal electrocardiogram, Arthritis, Chest pain, Coronary artery disease, Enthesopathy of elbow (02/15/2006), Epididymo-orchitis (07/10/2012), Hyperlipidemia, Hypertension, Obesity, and Voice disturbance (2008).    Precautions:        Manuals HEP 10/2/2024                        Ther Ex     Education on HEP and dx  x5min   AROM tendon glides x x10   AROM table top extension x x10   AROM digit add/abduction x x10   AROM wrist flex/ext/RD/UD x x10   PROM elbow ext   5 x 5 sec    Prayer stretch x x3 10 sec   FDC stretch  5x5 sec   Theraputty    X10 yellow   Digit ext band  X10 red   Theraband rows, triceps, shoulder ext, biceps  X10 blue             Ther Act                     Modalities     MHP  5 min           Assessment:   Patient tolerated session well. Session focused on ROM and gentle strengthening to improve deficits and functional performance with daily activities. Patient tolerated all TE/TA with no complaints. Patient progressing well towards goals. Patient benefiting from skilled hand therapy OT to reduce deficits to improve independence with daily activities.        Plan:   Focus on ROM and strengthening to improve ability to complete daily activites with ease.  POC 24 - 10/21/24

## 2025-02-12 ENCOUNTER — 1 DAY POST-OP (OUTPATIENT)
Dept: URBAN - METROPOLITAN AREA CLINIC 6 | Facility: CLINIC | Age: 75
End: 2025-02-12

## 2025-02-12 DIAGNOSIS — Z96.1: ICD-10-CM

## 2025-02-12 DIAGNOSIS — H25.811: ICD-10-CM

## 2025-02-12 PROCEDURE — 99024 POSTOP FOLLOW-UP VISIT: CPT

## 2025-02-12 PROCEDURE — 92136 OPHTHALMIC BIOMETRY: CPT | Mod: 26,RT

## 2025-02-12 PROCEDURE — 65800 DRAINAGE OF EYE: CPT

## 2025-02-12 ASSESSMENT — KERATOMETRY
OS_K2POWER_DIOPTERS: 42.00
OD_K2POWER_DIOPTERS: 41.50
OD_K1POWER_DIOPTERS: 40.75
OS_AXISANGLE_DEGREES: 127
OD_AXISANGLE2_DEGREES: 125
OD_AXISANGLE_DEGREES: 035
OS_AXISANGLE2_DEGREES: 37
OS_K1POWER_DIOPTERS: 41.25

## 2025-02-12 ASSESSMENT — TONOMETRY
OS_IOP_MMHG: 16
OD_IOP_MMHG: 27
OS_IOP_MMHG: 40
OS_IOP_MMHG: 47

## 2025-02-12 ASSESSMENT — VISUAL ACUITY
OS_PH: 20/25-1
OS_SC: 20/30+2

## 2025-02-20 ENCOUNTER — 1 WEEK POST-OP (OUTPATIENT)
Dept: URBAN - METROPOLITAN AREA CLINIC 6 | Facility: CLINIC | Age: 75
End: 2025-02-20

## 2025-02-20 DIAGNOSIS — H25.811: ICD-10-CM

## 2025-02-20 DIAGNOSIS — Z96.1: ICD-10-CM

## 2025-02-20 PROCEDURE — 99024 POSTOP FOLLOW-UP VISIT: CPT

## 2025-02-20 ASSESSMENT — TONOMETRY
OD_IOP_MMHG: 25
OS_IOP_MMHG: 10

## 2025-02-20 ASSESSMENT — KERATOMETRY
OS_K1POWER_DIOPTERS: 41.25
OD_K2POWER_DIOPTERS: 41.50
OS_K2POWER_DIOPTERS: 42.00
OD_AXISANGLE2_DEGREES: 125
OS_AXISANGLE_DEGREES: 127
OS_AXISANGLE2_DEGREES: 37
OD_AXISANGLE_DEGREES: 035
OD_K1POWER_DIOPTERS: 40.75

## 2025-02-20 ASSESSMENT — VISUAL ACUITY: OS_SC: 20/25

## 2025-02-21 DIAGNOSIS — E78.5 DYSLIPIDEMIA: ICD-10-CM

## 2025-02-21 DIAGNOSIS — I10 ESSENTIAL HYPERTENSION: ICD-10-CM

## 2025-02-21 DIAGNOSIS — I25.10 CORONARY ARTERY DISEASE INVOLVING NATIVE CORONARY ARTERY OF NATIVE HEART WITHOUT ANGINA PECTORIS: ICD-10-CM

## 2025-02-21 RX ORDER — LISINOPRIL 20 MG/1
20 TABLET ORAL DAILY
Qty: 90 TABLET | Refills: 3 | Status: SHIPPED | OUTPATIENT
Start: 2025-02-21

## 2025-02-21 RX ORDER — ICOSAPENT ETHYL 1 G/1
2 CAPSULE ORAL 2 TIMES DAILY
Qty: 360 CAPSULE | Refills: 3 | Status: SHIPPED | OUTPATIENT
Start: 2025-02-21

## 2025-02-21 RX ORDER — EZETIMIBE 10 MG/1
10 TABLET ORAL EVERY MORNING
Qty: 90 TABLET | Refills: 3 | Status: SHIPPED | OUTPATIENT
Start: 2025-02-21

## 2025-02-21 RX ORDER — METOPROLOL SUCCINATE 25 MG/1
25 TABLET, EXTENDED RELEASE ORAL DAILY
Qty: 90 TABLET | Refills: 1 | Status: SHIPPED | OUTPATIENT
Start: 2025-02-21

## 2025-02-26 ENCOUNTER — 1 DAY POST-OP (OUTPATIENT)
Dept: URBAN - METROPOLITAN AREA CLINIC 6 | Facility: CLINIC | Age: 75
End: 2025-02-26

## 2025-02-26 DIAGNOSIS — H40.053: ICD-10-CM

## 2025-02-26 DIAGNOSIS — Z96.1: ICD-10-CM

## 2025-02-26 PROCEDURE — 99024 POSTOP FOLLOW-UP VISIT: CPT

## 2025-02-26 PROCEDURE — 65800 DRAINAGE OF EYE: CPT | Mod: NC

## 2025-02-26 ASSESSMENT — KERATOMETRY
OS_K2POWER_DIOPTERS: 42.00
OD_AXISANGLE2_DEGREES: 125
OS_K1POWER_DIOPTERS: 41.25
OD_K2POWER_DIOPTERS: 41.50
OS_AXISANGLE_DEGREES: 127
OD_AXISANGLE_DEGREES: 035
OS_AXISANGLE2_DEGREES: 37
OD_K1POWER_DIOPTERS: 40.75

## 2025-02-26 ASSESSMENT — TONOMETRY
OD_IOP_MMHG: 39
OD_IOP_MMHG: 12

## 2025-02-26 ASSESSMENT — VISUAL ACUITY
OD_PH: 20/25+1
OD_SC: 20/30-2
OS_SC: 20/25-2

## 2025-03-06 ENCOUNTER — 1 WEEK POST-OP (OUTPATIENT)
Dept: URBAN - METROPOLITAN AREA CLINIC 6 | Facility: CLINIC | Age: 75
End: 2025-03-06

## 2025-03-06 DIAGNOSIS — Z96.1: ICD-10-CM

## 2025-03-06 PROCEDURE — 99024 POSTOP FOLLOW-UP VISIT: CPT

## 2025-03-06 ASSESSMENT — TONOMETRY
OD_IOP_MMHG: 14
OS_IOP_MMHG: 22

## 2025-03-06 ASSESSMENT — KERATOMETRY
OS_AXISANGLE_DEGREES: 127
OS_AXISANGLE2_DEGREES: 37
OD_AXISANGLE2_DEGREES: 125
OD_AXISANGLE_DEGREES: 035
OD_K1POWER_DIOPTERS: 40.75
OS_K2POWER_DIOPTERS: 42.00
OS_K1POWER_DIOPTERS: 41.25
OD_K2POWER_DIOPTERS: 41.50

## 2025-03-06 ASSESSMENT — VISUAL ACUITY
OS_SC: J2
OD_SC: J7
OD_SC: 20/40
OS_SC: 20/30

## 2025-04-03 ENCOUNTER — 1 MONTH POST-OP (OUTPATIENT)
Dept: URBAN - METROPOLITAN AREA CLINIC 6 | Facility: CLINIC | Age: 75
End: 2025-04-03

## 2025-04-03 DIAGNOSIS — H35.371: ICD-10-CM

## 2025-04-03 DIAGNOSIS — Z96.1: ICD-10-CM

## 2025-04-03 PROCEDURE — 99024 POSTOP FOLLOW-UP VISIT: CPT

## 2025-04-03 PROCEDURE — 92134 CPTRZ OPH DX IMG PST SGM RTA: CPT

## 2025-04-03 ASSESSMENT — VISUAL ACUITY
OD_SC: J7
OS_SC: J10
OS_SC: 20/30
OD_PH: 20/25
OU_SC: 20/25
OD_SC: 20/40

## 2025-04-03 ASSESSMENT — KERATOMETRY
OS_K1POWER_DIOPTERS: 41.25
OD_AXISANGLE_DEGREES: 035
OS_AXISANGLE_DEGREES: 127
OD_AXISANGLE2_DEGREES: 125
OS_K2POWER_DIOPTERS: 42.00
OS_AXISANGLE2_DEGREES: 37
OD_K2POWER_DIOPTERS: 41.50
OD_K1POWER_DIOPTERS: 40.75

## 2025-04-03 ASSESSMENT — TONOMETRY
OS_IOP_MMHG: 18
OD_IOP_MMHG: 21

## 2025-04-04 ENCOUNTER — RA CDI HCC (OUTPATIENT)
Dept: OTHER | Facility: HOSPITAL | Age: 75
End: 2025-04-04

## 2025-05-01 ENCOUNTER — RESULTS FOLLOW-UP (OUTPATIENT)
Dept: CARDIOLOGY CLINIC | Facility: CLINIC | Age: 75
End: 2025-05-01

## 2025-05-01 DIAGNOSIS — I25.10 CORONARY ARTERY DISEASE INVOLVING NATIVE CORONARY ARTERY OF NATIVE HEART WITHOUT ANGINA PECTORIS: Primary | ICD-10-CM

## 2025-05-01 LAB
ALBUMIN SERPL-MCNC: 4.6 G/DL (ref 3.8–4.8)
ALP SERPL-CCNC: 41 IU/L (ref 44–121)
ALT SERPL-CCNC: 29 IU/L (ref 0–44)
AST SERPL-CCNC: 28 IU/L (ref 0–40)
BILIRUB SERPL-MCNC: 0.5 MG/DL (ref 0–1.2)
BUN SERPL-MCNC: 15 MG/DL (ref 8–27)
BUN/CREAT SERPL: 18 (ref 10–24)
CALCIUM SERPL-MCNC: 9.2 MG/DL (ref 8.6–10.2)
CHLORIDE SERPL-SCNC: 103 MMOL/L (ref 96–106)
CHOLEST SERPL-MCNC: 197 MG/DL (ref 100–199)
CHOLEST/HDLC SERPL: 4.9 RATIO (ref 0–5)
CO2 SERPL-SCNC: 21 MMOL/L (ref 20–29)
CREAT SERPL-MCNC: 0.82 MG/DL (ref 0.76–1.27)
EGFR: 93 ML/MIN/1.73
GLOBULIN SER-MCNC: 2.5 G/DL (ref 1.5–4.5)
GLUCOSE SERPL-MCNC: 104 MG/DL (ref 70–99)
HDLC SERPL-MCNC: 40 MG/DL
LDLC SERPL CALC-MCNC: 135 MG/DL (ref 0–99)
POTASSIUM SERPL-SCNC: 4.3 MMOL/L (ref 3.5–5.2)
PROT SERPL-MCNC: 7.1 G/DL (ref 6–8.5)
SL AMB VLDL CHOLESTEROL CALC: 22 MG/DL (ref 5–40)
SODIUM SERPL-SCNC: 140 MMOL/L (ref 134–144)
TRIGL SERPL-MCNC: 122 MG/DL (ref 0–149)

## 2025-05-01 RX ORDER — ATORVASTATIN CALCIUM 40 MG/1
40 TABLET, FILM COATED ORAL DAILY
Qty: 90 TABLET | Refills: 3 | Status: SHIPPED | OUTPATIENT
Start: 2025-05-01

## 2025-05-20 ENCOUNTER — TELEPHONE (OUTPATIENT)
Age: 75
End: 2025-05-20

## 2025-05-20 ENCOUNTER — OFFICE VISIT (OUTPATIENT)
Age: 75
End: 2025-05-20

## 2025-05-20 VITALS — BODY MASS INDEX: 33.09 KG/M2 | WEIGHT: 205 LBS | TEMPERATURE: 97.4 F

## 2025-05-20 DIAGNOSIS — L57.0 KERATOSIS, ACTINIC: Primary | ICD-10-CM

## 2025-05-20 RX ORDER — FLUOROURACIL 50 MG/G
CREAM TOPICAL
Qty: 30 G | Refills: 0 | Status: SHIPPED | OUTPATIENT
Start: 2025-05-20

## 2025-05-20 RX ORDER — TRETINOIN 0.25 MG/G
CREAM TOPICAL
Qty: 45 G | Refills: 2 | Status: SHIPPED | OUTPATIENT
Start: 2025-05-20

## 2025-05-20 NOTE — TELEPHONE ENCOUNTER
Patient called stating he was just in the office today ad he would like to hold off on getting the tretinoin 0.025% cream and the fluorouracil 5% cream

## 2025-05-20 NOTE — PATIENT INSTRUCTIONS
ACTINIC KERATOSIS     Physical Exam:  Anatomic Location Affected:  posterior scalp, frontal scalp, cheeks  Morphological Description:  Scaly pink papules  Pertinent Positives:  Pertinent Negatives:        Assessment and Plan:  Based on a thorough discussion of this condition and the management approach to it (including a comprehensive discussion of the known risks, side effects and potential benefits of treatment), the patient (family) agrees to implement the following specific plan:  When outside we recommend using a wide brim hat, sunglasses, long sleeve and pants, sunscreen with SPF 30+ with reapplication every 2 hours, or SPF specific clothing   Apply Efudex / Calcipotriene cream twice daily for 5 days to forehead, temples and cheeks in cooler months  Apply tretinoin 0.025% cream to face twice daily; apply moisturizer first then apply tretinoin   Follow up in 6 months.

## 2025-05-20 NOTE — PROGRESS NOTES
"[facility] Note     Patient Name: Alden Brooke  Encounter Date: 5/20/25       Have you been cared for by a [facility] Dermatologist in the last 3 years and, if so, which description applies to you? Yes. I have been here within the last 3 years, and my medical history has NOT changed since that time. I am not of child-bearing potential.     REVIEW OF SYSTEMS:  Have you recently had or currently have any of the following? No changes in my recent health.   PAST MEDICAL HISTORY:  Have you personally ever had or currently have any of the following?  If \"YES,\" then please provide more detail. No changes in my medical history.   HISTORY OF IMMUNOSUPPRESSION: Do you have a history of any of the following:  Systemic Immunosuppression such as Diabetes, Biologic or Immunotherapy, Chemotherapy, Organ Transplantation, Bone Marrow Transplantation or Prednisone?  No     Answering \"YES\" requires the addition of the dotphrase \"IMMUNOSUPPRESSED\" as the first diagnosis of the patient's visit.   FAMILY HISTORY:  Any \"first degree relatives\" (parent, brother, sister, or child) with the following?    No changes in my family's known health.   PATIENT EXPERIENCE:    Do you want the Dermatologist to perform a COMPLETE skin exam today including a clinical examination under the \"bra and underwear\" areas?  NO  If necessary, do we have your permission to call and leave a detailed message on your Preferred Phone number that includes your specific medical information?  Yes      Allergies[1] Current Medications[2]              Whom besides the patient is providing clinical information about today's encounter?   NO ADDITIONAL HISTORIAN (patient alone provided history)    Physical Exam and Assessment/Plan by Diagnosis:    ACTINIC KERATOSIS     Physical Exam:  Anatomic Location Affected:  posterior scalp, frontal scalp, cheeks  Morphological Description:  Scaly pink papules  Pertinent Positives:  Pertinent Negatives:      "   Assessment and Plan:patient did not do treatment previously because he was told by ophthalmologist not to use it before cataract surgery.  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  We will have him start tretinoin now and plan to use the topical chemotherapy in the fall when his performances are less frequent  Based on a thorough discussion of this condition and the management approach to it (including a comprehensive discussion of the known risks, side effects and potential benefits of treatment), the patient (family) agrees to implement the following specific plan:  When outside we recommend using a wide brim hat, sunglasses, long sleeve and pants, sunscreen with SPF 30+ with reapplication every 2 hours, or SPF specific clothing   Apply Efudex / Calcipotriene cream twice daily for 5 days to forehead, temples and cheeks in cooler months  Apply tretinoin 0.025% cream to face twice daily; apply moisturizer first then apply tretinoin   Follow up in 6 months.     Scribe Attestation    I,:  Kristy Santamaria am acting as a scribe while in the presence of the attending physician.:       I,:  Mindy Delaney MD personally performed the services described in this documentation    as scribed in my presence.:                    [1]  No Known Allergies[2]    Current Outpatient Medications:   •  acetaminophen (TYLENOL) 650 mg CR tablet, Take 650 mg by mouth every 8 (eight) hours as needed for mild pain, Disp: , Rfl:   •  aspirin 81 MG tablet, Take 1 tablet by mouth in the morning. Last dose is 10/14/22., Disp: , Rfl:   •  atorvastatin (LIPITOR) 40 mg tablet, Take 1 tablet (40 mg total) by mouth daily, Disp: 90 tablet, Rfl: 3  •  Bempedoic Acid (Nexletol) 180 MG TABS, Take 1 tablet by mouth in the morning, Disp: 90 tablet, Rfl: 3  •  Biotin 43505 MCG TABS, Take 5,000 mg by mouth in the morning., Disp: , Rfl:   •  calcium citrate-vitamin D (CITRACAL+D)  315-200 MG-UNIT per tablet, Take 1 tablet by mouth in the morning and 1 tablet in the evening., Disp: , Rfl:   •  Cholecalciferol (VITAMIN D3) 2000 units capsule, Take 2,000 Units by mouth in the morning., Disp: , Rfl:   •  Coenzyme Q10 (CO Q-10) 400 MG CAPS, Take 1 capsule by mouth in the morning., Disp: , Rfl:   •  CRANBERRY JUICE EXTRACT PO, Take by mouth, Disp: , Rfl:   •  cycloSPORINE (RESTASIS) 0.05 % ophthalmic emulsion, Administer to both eyes every 12 (twelve) hours, Disp: , Rfl:   •  ezetimibe (ZETIA) 10 mg tablet, Take 1 tablet (10 mg total) by mouth every morning, Disp: 90 tablet, Rfl: 3  •  fluorouracil (EFUDEX) 5 % cream, Use twice a day  forehead, temples and sides of face for 5 days, Disp: 30 g, Rfl: 0  •  lisinopril (ZESTRIL) 20 mg tablet, Take 1 tablet (20 mg total) by mouth daily, Disp: 90 tablet, Rfl: 3  •  metoprolol succinate (TOPROL-XL) 25 mg 24 hr tablet, Take 1 tablet (25 mg total) by mouth daily, Disp: 90 tablet, Rfl: 1  •  Misc Natural Products (OSTEO BI-FLEX/5-LOXIN ADVANCED) TABS, Take by mouth in the morning., Disp: , Rfl:   •  multivitamin-iron-minerals-folic acid (CENTRUM) chewable tablet, Chew 1 tablet in the morning., Disp: , Rfl:   •  tamsulosin (FLOMAX) 0.4 mg, take 1 capsule by mouth at bedtime, Disp: 90 capsule, Rfl: 1  •  tretinoin (RETIN-A) 0.025 % cream, to face nightly; apply moisturizer first then apply tretinoin; can decrease to alternate days if too irritating, Disp: 45 g, Rfl: 2  •  TURMERIC PO, Take by mouth Last dose 1014/22, Disp: , Rfl:   •  Icosapent Ethyl 1 g CAPS, Take 2 capsules (2 g total) by mouth 2 (two) times a day (Patient not taking: Reported on 5/20/2025), Disp: 360 capsule, Rfl: 3  •  magnesium 30 MG tablet, Take 500 mg by mouth 2 (two) times a day (Patient not taking: Reported on 5/20/2025), Disp: , Rfl:

## 2025-05-20 NOTE — TELEPHONE ENCOUNTER
PA for Tretinoin 0.025% cream SUBMITTED to [facility]     via    []CMM-KEY:   [x]Surescripts-Case ID # [id number]   []Availity-Auth ID # NDC #   []Faxed to plan   []Other website   []Phone call Case ID #     [x]PA sent as URGENT    All office notes, labs and other pertaining documents and studies sent. Clinical questions answered. Awaiting determination from insurance company.     Turnaround time for your insurance to make a decision on your Prior Authorization can take 7-21 business days.

## 2025-05-20 NOTE — TELEPHONE ENCOUNTER
Ok his choice. Does he want me to cancel the prescriptions?  Or does he just want to not pick them up?

## 2025-05-22 NOTE — TELEPHONE ENCOUNTER
PA for Tretinoin 0.025% cream  APPROVED     Date(s) approved until 05/20/2026    Case #[id number]     Patient advised by          []Wantrhart Message  []Phone call   [x]LMOM  []L/M to call office as no active Communication consent on file  []Unable to leave detailed message as VM not approved on Communication consent       Pharmacy advised by    [x]Fax  []Phone call  []Secure Chat    Specialty Pharmacy    []     Approval letter scanned into Media Yes

## 2025-05-23 DIAGNOSIS — N40.0 BPH WITHOUT URINARY OBSTRUCTION: ICD-10-CM

## 2025-05-25 RX ORDER — TAMSULOSIN HYDROCHLORIDE 0.4 MG/1
0.4 CAPSULE ORAL
Qty: 90 CAPSULE | Refills: 1 | Status: SHIPPED | OUTPATIENT
Start: 2025-05-25

## 2025-07-02 ENCOUNTER — RESULTS FOLLOW-UP (OUTPATIENT)
Dept: CARDIOLOGY CLINIC | Facility: CLINIC | Age: 75
End: 2025-07-02

## 2025-07-02 LAB
CHOLEST SERPL-MCNC: 117 MG/DL (ref 100–199)
CHOLEST/HDLC SERPL: 3.3 RATIO (ref 0–5)
HDLC SERPL-MCNC: 36 MG/DL
LDLC SERPL CALC-MCNC: 64 MG/DL (ref 0–99)
SL AMB VLDL CHOLESTEROL CALC: 17 MG/DL (ref 5–40)
TRIGL SERPL-MCNC: 86 MG/DL (ref 0–149)

## 2025-07-02 NOTE — TELEPHONE ENCOUNTER
----- Message from LULU Mock sent at 7/2/2025 12:57 PM EDT -----  Please let patient know that his lipids look fabulous. Total cholesterol is 117, triglycerides are 86 and low density or bad cholesterol is 64 this is significant improvement from two months ago.   Continue current diet, exercise and medications.  ----- Message -----  From: [facility] Results In  Sent: 7/2/2025   7:35 AM EDT  To: Kathy Caal DO

## 2025-07-02 NOTE — TELEPHONE ENCOUNTER
I called patient and reached voicemail.  Left message for patient informing him of excellent blood work results - significantly improved from 2 months ago.  Advised patient to call back if he has any questions.

## 2025-07-08 ENCOUNTER — OFFICE VISIT (OUTPATIENT)
Dept: CARDIOLOGY CLINIC | Facility: CLINIC | Age: 75
End: 2025-07-08
Payer: COMMERCIAL

## 2025-07-08 VITALS
DIASTOLIC BLOOD PRESSURE: 80 MMHG | HEART RATE: 66 BPM | HEIGHT: 66 IN | OXYGEN SATURATION: 96 % | BODY MASS INDEX: 33.91 KG/M2 | WEIGHT: 211 LBS | SYSTOLIC BLOOD PRESSURE: 126 MMHG

## 2025-07-08 DIAGNOSIS — E78.5 DYSLIPIDEMIA: ICD-10-CM

## 2025-07-08 DIAGNOSIS — I25.10 CORONARY ARTERY DISEASE INVOLVING NATIVE CORONARY ARTERY OF NATIVE HEART WITHOUT ANGINA PECTORIS: Primary | ICD-10-CM

## 2025-07-08 DIAGNOSIS — I10 ESSENTIAL HYPERTENSION: ICD-10-CM

## 2025-07-08 DIAGNOSIS — R73.09 ABNORMAL BLOOD SUGAR: ICD-10-CM

## 2025-07-08 PROCEDURE — 93000 ELECTROCARDIOGRAM COMPLETE: CPT | Performed by: INTERNAL MEDICINE

## 2025-07-08 PROCEDURE — 99214 OFFICE O/P EST MOD 30 MIN: CPT | Performed by: INTERNAL MEDICINE

## 2025-07-08 RX ORDER — ICOSAPENT ETHYL 1 G/1
1 CAPSULE ORAL 2 TIMES DAILY
Qty: 60 CAPSULE | Refills: 3 | Status: SHIPPED | OUTPATIENT
Start: 2025-07-08

## 2025-07-08 NOTE — PROGRESS NOTES
Cardiology Follow Up    Alden Brooke  1950  [id number]      Interval History: Alden Brooke is here for follow up of CAD.    Since his last , he has been feeling well without any chest pain or shortness of breath.    He had blood work done for follow-up of hyperlipidemia.  He was restarted on atorvastatin 40 mg daily because Nexletol did not improve lipid levels.  In the past, he developed myalgias with rosuvastatin and atorvastatin along with pravastatin.  He remains on Zetia 10 mg daily.  There was significant improvement in lipid levels with LDL of 64 mg/dL which was significantly improved from 135 mg/dL.  He denies any myalgias.  He also denies any chest pain, shortness of breath, lower extreme edema, orthopnea or paroxysmal nocturnal dyspnea.     In September 2023, stress test was ordered and he was found to have a rate related left bundle branch block.  He subsequently underwent nuclear stress test which was normal with a reduced EF.  Echocardiogram done afterwards revealed normal EF.  He has LBBB consistently now.     He has a history of RCA stent in 2014.  Prior to his PCI he had a burning sensation in his chest.      The following portions of the patient's history were reviewed and updated as appropriate:   He  has a past medical history of Abnormal electrocardiogram, Actinic keratosis, Arthritis, Chest pain, Coronary artery disease, Enthesopathy of elbow (02/15/2006), Epididymo-orchitis (07/10/2012), Hyperlipidemia, Hypertension, Obesity, and Voice disturbance (05/27/2008).  He  has a past surgical history that includes Coronary angioplasty with stent; Appendectomy; Coronary angioplasty; Coronary angioplasty with stent (2013); pr neuroplasty &/transpos median nrv carpal tunne (Left, 8/13/2018); Angioplasty (2013); Carpal tunnel release (Left, 2018); pr neuroplasty &/transpos median nrv carpal tunne (Right, 9/19/2019); and pr tendon sheath  incision (Left, 10/20/2022).  His family history includes Arthritis in his brother; Heart disease in his father and mother; Hypertension in his brother and mother; No Known Problems in his maternal aunt, maternal grandfather, maternal grandmother, maternal uncle, paternal aunt, paternal grandfather, paternal grandmother, and paternal uncle; Obesity in his brother.  He  reports that he has never smoked. He has never used smokeless tobacco. He reports that he does not drink alcohol and does not use drugs.  Current Outpatient Medications   Medication Sig Dispense Refill    acetaminophen (TYLENOL) 650 mg CR tablet Take 650 mg by mouth every 8 (eight) hours as needed for mild pain      aspirin 81 MG tablet Take 1 tablet by mouth in the morning. Last dose is 10/14/22.      atorvastatin (LIPITOR) 40 mg tablet Take 1 tablet (40 mg total) by mouth daily 90 tablet 3    Bempedoic Acid (Nexletol) 180 MG TABS Take 1 tablet by mouth in the morning 90 tablet 3    Biotin 76607 MCG TABS Take 5,000 mg by mouth in the morning.      calcium citrate-vitamin D (CITRACAL+D) 315-200 MG-UNIT per tablet Take 1 tablet by mouth in the morning and 1 tablet in the evening.      Cholecalciferol (VITAMIN D3) 2000 units capsule Take 2,000 Units by mouth in the morning.      Coenzyme Q10 (CO Q-10) 400 MG CAPS Take 1 capsule by mouth in the morning.      CRANBERRY JUICE EXTRACT PO Take by mouth      cycloSPORINE (RESTASIS) 0.05 % ophthalmic emulsion Administer to both eyes every 12 (twelve) hours      ezetimibe (ZETIA) 10 mg tablet Take 1 tablet (10 mg total) by mouth every morning 90 tablet 3    fluorouracil (EFUDEX) 5 % cream Use twice a day  forehead, temples and sides of face for 5 days 30 g 0    lisinopril (ZESTRIL) 20 mg tablet Take 1 tablet (20 mg total) by mouth daily 90 tablet 3    metoprolol succinate (TOPROL-XL) 25 mg 24 hr tablet Take 1 tablet (25 mg total) by mouth daily 90 tablet 1    Misc Natural Products (OSTEO BI-FLEX/5-LOXIN  "ADVANCED) TABS Take by mouth in the morning.      multivitamin-iron-minerals-folic acid (CENTRUM) chewable tablet Chew 1 tablet in the morning.      tamsulosin (FLOMAX) 0.4 mg Take 1 capsule (0.4 mg total) by mouth daily at bedtime 90 capsule 1    tretinoin (RETIN-A) 0.025 % cream to face nightly; apply moisturizer first then apply tretinoin; can decrease to alternate days if too irritating 45 g 2    TURMERIC PO Take by mouth Last dose 1014/22      Icosapent Ethyl 1 g CAPS Take 2 capsules (2 g total) by mouth 2 (two) times a day (Patient not taking: Reported on 7/8/2025) 360 capsule 3    magnesium 30 MG tablet Take 500 mg by mouth 2 (two) times a day (Patient not taking: Reported on 5/20/2025)       No current facility-administered medications for this visit.     He has no known allergies..      Review of Systems:  Review of Systems   Respiratory:  Negative for shortness of breath.    Cardiovascular:  Negative for chest pain, palpitations and leg swelling.   Musculoskeletal:  Positive for arthralgias.   All other systems reviewed and are negative.      Physical Exam:  /80 (BP Location: Left arm, Patient Position: Sitting, Cuff Size: Large)   Pulse 66   Ht 5' 6\" (1.676 m)   Wt 95.7 kg (211 lb)   SpO2 96%   BMI 34.06 kg/m²     Physical Exam  Constitutional:       General: He is not in acute distress.     Appearance: Normal appearance. He is well-developed. He is not diaphoretic.   HENT:      Head: Normocephalic and atraumatic.     Eyes:      General: No scleral icterus.     Conjunctiva/sclera: Conjunctivae normal.      Pupils: Pupils are equal, round, and reactive to light.     Neck:      Thyroid: No thyromegaly.      Vascular: No JVD.     Cardiovascular:      Rate and Rhythm: Normal rate and regular rhythm.      Heart sounds: Normal heart sounds. No murmur heard.     No friction rub. No gallop.   Pulmonary:      Effort: Pulmonary effort is normal.      Breath sounds: Normal breath sounds. "     Musculoskeletal:      Cervical back: Neck supple.     Skin:     General: Skin is warm and dry.      Findings: No erythema or rash.     Neurological:      General: No focal deficit present.      Mental Status: He is alert and oriented to person, place, and time. Mental status is at baseline.     Psychiatric:         Mood and Affect: Mood normal.         Behavior: Behavior normal.         Thought Content: Thought content normal.         Judgment: Judgment normal.         Labs:  Lab Results   Component Value Date     05/12/2017    K 4.3 04/30/2025     04/30/2025    CO2 21 04/30/2025    BUN 15 04/30/2025    CREATININE 0.82 04/30/2025    CREATININE 0.88 09/27/2022    CREATININE 0.82 05/12/2017    GLUCOSE 94 05/12/2017    CALCIUM 9.4 09/27/2022    CALCIUM 8.8 05/12/2017     Lab Results   Component Value Date    WBC 7.96 09/27/2022    HGB 14.2 09/27/2022    HCT 43.5 09/27/2022     (H) 09/27/2022     09/27/2022     Lab Results   Component Value Date    CHOL 112 05/12/2017    TRIG 86 07/01/2025    HDL 36 (L) 07/01/2025     Imaging: No results found.    Discussion/Summary:  1. Coronary artery disease involving native coronary artery of native heart without angina pectoris  -   Continue Toprol XL 25 mg daily.  -   Continue aspirin 81 mg daily for secondary prevention.  -   Discussed diet and exercise.     -   Recommend aggressive medical therapy and secondary risk modification. Patient was instructed in length of symptoms of stable and unstable angina as well as myocardial infarction and patient was advised to report such symptoms promptly or seek urgent medical help if they were to develop.   -   Stop calcium supplement     2. Essential hypertension  -   Blood pressure target is less than 130/80.   Continue current medication regimen.    3. Dyslipidemia  -   LDL target is less than 70 mg/dL.  Triglycerides less than 150.   -   He is tolerating atorvastatin 40 mg daily.  - Continue Zetia and  Vascepa -will reduce Vascepa to 1 tablet twice a day.  Repeat lipid panel later this year.  Encouraged eating fish twice a week.

## 2025-07-12 DIAGNOSIS — I25.10 CORONARY ARTERY DISEASE INVOLVING NATIVE CORONARY ARTERY OF NATIVE HEART WITHOUT ANGINA PECTORIS: ICD-10-CM

## 2025-07-14 RX ORDER — METOPROLOL SUCCINATE 25 MG/1
25 TABLET, EXTENDED RELEASE ORAL DAILY
Qty: 90 TABLET | Refills: 1 | Status: SHIPPED | OUTPATIENT
Start: 2025-07-14

## (undated) DEVICE — NEEDLE 18 G X 1 1/2 SAFETY

## (undated) DEVICE — BASIC DOUBLE BASIN 2-LF: Brand: MEDLINE INDUSTRIES, INC.

## (undated) DEVICE — STOCKINETTE REGULAR

## (undated) DEVICE — NEEDLE 25GA X 1 IN SAFETY GLIDE

## (undated) DEVICE — DRAPE SHEET THREE QUARTER

## (undated) DEVICE — CHLORAPREP HI-LITE 26ML ORANGE

## (undated) DEVICE — LABEL MEDICATION STERILE 2 YELLOW LIDOCAINE 2 BLUE MARCAINE 2 ORANGE HEPARIN

## (undated) DEVICE — BIPOLAR CORD DISP

## (undated) DEVICE — SUT PROLENE 4-0 PS-2 18 IN 8682G

## (undated) DEVICE — STRETCH BANDAGE: Brand: CURITY

## (undated) DEVICE — PACK GENERAL LF

## (undated) DEVICE — INTENDED FOR TISSUE SEPARATION, AND OTHER PROCEDURES THAT REQUIRE A SHARP SURGICAL BLADE TO PUNCTURE OR CUT.: Brand: BARD-PARKER SAFETY BLADES SIZE 15, STERILE

## (undated) DEVICE — GLOVE SRG BIOGEL 7.5

## (undated) DEVICE — SUT ETHILON 4-0 PS-2 18 IN 1667G

## (undated) DEVICE — EXTREMITY DRAPE W/ARMBOARD COVERS: Brand: CONVERTORS

## (undated) DEVICE — GLOVE INDICATOR PI UNDERGLOVE SZ 7.5 BLUE

## (undated) DEVICE — FABRIC REINFORCED, SURGICAL GOWN, XL: Brand: CONVERTORS

## (undated) DEVICE — SYRINGE 10ML LL CONTROL TOP

## (undated) DEVICE — ASTOUND STANDARD SURGICAL GOWN, XL: Brand: CONVERTORS

## (undated) DEVICE — BANDAGE, ESMARK LF STR 6"X9' (20/CS): Brand: CYPRESS

## (undated) DEVICE — OCCLUSIVE GAUZE STRIP,3% BISMUTH TRIBROMOPHENATE IN PETROLATUM BLEND: Brand: XEROFORM

## (undated) DEVICE — SPONGE GAUZE 4 X 8 12 PLY STRL LF

## (undated) DEVICE — CUFF TOURNIQUET 18 X 4 IN QUICK CONNECT DISP 1 BLADDER

## (undated) DEVICE — ADHESIVE SKIN HIGH VISCOSITY EXOFIN 1ML

## (undated) DEVICE — ACE WRAP 2 IN UNSTERILE

## (undated) DEVICE — TIBURON HAND DRAPE: Brand: CONVERTORS

## (undated) DEVICE — NON-STERILE REUSABLE TOURNIQUET CUFF SINGLE BLADDER, DUAL PORT AND QUICK CONNECT CONNECTOR: Brand: COLOR CUFF

## (undated) DEVICE — GLOVE INDICATOR PI UNDERGLOVE SZ 6.5 BLUE

## (undated) DEVICE — GLOVE SRG BIOGEL 6.5

## (undated) DEVICE — SPONGE GAUZE 4 X 9

## (undated) RX ORDER — BRINZOLAMIDE/BRIMONIDINE TARTRATE 10; 2 MG/ML; MG/ML: 1 SUSPENSION/ DROPS OPHTHALMIC TWICE A DAY